# Patient Record
Sex: MALE | Race: WHITE | NOT HISPANIC OR LATINO | Employment: OTHER | URBAN - NONMETROPOLITAN AREA
[De-identification: names, ages, dates, MRNs, and addresses within clinical notes are randomized per-mention and may not be internally consistent; named-entity substitution may affect disease eponyms.]

---

## 2021-01-18 ENCOUNTER — HOSPITAL ENCOUNTER (INPATIENT)
Facility: HOSPITAL | Age: 66
LOS: 2 days | Discharge: HOME | End: 2021-01-20
Attending: EMERGENCY MEDICINE | Admitting: INTERNAL MEDICINE

## 2021-01-18 ENCOUNTER — APPOINTMENT (OUTPATIENT)
Dept: GENERAL RADIOLOGY | Facility: HOSPITAL | Age: 66
End: 2021-01-18

## 2021-01-18 DIAGNOSIS — I50.9 ACUTE CONGESTIVE HEART FAILURE, UNSPECIFIED HEART FAILURE TYPE (HCC): Primary | ICD-10-CM

## 2021-01-18 DIAGNOSIS — I48.91 NEW ONSET ATRIAL FIBRILLATION (HCC): ICD-10-CM

## 2021-01-18 DIAGNOSIS — R07.9 CHEST PAIN IN ADULT: ICD-10-CM

## 2021-01-18 DIAGNOSIS — I10 ESSENTIAL HYPERTENSION: ICD-10-CM

## 2021-01-18 PROBLEM — R73.9 HYPERGLYCEMIA: Status: ACTIVE | Noted: 2021-01-18

## 2021-01-18 PROBLEM — U07.1 COVID-19 VIRUS DETECTED: Status: ACTIVE | Noted: 2021-01-18

## 2021-01-18 PROBLEM — E66.9 OBESITY (BMI 30-39.9): Chronic | Status: ACTIVE | Noted: 2021-01-18

## 2021-01-18 PROBLEM — Z87.891 FORMER SMOKER: Chronic | Status: ACTIVE | Noted: 2021-01-18

## 2021-01-18 PROBLEM — I51.7 CARDIOMEGALY: Status: ACTIVE | Noted: 2021-01-18

## 2021-01-18 LAB
ALBUMIN SERPL-MCNC: 4.19 G/DL (ref 3.5–5.2)
ALBUMIN/GLOB SERPL: 1.6 G/DL
ALP SERPL-CCNC: 87 U/L (ref 39–117)
ALT SERPL W P-5'-P-CCNC: 24 U/L (ref 1–41)
ANION GAP SERPL CALCULATED.3IONS-SCNC: 11.3 MMOL/L (ref 5–15)
APTT PPP: 32.8 SECONDS (ref 25.6–35.3)
AST SERPL-CCNC: 20 U/L (ref 1–40)
B PERT DNA SPEC QL NAA+PROBE: NOT DETECTED
BASOPHILS # BLD AUTO: 0.04 10*3/MM3 (ref 0–0.2)
BASOPHILS NFR BLD AUTO: 0.4 % (ref 0–1.5)
BILIRUB SERPL-MCNC: 1.4 MG/DL (ref 0–1.2)
BUN SERPL-MCNC: 17 MG/DL (ref 8–23)
BUN/CREAT SERPL: 17.5 (ref 7–25)
C PNEUM DNA NPH QL NAA+NON-PROBE: NOT DETECTED
CALCIUM SPEC-SCNC: 9.4 MG/DL (ref 8.6–10.5)
CHLORIDE SERPL-SCNC: 102 MMOL/L (ref 98–107)
CO2 SERPL-SCNC: 25.7 MMOL/L (ref 22–29)
CREAT SERPL-MCNC: 0.97 MG/DL (ref 0.76–1.27)
CRP SERPL-MCNC: <0.3 MG/DL (ref 0–0.5)
D DIMER PPP FEU-MCNC: 0.37 MCGFEU/ML (ref 0–0.5)
DEPRECATED RDW RBC AUTO: 43 FL (ref 37–54)
EOSINOPHIL # BLD AUTO: 0.04 10*3/MM3 (ref 0–0.4)
EOSINOPHIL NFR BLD AUTO: 0.4 % (ref 0.3–6.2)
ERYTHROCYTE [DISTWIDTH] IN BLOOD BY AUTOMATED COUNT: 12.7 % (ref 12.3–15.4)
FERRITIN SERPL-MCNC: 277.6 NG/ML (ref 30–400)
FLUAV RNA RESP QL NAA+PROBE: NOT DETECTED
FLUAV SUBTYP SPEC NAA+PROBE: NOT DETECTED
FLUBV RNA ISLT QL NAA+PROBE: NOT DETECTED
FLUBV RNA RESP QL NAA+PROBE: NOT DETECTED
GFR SERPL CREATININE-BSD FRML MDRD: 78 ML/MIN/1.73
GLOBULIN UR ELPH-MCNC: 2.6 GM/DL
GLUCOSE SERPL-MCNC: 110 MG/DL (ref 65–99)
HADV DNA SPEC NAA+PROBE: NOT DETECTED
HBA1C MFR BLD: 6 % (ref 4.8–5.6)
HCOV 229E RNA SPEC QL NAA+PROBE: NOT DETECTED
HCOV HKU1 RNA SPEC QL NAA+PROBE: NOT DETECTED
HCOV NL63 RNA SPEC QL NAA+PROBE: NOT DETECTED
HCOV OC43 RNA SPEC QL NAA+PROBE: NOT DETECTED
HCT VFR BLD AUTO: 46.9 % (ref 37.5–51)
HGB BLD-MCNC: 15.4 G/DL (ref 13–17.7)
HMPV RNA NPH QL NAA+NON-PROBE: NOT DETECTED
HOLD SPECIMEN: NORMAL
HOLD SPECIMEN: NORMAL
HPIV1 RNA SPEC QL NAA+PROBE: NOT DETECTED
HPIV2 RNA SPEC QL NAA+PROBE: NOT DETECTED
HPIV3 RNA NPH QL NAA+PROBE: NOT DETECTED
HPIV4 P GENE NPH QL NAA+PROBE: NOT DETECTED
IMM GRANULOCYTES # BLD AUTO: 0.04 10*3/MM3 (ref 0–0.05)
IMM GRANULOCYTES NFR BLD AUTO: 0.4 % (ref 0–0.5)
L PNEUMO1 AG UR QL IA: NEGATIVE
LDH SERPL-CCNC: 251 U/L (ref 135–225)
LYMPHOCYTES # BLD AUTO: 2.53 10*3/MM3 (ref 0.7–3.1)
LYMPHOCYTES NFR BLD AUTO: 23.6 % (ref 19.6–45.3)
M PNEUMO IGG SER IA-ACNC: NOT DETECTED
MCH RBC QN AUTO: 30.7 PG (ref 26.6–33)
MCHC RBC AUTO-ENTMCNC: 32.8 G/DL (ref 31.5–35.7)
MCV RBC AUTO: 93.4 FL (ref 79–97)
MONOCYTES # BLD AUTO: 0.69 10*3/MM3 (ref 0.1–0.9)
MONOCYTES NFR BLD AUTO: 6.4 % (ref 5–12)
NEUTROPHILS NFR BLD AUTO: 68.8 % (ref 42.7–76)
NEUTROPHILS NFR BLD AUTO: 7.39 10*3/MM3 (ref 1.7–7)
NRBC BLD AUTO-RTO: 0 /100 WBC (ref 0–0.2)
NT-PROBNP SERPL-MCNC: 7152 PG/ML (ref 0–900)
PLATELET # BLD AUTO: 201 10*3/MM3 (ref 140–450)
PMV BLD AUTO: 9.8 FL (ref 6–12)
POTASSIUM SERPL-SCNC: 4.2 MMOL/L (ref 3.5–5.2)
PROT SERPL-MCNC: 6.8 G/DL (ref 6–8.5)
RBC # BLD AUTO: 5.02 10*6/MM3 (ref 4.14–5.8)
RHINOVIRUS RNA SPEC NAA+PROBE: NOT DETECTED
RSV RNA NPH QL NAA+NON-PROBE: NOT DETECTED
SARS-COV-2 RNA RESP QL NAA+PROBE: DETECTED
SODIUM SERPL-SCNC: 139 MMOL/L (ref 136–145)
TROPONIN T SERPL-MCNC: <0.01 NG/ML (ref 0–0.03)
TROPONIN T SERPL-MCNC: <0.01 NG/ML (ref 0–0.03)
WBC # BLD AUTO: 10.73 10*3/MM3 (ref 3.4–10.8)
WHOLE BLOOD HOLD SPECIMEN: NORMAL
WHOLE BLOOD HOLD SPECIMEN: NORMAL

## 2021-01-18 PROCEDURE — 71045 X-RAY EXAM CHEST 1 VIEW: CPT

## 2021-01-18 PROCEDURE — 93005 ELECTROCARDIOGRAM TRACING: CPT | Performed by: EMERGENCY MEDICINE

## 2021-01-18 PROCEDURE — 84484 ASSAY OF TROPONIN QUANT: CPT | Performed by: NURSE PRACTITIONER

## 2021-01-18 PROCEDURE — 87636 SARSCOV2 & INF A&B AMP PRB: CPT | Performed by: EMERGENCY MEDICINE

## 2021-01-18 PROCEDURE — 99223 1ST HOSP IP/OBS HIGH 75: CPT | Performed by: HOSPITALIST

## 2021-01-18 PROCEDURE — 87899 AGENT NOS ASSAY W/OPTIC: CPT | Performed by: PHYSICIAN ASSISTANT

## 2021-01-18 PROCEDURE — 25010000002 CEFTRIAXONE PER 250 MG: Performed by: NURSE PRACTITIONER

## 2021-01-18 PROCEDURE — 80053 COMPREHEN METABOLIC PANEL: CPT | Performed by: NURSE PRACTITIONER

## 2021-01-18 PROCEDURE — 84145 PROCALCITONIN (PCT): CPT | Performed by: PHYSICIAN ASSISTANT

## 2021-01-18 PROCEDURE — 0099U HC BIOFIRE FILMARRAY RESP PANEL 1: CPT | Performed by: PHYSICIAN ASSISTANT

## 2021-01-18 PROCEDURE — 85730 THROMBOPLASTIN TIME PARTIAL: CPT | Performed by: NURSE PRACTITIONER

## 2021-01-18 PROCEDURE — 71045 X-RAY EXAM CHEST 1 VIEW: CPT | Performed by: RADIOLOGY

## 2021-01-18 PROCEDURE — 82728 ASSAY OF FERRITIN: CPT | Performed by: PHYSICIAN ASSISTANT

## 2021-01-18 PROCEDURE — 93010 ELECTROCARDIOGRAM REPORT: CPT | Performed by: INTERNAL MEDICINE

## 2021-01-18 PROCEDURE — 83036 HEMOGLOBIN GLYCOSYLATED A1C: CPT | Performed by: HOSPITALIST

## 2021-01-18 PROCEDURE — 87040 BLOOD CULTURE FOR BACTERIA: CPT | Performed by: PHYSICIAN ASSISTANT

## 2021-01-18 PROCEDURE — 25010000002 ENOXAPARIN PER 10 MG: Performed by: HOSPITALIST

## 2021-01-18 PROCEDURE — 83615 LACTATE (LD) (LDH) ENZYME: CPT | Performed by: PHYSICIAN ASSISTANT

## 2021-01-18 PROCEDURE — 25010000002 FUROSEMIDE PER 20 MG: Performed by: PHYSICIAN ASSISTANT

## 2021-01-18 PROCEDURE — 86140 C-REACTIVE PROTEIN: CPT | Performed by: PHYSICIAN ASSISTANT

## 2021-01-18 PROCEDURE — 85379 FIBRIN DEGRADATION QUANT: CPT | Performed by: PHYSICIAN ASSISTANT

## 2021-01-18 PROCEDURE — 99284 EMERGENCY DEPT VISIT MOD MDM: CPT

## 2021-01-18 PROCEDURE — 83880 ASSAY OF NATRIURETIC PEPTIDE: CPT | Performed by: NURSE PRACTITIONER

## 2021-01-18 PROCEDURE — 36415 COLL VENOUS BLD VENIPUNCTURE: CPT

## 2021-01-18 PROCEDURE — 85025 COMPLETE CBC W/AUTO DIFF WBC: CPT | Performed by: NURSE PRACTITIONER

## 2021-01-18 RX ORDER — SODIUM CHLORIDE 0.9 % (FLUSH) 0.9 %
10 SYRINGE (ML) INJECTION AS NEEDED
Status: DISCONTINUED | OUTPATIENT
Start: 2021-01-18 | End: 2021-01-20 | Stop reason: HOSPADM

## 2021-01-18 RX ORDER — ONDANSETRON 2 MG/ML
4 INJECTION INTRAMUSCULAR; INTRAVENOUS EVERY 6 HOURS PRN
Status: DISCONTINUED | OUTPATIENT
Start: 2021-01-18 | End: 2021-01-20 | Stop reason: HOSPADM

## 2021-01-18 RX ORDER — NITROGLYCERIN 0.4 MG/1
0.4 TABLET SUBLINGUAL
Status: DISCONTINUED | OUTPATIENT
Start: 2021-01-18 | End: 2021-01-20 | Stop reason: HOSPADM

## 2021-01-18 RX ORDER — ACETAMINOPHEN 325 MG/1
650 TABLET ORAL EVERY 6 HOURS PRN
Status: DISCONTINUED | OUTPATIENT
Start: 2021-01-18 | End: 2021-01-20 | Stop reason: HOSPADM

## 2021-01-18 RX ORDER — FUROSEMIDE 10 MG/ML
40 INJECTION INTRAMUSCULAR; INTRAVENOUS ONCE
Status: COMPLETED | OUTPATIENT
Start: 2021-01-18 | End: 2021-01-18

## 2021-01-18 RX ORDER — GUAIFENESIN 600 MG/1
600 TABLET, EXTENDED RELEASE ORAL 2 TIMES DAILY PRN
COMMUNITY
End: 2021-03-01

## 2021-01-18 RX ORDER — SODIUM CHLORIDE 0.9 % (FLUSH) 0.9 %
10 SYRINGE (ML) INJECTION EVERY 12 HOURS SCHEDULED
Status: DISCONTINUED | OUTPATIENT
Start: 2021-01-18 | End: 2021-01-20 | Stop reason: HOSPADM

## 2021-01-18 RX ORDER — PANTOPRAZOLE SODIUM 40 MG/1
40 TABLET, DELAYED RELEASE ORAL
Status: DISCONTINUED | OUTPATIENT
Start: 2021-01-19 | End: 2021-01-20 | Stop reason: HOSPADM

## 2021-01-18 RX ORDER — ASPIRIN 81 MG/1
81 TABLET, CHEWABLE ORAL DAILY
Status: DISCONTINUED | OUTPATIENT
Start: 2021-01-19 | End: 2021-01-20 | Stop reason: HOSPADM

## 2021-01-18 RX ORDER — ASPIRIN 81 MG/1
324 TABLET, CHEWABLE ORAL ONCE
Status: COMPLETED | OUTPATIENT
Start: 2021-01-18 | End: 2021-01-18

## 2021-01-18 RX ORDER — DEXAMETHASONE 4 MG/1
6 TABLET ORAL DAILY
Status: DISCONTINUED | OUTPATIENT
Start: 2021-01-18 | End: 2021-01-19

## 2021-01-18 RX ADMIN — SODIUM CHLORIDE, PRESERVATIVE FREE 10 ML: 5 INJECTION INTRAVENOUS at 21:33

## 2021-01-18 RX ADMIN — CEFTRIAXONE 1 G: 1 INJECTION, POWDER, FOR SOLUTION INTRAMUSCULAR; INTRAVENOUS at 18:25

## 2021-01-18 RX ADMIN — METOPROLOL TARTRATE 25 MG: 25 TABLET, FILM COATED ORAL at 23:00

## 2021-01-18 RX ADMIN — ENOXAPARIN SODIUM 40 MG: 40 INJECTION SUBCUTANEOUS at 20:04

## 2021-01-18 RX ADMIN — FUROSEMIDE 40 MG: 10 INJECTION, SOLUTION INTRAMUSCULAR; INTRAVENOUS at 23:00

## 2021-01-18 RX ADMIN — ASPIRIN 324 MG: 81 TABLET, CHEWABLE ORAL at 14:55

## 2021-01-19 ENCOUNTER — APPOINTMENT (OUTPATIENT)
Dept: CARDIOLOGY | Facility: HOSPITAL | Age: 66
End: 2021-01-19

## 2021-01-19 ENCOUNTER — APPOINTMENT (OUTPATIENT)
Dept: CT IMAGING | Facility: HOSPITAL | Age: 66
End: 2021-01-19

## 2021-01-19 LAB
ALBUMIN SERPL-MCNC: 4.03 G/DL (ref 3.5–5.2)
ALBUMIN/GLOB SERPL: 1.5 G/DL
ALP SERPL-CCNC: 84 U/L (ref 39–117)
ALT SERPL W P-5'-P-CCNC: 21 U/L (ref 1–41)
ANION GAP SERPL CALCULATED.3IONS-SCNC: 11.7 MMOL/L (ref 5–15)
AST SERPL-CCNC: 17 U/L (ref 1–40)
BASOPHILS # BLD AUTO: 0.07 10*3/MM3 (ref 0–0.2)
BASOPHILS NFR BLD AUTO: 0.7 % (ref 0–1.5)
BH CV ECHO MEAS - ACS: 2.8 CM
BH CV ECHO MEAS - AO ROOT AREA (BSA CORRECTED): 1.9
BH CV ECHO MEAS - AO ROOT AREA: 17 CM^2
BH CV ECHO MEAS - AO ROOT DIAM: 4.7 CM
BH CV ECHO MEAS - BSA(HAYCOCK): 2.6 M^2
BH CV ECHO MEAS - BSA: 2.5 M^2
BH CV ECHO MEAS - BZI_BMI: 34.7 KILOGRAMS/M^2
BH CV ECHO MEAS - BZI_METRIC_HEIGHT: 188 CM
BH CV ECHO MEAS - BZI_METRIC_WEIGHT: 122.5 KG
BH CV ECHO MEAS - EDV(MOD-SP4): 124 ML
BH CV ECHO MEAS - EF(MOD-SP4): 53.6 %
BH CV ECHO MEAS - ESV(MOD-SP4): 57.5 ML
BH CV ECHO MEAS - LA DIMENSION: 3.9 CM
BH CV ECHO MEAS - LA/AO: 0.84
BH CV ECHO MEAS - LV DIASTOLIC VOL/BSA (35-75): 50.2 ML/M^2
BH CV ECHO MEAS - LV SYSTOLIC VOL/BSA (12-30): 23.3 ML/M^2
BH CV ECHO MEAS - LVLD AP4: 8.7 CM
BH CV ECHO MEAS - LVLS AP4: 7.8 CM
BH CV ECHO MEAS - RAP SYSTOLE: 10 MMHG
BH CV ECHO MEAS - RVSP: 41.8 MMHG
BH CV ECHO MEAS - SI(MOD-SP4): 26.9 ML/M^2
BH CV ECHO MEAS - SV(MOD-SP4): 66.5 ML
BH CV ECHO MEAS - TR MAX VEL: 282 CM/SEC
BILIRUB SERPL-MCNC: 1.1 MG/DL (ref 0–1.2)
BUN SERPL-MCNC: 18 MG/DL (ref 8–23)
BUN/CREAT SERPL: 18.4 (ref 7–25)
CALCIUM SPEC-SCNC: 9.4 MG/DL (ref 8.6–10.5)
CHLORIDE SERPL-SCNC: 102 MMOL/L (ref 98–107)
CHOLEST SERPL-MCNC: 181 MG/DL (ref 0–200)
CK SERPL-CCNC: 91 U/L (ref 20–200)
CO2 SERPL-SCNC: 26.3 MMOL/L (ref 22–29)
CREAT SERPL-MCNC: 0.98 MG/DL (ref 0.76–1.27)
CRP SERPL-MCNC: <0.3 MG/DL (ref 0–0.5)
D DIMER PPP FEU-MCNC: 0.52 MCGFEU/ML (ref 0–0.5)
DEPRECATED RDW RBC AUTO: 43.8 FL (ref 37–54)
EOSINOPHIL # BLD AUTO: 0.08 10*3/MM3 (ref 0–0.4)
EOSINOPHIL NFR BLD AUTO: 0.8 % (ref 0.3–6.2)
ERYTHROCYTE [DISTWIDTH] IN BLOOD BY AUTOMATED COUNT: 12.7 % (ref 12.3–15.4)
FERRITIN SERPL-MCNC: 284.9 NG/ML (ref 30–400)
FIBRINOGEN PPP-MCNC: 422 MG/DL (ref 173–524)
GFR SERPL CREATININE-BSD FRML MDRD: 77 ML/MIN/1.73
GLOBULIN UR ELPH-MCNC: 2.8 GM/DL
GLUCOSE SERPL-MCNC: 111 MG/DL (ref 65–99)
HCT VFR BLD AUTO: 48.9 % (ref 37.5–51)
HDLC SERPL-MCNC: 56 MG/DL (ref 40–60)
HGB BLD-MCNC: 15.8 G/DL (ref 13–17.7)
IMM GRANULOCYTES # BLD AUTO: 0.03 10*3/MM3 (ref 0–0.05)
IMM GRANULOCYTES NFR BLD AUTO: 0.3 % (ref 0–0.5)
LDH SERPL-CCNC: 234 U/L (ref 135–225)
LDLC SERPL CALC-MCNC: 103 MG/DL (ref 0–100)
LDLC/HDLC SERPL: 1.79 {RATIO}
LYMPHOCYTES # BLD AUTO: 3.08 10*3/MM3 (ref 0.7–3.1)
LYMPHOCYTES NFR BLD AUTO: 32.6 % (ref 19.6–45.3)
MAGNESIUM SERPL-MCNC: 1.9 MG/DL (ref 1.6–2.4)
MAXIMAL PREDICTED HEART RATE: 156 BPM
MCH RBC QN AUTO: 30.6 PG (ref 26.6–33)
MCHC RBC AUTO-ENTMCNC: 32.3 G/DL (ref 31.5–35.7)
MCV RBC AUTO: 94.6 FL (ref 79–97)
MONOCYTES # BLD AUTO: 0.67 10*3/MM3 (ref 0.1–0.9)
MONOCYTES NFR BLD AUTO: 7.1 % (ref 5–12)
NEUTROPHILS NFR BLD AUTO: 5.51 10*3/MM3 (ref 1.7–7)
NEUTROPHILS NFR BLD AUTO: 58.5 % (ref 42.7–76)
NRBC BLD AUTO-RTO: 0 /100 WBC (ref 0–0.2)
NT-PROBNP SERPL-MCNC: 6866 PG/ML (ref 0–900)
PHOSPHATE SERPL-MCNC: 2.6 MG/DL (ref 2.5–4.5)
PLATELET # BLD AUTO: 211 10*3/MM3 (ref 140–450)
PMV BLD AUTO: 10.3 FL (ref 6–12)
POTASSIUM SERPL-SCNC: 3.7 MMOL/L (ref 3.5–5.2)
PROCALCITONIN SERPL-MCNC: 0.05 NG/ML (ref 0–0.25)
PROT SERPL-MCNC: 6.8 G/DL (ref 6–8.5)
RBC # BLD AUTO: 5.17 10*6/MM3 (ref 4.14–5.8)
S PNEUM AG SPEC QL LA: NEGATIVE
SODIUM SERPL-SCNC: 140 MMOL/L (ref 136–145)
STRESS TARGET HR: 133 BPM
TRIGL SERPL-MCNC: 124 MG/DL (ref 0–150)
TROPONIN T SERPL-MCNC: <0.01 NG/ML (ref 0–0.03)
TSH SERPL DL<=0.05 MIU/L-ACNC: 3.73 UIU/ML (ref 0.27–4.2)
VLDLC SERPL-MCNC: 22 MG/DL (ref 5–40)
WBC # BLD AUTO: 9.44 10*3/MM3 (ref 3.4–10.8)

## 2021-01-19 PROCEDURE — 82728 ASSAY OF FERRITIN: CPT | Performed by: PHYSICIAN ASSISTANT

## 2021-01-19 PROCEDURE — 71250 CT THORAX DX C-: CPT

## 2021-01-19 PROCEDURE — 25010000002 MAGNESIUM SULFATE IN D5W 1G/100ML (PREMIX) 1-5 GM/100ML-% SOLUTION: Performed by: HOSPITALIST

## 2021-01-19 PROCEDURE — 84100 ASSAY OF PHOSPHORUS: CPT | Performed by: PHYSICIAN ASSISTANT

## 2021-01-19 PROCEDURE — 85379 FIBRIN DEGRADATION QUANT: CPT | Performed by: PHYSICIAN ASSISTANT

## 2021-01-19 PROCEDURE — 80053 COMPREHEN METABOLIC PANEL: CPT | Performed by: PHYSICIAN ASSISTANT

## 2021-01-19 PROCEDURE — 84484 ASSAY OF TROPONIN QUANT: CPT | Performed by: HOSPITALIST

## 2021-01-19 PROCEDURE — 94799 UNLISTED PULMONARY SVC/PX: CPT

## 2021-01-19 PROCEDURE — 86140 C-REACTIVE PROTEIN: CPT | Performed by: PHYSICIAN ASSISTANT

## 2021-01-19 PROCEDURE — 99222 1ST HOSP IP/OBS MODERATE 55: CPT | Performed by: SPECIALIST

## 2021-01-19 PROCEDURE — 25010000002 ENOXAPARIN PER 10 MG: Performed by: HOSPITALIST

## 2021-01-19 PROCEDURE — 83735 ASSAY OF MAGNESIUM: CPT | Performed by: PHYSICIAN ASSISTANT

## 2021-01-19 PROCEDURE — 93306 TTE W/DOPPLER COMPLETE: CPT

## 2021-01-19 PROCEDURE — 83880 ASSAY OF NATRIURETIC PEPTIDE: CPT | Performed by: PHYSICIAN ASSISTANT

## 2021-01-19 PROCEDURE — 85384 FIBRINOGEN ACTIVITY: CPT | Performed by: PHYSICIAN ASSISTANT

## 2021-01-19 PROCEDURE — 80061 LIPID PANEL: CPT | Performed by: HOSPITALIST

## 2021-01-19 PROCEDURE — 63710000001 DEXAMETHASONE PER 0.25 MG: Performed by: PHYSICIAN ASSISTANT

## 2021-01-19 PROCEDURE — 82550 ASSAY OF CK (CPK): CPT | Performed by: PHYSICIAN ASSISTANT

## 2021-01-19 PROCEDURE — 84443 ASSAY THYROID STIM HORMONE: CPT | Performed by: HOSPITALIST

## 2021-01-19 PROCEDURE — 99232 SBSQ HOSP IP/OBS MODERATE 35: CPT | Performed by: HOSPITALIST

## 2021-01-19 PROCEDURE — 25010000002 FUROSEMIDE PER 20 MG: Performed by: HOSPITALIST

## 2021-01-19 PROCEDURE — 83615 LACTATE (LD) (LDH) ENZYME: CPT | Performed by: PHYSICIAN ASSISTANT

## 2021-01-19 PROCEDURE — 93306 TTE W/DOPPLER COMPLETE: CPT | Performed by: SPECIALIST

## 2021-01-19 PROCEDURE — 85025 COMPLETE CBC W/AUTO DIFF WBC: CPT | Performed by: PHYSICIAN ASSISTANT

## 2021-01-19 RX ORDER — FUROSEMIDE 10 MG/ML
40 INJECTION INTRAMUSCULAR; INTRAVENOUS ONCE
Status: COMPLETED | OUTPATIENT
Start: 2021-01-19 | End: 2021-01-19

## 2021-01-19 RX ORDER — MAGNESIUM SULFATE 1 G/100ML
1 INJECTION INTRAVENOUS ONCE
Status: COMPLETED | OUTPATIENT
Start: 2021-01-19 | End: 2021-01-19

## 2021-01-19 RX ORDER — ATORVASTATIN CALCIUM 40 MG/1
40 TABLET, FILM COATED ORAL NIGHTLY
Status: DISCONTINUED | OUTPATIENT
Start: 2021-01-19 | End: 2021-01-20 | Stop reason: HOSPADM

## 2021-01-19 RX ORDER — METOPROLOL TARTRATE 100 MG/1
100 TABLET ORAL EVERY 12 HOURS SCHEDULED
Status: DISCONTINUED | OUTPATIENT
Start: 2021-01-19 | End: 2021-01-20 | Stop reason: HOSPADM

## 2021-01-19 RX ORDER — POTASSIUM CHLORIDE 20 MEQ/1
40 TABLET, EXTENDED RELEASE ORAL ONCE
Status: COMPLETED | OUTPATIENT
Start: 2021-01-19 | End: 2021-01-19

## 2021-01-19 RX ADMIN — ATORVASTATIN CALCIUM 40 MG: 40 TABLET, FILM COATED ORAL at 20:40

## 2021-01-19 RX ADMIN — METOPROLOL TARTRATE 25 MG: 25 TABLET, FILM COATED ORAL at 09:42

## 2021-01-19 RX ADMIN — ENOXAPARIN SODIUM 40 MG: 40 INJECTION SUBCUTANEOUS at 20:40

## 2021-01-19 RX ADMIN — FUROSEMIDE 40 MG: 10 INJECTION, SOLUTION INTRAMUSCULAR; INTRAVENOUS at 13:39

## 2021-01-19 RX ADMIN — PANTOPRAZOLE SODIUM 40 MG: 40 TABLET, DELAYED RELEASE ORAL at 05:56

## 2021-01-19 RX ADMIN — SODIUM CHLORIDE, PRESERVATIVE FREE 10 ML: 5 INJECTION INTRAVENOUS at 09:42

## 2021-01-19 RX ADMIN — METOPROLOL TARTRATE 100 MG: 100 TABLET, FILM COATED ORAL at 20:38

## 2021-01-19 RX ADMIN — POTASSIUM CHLORIDE 40 MEQ: 20 TABLET, EXTENDED RELEASE ORAL at 13:38

## 2021-01-19 RX ADMIN — ASPIRIN 81 MG: 81 TABLET, CHEWABLE ORAL at 09:42

## 2021-01-19 RX ADMIN — DEXAMETHASONE 6 MG: 4 TABLET ORAL at 00:13

## 2021-01-19 RX ADMIN — MAGNESIUM SULFATE IN DEXTROSE 1 G: 10 INJECTION, SOLUTION INTRAVENOUS at 13:39

## 2021-01-20 ENCOUNTER — READMISSION MANAGEMENT (OUTPATIENT)
Dept: CALL CENTER | Facility: HOSPITAL | Age: 66
End: 2021-01-20

## 2021-01-20 VITALS
WEIGHT: 270 LBS | HEART RATE: 76 BPM | BODY MASS INDEX: 34.65 KG/M2 | DIASTOLIC BLOOD PRESSURE: 84 MMHG | RESPIRATION RATE: 18 BRPM | HEIGHT: 74 IN | SYSTOLIC BLOOD PRESSURE: 123 MMHG | OXYGEN SATURATION: 97 % | TEMPERATURE: 98.8 F

## 2021-01-20 DIAGNOSIS — I48.91 NEW ONSET ATRIAL FIBRILLATION (HCC): ICD-10-CM

## 2021-01-20 DIAGNOSIS — I71.20 THORACIC AORTIC ANEURYSM WITHOUT RUPTURE (HCC): Primary | ICD-10-CM

## 2021-01-20 DIAGNOSIS — R07.9 CHEST PAIN IN ADULT: ICD-10-CM

## 2021-01-20 LAB
ALBUMIN SERPL-MCNC: 4.13 G/DL (ref 3.5–5.2)
ALBUMIN/GLOB SERPL: 1.4 G/DL
ALP SERPL-CCNC: 86 U/L (ref 39–117)
ALT SERPL W P-5'-P-CCNC: 20 U/L (ref 1–41)
ANION GAP SERPL CALCULATED.3IONS-SCNC: 12.2 MMOL/L (ref 5–15)
ANION GAP SERPL CALCULATED.3IONS-SCNC: 9.7 MMOL/L (ref 5–15)
AST SERPL-CCNC: 18 U/L (ref 1–40)
BASOPHILS # BLD AUTO: 0.03 10*3/MM3 (ref 0–0.2)
BASOPHILS NFR BLD AUTO: 0.2 % (ref 0–1.5)
BILIRUB SERPL-MCNC: 0.8 MG/DL (ref 0–1.2)
BUN SERPL-MCNC: 23 MG/DL (ref 8–23)
BUN SERPL-MCNC: 27 MG/DL (ref 8–23)
BUN/CREAT SERPL: 18.9 (ref 7–25)
BUN/CREAT SERPL: 19.7 (ref 7–25)
CALCIUM SPEC-SCNC: 9.4 MG/DL (ref 8.6–10.5)
CALCIUM SPEC-SCNC: 9.6 MG/DL (ref 8.6–10.5)
CHLORIDE SERPL-SCNC: 101 MMOL/L (ref 98–107)
CHLORIDE SERPL-SCNC: 103 MMOL/L (ref 98–107)
CO2 SERPL-SCNC: 24.8 MMOL/L (ref 22–29)
CO2 SERPL-SCNC: 28.3 MMOL/L (ref 22–29)
CREAT SERPL-MCNC: 1.22 MG/DL (ref 0.76–1.27)
CREAT SERPL-MCNC: 1.37 MG/DL (ref 0.76–1.27)
DEPRECATED RDW RBC AUTO: 42.5 FL (ref 37–54)
EOSINOPHIL # BLD AUTO: 0.01 10*3/MM3 (ref 0–0.4)
EOSINOPHIL NFR BLD AUTO: 0.1 % (ref 0.3–6.2)
ERYTHROCYTE [DISTWIDTH] IN BLOOD BY AUTOMATED COUNT: 12.7 % (ref 12.3–15.4)
GFR SERPL CREATININE-BSD FRML MDRD: 52 ML/MIN/1.73
GFR SERPL CREATININE-BSD FRML MDRD: 60 ML/MIN/1.73
GLOBULIN UR ELPH-MCNC: 2.9 GM/DL
GLUCOSE SERPL-MCNC: 125 MG/DL (ref 65–99)
GLUCOSE SERPL-MCNC: 128 MG/DL (ref 65–99)
HCT VFR BLD AUTO: 46.1 % (ref 37.5–51)
HGB BLD-MCNC: 15.3 G/DL (ref 13–17.7)
IMM GRANULOCYTES # BLD AUTO: 0.07 10*3/MM3 (ref 0–0.05)
IMM GRANULOCYTES NFR BLD AUTO: 0.6 % (ref 0–0.5)
LYMPHOCYTES # BLD AUTO: 2.29 10*3/MM3 (ref 0.7–3.1)
LYMPHOCYTES NFR BLD AUTO: 18.7 % (ref 19.6–45.3)
MAGNESIUM SERPL-MCNC: 2.1 MG/DL (ref 1.6–2.4)
MCH RBC QN AUTO: 30.8 PG (ref 26.6–33)
MCHC RBC AUTO-ENTMCNC: 33.2 G/DL (ref 31.5–35.7)
MCV RBC AUTO: 92.8 FL (ref 79–97)
MONOCYTES # BLD AUTO: 1 10*3/MM3 (ref 0.1–0.9)
MONOCYTES NFR BLD AUTO: 8.2 % (ref 5–12)
NEUTROPHILS NFR BLD AUTO: 72.2 % (ref 42.7–76)
NEUTROPHILS NFR BLD AUTO: 8.86 10*3/MM3 (ref 1.7–7)
NRBC BLD AUTO-RTO: 0 /100 WBC (ref 0–0.2)
NT-PROBNP SERPL-MCNC: 7202 PG/ML (ref 0–900)
PLATELET # BLD AUTO: 213 10*3/MM3 (ref 140–450)
PMV BLD AUTO: 10.5 FL (ref 6–12)
POTASSIUM SERPL-SCNC: 4.3 MMOL/L (ref 3.5–5.2)
POTASSIUM SERPL-SCNC: 4.5 MMOL/L (ref 3.5–5.2)
PROT SERPL-MCNC: 7 G/DL (ref 6–8.5)
QT INTERVAL: 334 MS
QTC INTERVAL: 464 MS
RBC # BLD AUTO: 4.97 10*6/MM3 (ref 4.14–5.8)
SODIUM SERPL-SCNC: 139 MMOL/L (ref 136–145)
SODIUM SERPL-SCNC: 140 MMOL/L (ref 136–145)
WBC # BLD AUTO: 12.26 10*3/MM3 (ref 3.4–10.8)

## 2021-01-20 PROCEDURE — 80053 COMPREHEN METABOLIC PANEL: CPT | Performed by: PHYSICIAN ASSISTANT

## 2021-01-20 PROCEDURE — 85025 COMPLETE CBC W/AUTO DIFF WBC: CPT | Performed by: PHYSICIAN ASSISTANT

## 2021-01-20 PROCEDURE — 99232 SBSQ HOSP IP/OBS MODERATE 35: CPT | Performed by: NURSE PRACTITIONER

## 2021-01-20 PROCEDURE — 80048 BASIC METABOLIC PNL TOTAL CA: CPT | Performed by: HOSPITALIST

## 2021-01-20 PROCEDURE — 99239 HOSP IP/OBS DSCHRG MGMT >30: CPT | Performed by: HOSPITALIST

## 2021-01-20 PROCEDURE — 83880 ASSAY OF NATRIURETIC PEPTIDE: CPT | Performed by: HOSPITALIST

## 2021-01-20 PROCEDURE — 83735 ASSAY OF MAGNESIUM: CPT | Performed by: HOSPITALIST

## 2021-01-20 RX ORDER — ASPIRIN 81 MG/1
81 TABLET, CHEWABLE ORAL DAILY
Qty: 30 TABLET | Refills: 0 | Status: ON HOLD | OUTPATIENT
Start: 2021-01-20 | End: 2021-01-25

## 2021-01-20 RX ORDER — CARVEDILOL 25 MG/1
25 TABLET ORAL 2 TIMES DAILY WITH MEALS
Qty: 60 TABLET | Refills: 0 | Status: SHIPPED | OUTPATIENT
Start: 2021-01-20 | End: 2021-02-17 | Stop reason: SDUPTHER

## 2021-01-20 RX ORDER — METOPROLOL TARTRATE 100 MG/1
100 TABLET ORAL EVERY 12 HOURS SCHEDULED
Qty: 60 TABLET | Refills: 0 | Status: SHIPPED | OUTPATIENT
Start: 2021-01-20 | End: 2021-01-20 | Stop reason: HOSPADM

## 2021-01-20 RX ORDER — ATORVASTATIN CALCIUM 40 MG/1
40 TABLET, FILM COATED ORAL NIGHTLY
Qty: 30 TABLET | Refills: 0 | Status: SHIPPED | OUTPATIENT
Start: 2021-01-20 | End: 2021-02-17 | Stop reason: SDUPTHER

## 2021-01-20 RX ADMIN — ASPIRIN 81 MG: 81 TABLET, CHEWABLE ORAL at 12:24

## 2021-01-20 RX ADMIN — METOPROLOL TARTRATE 100 MG: 100 TABLET, FILM COATED ORAL at 12:24

## 2021-01-20 RX ADMIN — SODIUM CHLORIDE, PRESERVATIVE FREE 10 ML: 5 INJECTION INTRAVENOUS at 08:46

## 2021-01-20 NOTE — PROGRESS NOTES
MRJ spoke with Mabel Flores today regarding this pt. Pt would like [facility] to do cath and aortogram for TAA. Dr. Harjinder Hollis is also aware of pt. Per J set up for cath on Monday.     Pt tested positive for COVID 12/27/20.

## 2021-01-21 ENCOUNTER — READMISSION MANAGEMENT (OUTPATIENT)
Dept: CALL CENTER | Facility: HOSPITAL | Age: 66
End: 2021-01-21

## 2021-01-21 PROBLEM — I71.20 THORACIC AORTIC ANEURYSM WITHOUT RUPTURE: Status: ACTIVE | Noted: 2021-01-21

## 2021-01-21 NOTE — OUTREACH NOTE
COVID-19 Week 1 Survey      Responses   [facility] patient discharged from?  [facility]   Does the patient have one of the following disease processes/diagnoses(primary or secondary)?  COVID-19   COVID-19 underlying condition?  CHF   Call Number  Call 1   Week 1 Call successful?  Yes   Call start time  0916   Call end time  0921   Discharge diagnosis  CHF (congestive heart failure),   Covid 19   Meds reviewed with patient/caregiver?  Yes   Is the patient having any side effects they believe may be caused by any medication additions or changes?  No   Does the patient have all medications ordered at discharge?  Yes   Is the patient taking all medications as directed (includes completed medication regime)?  Yes   Comments regarding appointments  Pt. states he has a f/u appt. with PCP at [facility] in a couple of weeks.   Does the patient have a primary care provider?   Yes   Does the patient have an appointment with their PCP or specialist within 7 days of discharge?  No   What is preventing the patient from scheduling follow up appointments within 7 days of discharge?  Earlier appointment not available [Pt. states he was given appt. by PCP office.]   Nursing Interventions  -- [Told pt. per D/C instructions f/u within 1 week recommended.]   Has the patient kept scheduled appointments due by today?  N/A   Has home health visited the patient within 72 hours of discharge?  N/A   Did the patient receive a copy of their discharge instructions?  Yes   Did the patient receive a copy of COVID-19 specific instructions?  Yes   Nursing interventions  Reviewed instructions with patient   What is the patient's perception of their health status since discharge?  Improving   Does the patient have any of the following symptoms?  None   Nursing Interventions  Nurse provided patient education   Pulse Ox monitoring  None   Is the patient/caregiver able to teach back steps to recovery at home?  Rest and rebuild strength,  gradually increase activity, Eat a well-balance diet, Practice good oral hygiene   If the patient is a current smoker, are they able to teach back resources for cessation?  Not a smoker   Is the patient/caregiver able to teach back the hierarchy of who to call/visit for symptoms/problems? PCP, Specialist, Home health nurse, Urgent Care, ED, 911  Yes   Does the patient have scales?  Yes   Is the patient weighing daily?  Yes   Daily weight interventions  Education provided on importance of daily weight   Is the patient able to teach back Heart Failure diet management?  Yes   Is the patient able to teach back Heart Failure Zones?  Yes   Is the patient able to teach back signs and symptoms of worsening condition? (i.e. weight gain, shortness of air, etc.)  Yes   COVID-19 call completed?  Yes          Mitali Morales RN

## 2021-01-21 NOTE — OUTREACH NOTE
Prep Survey      Responses   [facility] discharged from?  [facility]   Is LACE score < 7 ?  No   Emergency Room discharge w/ pulse ox?  No   Eligibility  Readm Mgmt   Discharge diagnosis  CHF (congestive heart failure),   Covid 19   Does the patient have one of the following disease processes/diagnoses(primary or secondary)?  COVID-19   Does the patient have Home health ordered?  No   Is there a DME ordered?  No   Prep survey completed?  Yes          Altagracia Nova RN

## 2021-01-22 ENCOUNTER — READMISSION MANAGEMENT (OUTPATIENT)
Dept: CALL CENTER | Facility: HOSPITAL | Age: 66
End: 2021-01-22

## 2021-01-22 NOTE — OUTREACH NOTE
COVID-19 Week 1 Survey      Responses   [facility] patient discharged from?  [facility]   Does the patient have one of the following disease processes/diagnoses(primary or secondary)?  COVID-19   COVID-19 underlying condition?  CHF   Call Number  Call 2   Week 1 Call successful?  Yes   Call start time  0854   Call end time  0857   Discharge diagnosis  CHF (congestive heart failure),   Covid 19   Meds reviewed with patient/caregiver?  Yes   Is the patient having any side effects they believe may be caused by any medication additions or changes?  No   Does the patient have all medications ordered at discharge?  Yes   Is the patient taking all medications as directed (includes completed medication regime)?  Yes   Does the patient have a primary care provider?   Yes   Comments regarding PCP  2/2/21 PCP FU   Does the patient have an appointment with their PCP or specialist within 7 days of discharge?  No   What is preventing the patient from scheduling follow up appointments within 7 days of discharge?  Earlier appointment not available   Nursing Interventions  Verified appointment date/time/provider   Psychosocial issues?  No   What is the patient's perception of their health status since discharge?  Improving   Does the patient have any of the following symptoms?  None   Pulse Ox monitoring  None   Is the patient/caregiver able to teach back steps to recovery at home?  Set small, achievable goals for return to baseline health, Rest and rebuild strength, gradually increase activity, Eat a well-balance diet   If the patient is a current smoker, are they able to teach back resources for cessation?  Not a smoker   Is the patient/caregiver able to teach back the hierarchy of who to call/visit for symptoms/problems? PCP, Specialist, Home health nurse, Urgent Care, ED, 911  Yes   Does the patient have scales?  Yes   Is the patient weighing daily?  Yes   Daily weight interventions  Education provided on importance of daily  weight   Is the patient able to teach back Heart Failure diet management?  Yes   Is the patient able to teach back Heart Failure Zones?  Yes   Is the patient able to teach back signs and symptoms of worsening condition? (i.e. weight gain, shortness of air, etc.)  Yes   COVID-19 call completed?  Yes          Vibha Matias RN

## 2021-01-23 ENCOUNTER — READMISSION MANAGEMENT (OUTPATIENT)
Dept: CALL CENTER | Facility: HOSPITAL | Age: 66
End: 2021-01-23

## 2021-01-23 LAB
BACTERIA SPEC AEROBE CULT: NORMAL
BACTERIA SPEC AEROBE CULT: NORMAL

## 2021-01-23 NOTE — OUTREACH NOTE
COVID-19 Week 1 Survey      Responses   [facility] [place] patient discharged from?  [facility]   Does the patient have one of the following disease processes/diagnoses(primary or secondary)?  COVID-19   COVID-19 underlying condition?  CHF   Call Number  Call 3   Week 1 Call successful?  Yes   Call start time  1138   Call end time  1143   Discharge diagnosis  CHF (congestive heart failure),   Covid 19   Meds reviewed with patient/caregiver?  Yes   Is the patient having any side effects they believe may be caused by any medication additions or changes?  No   Does the patient have all medications ordered at discharge?  Yes   Is the patient taking all medications as directed (includes completed medication regime)?  Yes   Does the patient have a primary care provider?   Yes   Comments regarding PCP  2/2/21 PCP FU   Does the patient have an appointment with their PCP or specialist within 7 days of discharge?  Greater than 7 days   What is preventing the patient from scheduling follow up appointments within 7 days of discharge?  Earlier appointment not available   Has the patient kept scheduled appointments due by today?  N/A   Comments  States has an appt on Monday with Dr. Waldron and Tues with Dr. Hollis   Has home health visited the patient within 72 hours of discharge?  N/A   Psychosocial issues?  No   Did the patient receive a copy of their discharge instructions?  Yes   Did the patient receive a copy of COVID-19 specific instructions?  Yes   Nursing interventions  Reviewed instructions with patient   What is the patient's perception of their health status since discharge?  Improving   Does the patient have any of the following symptoms?  Cough [States has a cough but he has seasonal allegeries so this is not uncommon]   Pulse Ox monitoring  None   Is the patient/caregiver able to teach back steps to recovery at home?  Set small, achievable goals for return to baseline health, Rest and rebuild strength, gradually increase  "activity, Eat a well-balance diet, Make a list of questions for provider's appointment   If the patient is a current smoker, are they able to teach back resources for cessation?  Not a smoker   Is the patient/caregiver able to teach back the hierarchy of who to call/visit for symptoms/problems? PCP, Specialist, Home health nurse, Urgent Care, ED, 911  Yes   Does the patient have scales?  Yes   Is the patient weighing daily?  Yes   Daily weight interventions  Education provided on importance of daily weight   Is the patient able to teach back Heart Failure diet management?  Yes   Is the patient able to teach back Heart Failure Zones?  Yes   Is the patient able to teach back signs and symptoms of worsening condition? (i.e. weight gain, shortness of air, etc.)  Yes   COVID-19 call completed?  Yes   Wrap up additional comments  States he is breathing is \"odd is the best way I can describe it\".  States will be sob when walking somewhere but on the way back will be fine.  Denies quesitons or needs at this time          Sussy Goff, BALBIR  "

## 2021-01-25 ENCOUNTER — APPOINTMENT (OUTPATIENT)
Dept: GENERAL RADIOLOGY | Facility: HOSPITAL | Age: 66
End: 2021-01-25

## 2021-01-25 ENCOUNTER — READMISSION MANAGEMENT (OUTPATIENT)
Dept: CALL CENTER | Facility: HOSPITAL | Age: 66
End: 2021-01-25

## 2021-01-25 ENCOUNTER — HOSPITAL ENCOUNTER (OUTPATIENT)
Facility: HOSPITAL | Age: 66
Discharge: HOME | End: 2021-01-28
Attending: INTERNAL MEDICINE | Admitting: INTERNAL MEDICINE

## 2021-01-25 ENCOUNTER — PREP FOR SURGERY (OUTPATIENT)
Dept: OTHER | Facility: HOSPITAL | Age: 66
End: 2021-01-25

## 2021-01-25 DIAGNOSIS — I71.20 THORACIC AORTIC ANEURYSM WITHOUT RUPTURE (HCC): ICD-10-CM

## 2021-01-25 DIAGNOSIS — I71.21 ASCENDING AORTIC ANEURYSM (HCC): Primary | ICD-10-CM

## 2021-01-25 DIAGNOSIS — I48.91 NEW ONSET ATRIAL FIBRILLATION (HCC): ICD-10-CM

## 2021-01-25 DIAGNOSIS — R07.9 CHEST PAIN IN ADULT: ICD-10-CM

## 2021-01-25 DIAGNOSIS — I71.21 ASCENDING AORTIC ANEURYSM (HCC): ICD-10-CM

## 2021-01-25 LAB
ANION GAP SERPL CALCULATED.3IONS-SCNC: 8 MMOL/L (ref 5–15)
BUN SERPL-MCNC: 18 MG/DL (ref 8–23)
BUN/CREAT SERPL: 21.2 (ref 7–25)
CALCIUM SPEC-SCNC: 9 MG/DL (ref 8.6–10.5)
CHLORIDE SERPL-SCNC: 105 MMOL/L (ref 98–107)
CO2 SERPL-SCNC: 26 MMOL/L (ref 22–29)
CREAT SERPL-MCNC: 0.85 MG/DL (ref 0.76–1.27)
GFR SERPL CREATININE-BSD FRML MDRD: 91 ML/MIN/1.73
GLUCOSE SERPL-MCNC: 111 MG/DL (ref 65–99)
POTASSIUM SERPL-SCNC: 4.9 MMOL/L (ref 3.5–5.2)
SODIUM SERPL-SCNC: 139 MMOL/L (ref 136–145)

## 2021-01-25 PROCEDURE — C1769 GUIDE WIRE: HCPCS | Performed by: INTERNAL MEDICINE

## 2021-01-25 PROCEDURE — 25010000002 FENTANYL CITRATE (PF) 100 MCG/2ML SOLUTION: Performed by: INTERNAL MEDICINE

## 2021-01-25 PROCEDURE — G0378 HOSPITAL OBSERVATION PER HR: HCPCS

## 2021-01-25 PROCEDURE — C1894 INTRO/SHEATH, NON-LASER: HCPCS | Performed by: INTERNAL MEDICINE

## 2021-01-25 PROCEDURE — 93460 R&L HRT ART/VENTRICLE ANGIO: CPT | Performed by: INTERNAL MEDICINE

## 2021-01-25 PROCEDURE — 0 IOPAMIDOL PER 1 ML: Performed by: INTERNAL MEDICINE

## 2021-01-25 PROCEDURE — 25010000002 MIDAZOLAM PER 1 MG: Performed by: INTERNAL MEDICINE

## 2021-01-25 PROCEDURE — C1760 CLOSURE DEV, VASC: HCPCS | Performed by: INTERNAL MEDICINE

## 2021-01-25 PROCEDURE — C1751 CATH, INF, PER/CENT/MIDLINE: HCPCS | Performed by: INTERNAL MEDICINE

## 2021-01-25 PROCEDURE — 93005 ELECTROCARDIOGRAM TRACING: CPT | Performed by: PHYSICIAN ASSISTANT

## 2021-01-25 PROCEDURE — 71046 X-RAY EXAM CHEST 2 VIEWS: CPT

## 2021-01-25 PROCEDURE — 80048 BASIC METABOLIC PNL TOTAL CA: CPT | Performed by: PHYSICIAN ASSISTANT

## 2021-01-25 PROCEDURE — 93010 ELECTROCARDIOGRAM REPORT: CPT | Performed by: INTERNAL MEDICINE

## 2021-01-25 RX ORDER — ASPIRIN 81 MG/1
81 TABLET ORAL DAILY
Status: DISCONTINUED | OUTPATIENT
Start: 2021-01-25 | End: 2021-01-28 | Stop reason: HOSPADM

## 2021-01-25 RX ORDER — ACETAMINOPHEN 325 MG/1
650 TABLET ORAL EVERY 4 HOURS PRN
Status: DISCONTINUED | OUTPATIENT
Start: 2021-01-25 | End: 2021-01-25 | Stop reason: HOSPADM

## 2021-01-25 RX ORDER — SODIUM CHLORIDE 0.9 % (FLUSH) 0.9 %
10 SYRINGE (ML) INJECTION AS NEEDED
Status: DISCONTINUED | OUTPATIENT
Start: 2021-01-25 | End: 2021-01-25 | Stop reason: HOSPADM

## 2021-01-25 RX ORDER — NITROGLYCERIN 0.4 MG/1
0.4 TABLET SUBLINGUAL
Status: CANCELLED | OUTPATIENT
Start: 2021-01-25

## 2021-01-25 RX ORDER — ACETAMINOPHEN 325 MG/1
650 TABLET ORAL EVERY 4 HOURS PRN
Status: DISCONTINUED | OUTPATIENT
Start: 2021-01-25 | End: 2021-01-28 | Stop reason: HOSPADM

## 2021-01-25 RX ORDER — MULTIPLE VITAMINS W/ MINERALS TAB 9MG-400MCG
1 TAB ORAL DAILY
COMMUNITY

## 2021-01-25 RX ORDER — SODIUM CHLORIDE 9 MG/ML
1-3 INJECTION, SOLUTION INTRAVENOUS CONTINUOUS
Status: DISCONTINUED | OUTPATIENT
Start: 2021-01-25 | End: 2021-01-28 | Stop reason: HOSPADM

## 2021-01-25 RX ORDER — CARVEDILOL 12.5 MG/1
25 TABLET ORAL 2 TIMES DAILY WITH MEALS
Status: DISCONTINUED | OUTPATIENT
Start: 2021-01-25 | End: 2021-01-28 | Stop reason: HOSPADM

## 2021-01-25 RX ORDER — SODIUM CHLORIDE 0.9 % (FLUSH) 0.9 %
3 SYRINGE (ML) INJECTION EVERY 12 HOURS SCHEDULED
Status: CANCELLED | OUTPATIENT
Start: 2021-01-25

## 2021-01-25 RX ORDER — SODIUM CHLORIDE 0.9 % (FLUSH) 0.9 %
3 SYRINGE (ML) INJECTION EVERY 12 HOURS SCHEDULED
Status: DISCONTINUED | OUTPATIENT
Start: 2021-01-25 | End: 2021-01-25 | Stop reason: HOSPADM

## 2021-01-25 RX ORDER — ATORVASTATIN CALCIUM 40 MG/1
40 TABLET, FILM COATED ORAL NIGHTLY
Status: DISCONTINUED | OUTPATIENT
Start: 2021-01-25 | End: 2021-01-28 | Stop reason: HOSPADM

## 2021-01-25 RX ORDER — FENTANYL CITRATE 50 UG/ML
INJECTION, SOLUTION INTRAMUSCULAR; INTRAVENOUS AS NEEDED
Status: DISCONTINUED | OUTPATIENT
Start: 2021-01-25 | End: 2021-01-25 | Stop reason: HOSPADM

## 2021-01-25 RX ORDER — ACETAMINOPHEN 325 MG/1
650 TABLET ORAL EVERY 4 HOURS PRN
Status: CANCELLED | OUTPATIENT
Start: 2021-01-25

## 2021-01-25 RX ORDER — SODIUM CHLORIDE 0.9 % (FLUSH) 0.9 %
10 SYRINGE (ML) INJECTION AS NEEDED
Status: CANCELLED | OUTPATIENT
Start: 2021-01-25

## 2021-01-25 RX ORDER — ASPIRIN 81 MG/1
81 TABLET ORAL EVERY MORNING
COMMUNITY
End: 2021-01-28 | Stop reason: HOSPADM

## 2021-01-25 RX ORDER — NITROGLYCERIN 0.4 MG/1
0.4 TABLET SUBLINGUAL
Status: DISCONTINUED | OUTPATIENT
Start: 2021-01-25 | End: 2021-01-25 | Stop reason: HOSPADM

## 2021-01-25 RX ORDER — MIDAZOLAM HYDROCHLORIDE 1 MG/ML
INJECTION INTRAMUSCULAR; INTRAVENOUS AS NEEDED
Status: DISCONTINUED | OUTPATIENT
Start: 2021-01-25 | End: 2021-01-25 | Stop reason: HOSPADM

## 2021-01-25 RX ORDER — LIDOCAINE HYDROCHLORIDE 10 MG/ML
INJECTION, SOLUTION EPIDURAL; INFILTRATION; INTRACAUDAL; PERINEURAL AS NEEDED
Status: DISCONTINUED | OUTPATIENT
Start: 2021-01-25 | End: 2021-01-25 | Stop reason: HOSPADM

## 2021-01-25 RX ADMIN — ATORVASTATIN CALCIUM 40 MG: 40 TABLET, FILM COATED ORAL at 20:58

## 2021-01-25 RX ADMIN — SODIUM CHLORIDE 2.68 ML/KG/HR: 9 INJECTION, SOLUTION INTRAVENOUS at 10:35

## 2021-01-25 RX ADMIN — CARVEDILOL 25 MG: 12.5 TABLET, FILM COATED ORAL at 20:58

## 2021-01-25 RX ADMIN — ASPIRIN 81 MG: 81 TABLET, FILM COATED ORAL at 12:29

## 2021-01-25 RX ADMIN — ATORVASTATIN CALCIUM 40 MG: 40 TABLET, FILM COATED ORAL at 21:04

## 2021-01-25 RX ADMIN — CARVEDILOL 25 MG: 12.5 TABLET, FILM COATED ORAL at 21:04

## 2021-01-25 NOTE — OUTREACH NOTE
COVID-19 Week 4 Survey      Responses   [facility] patient discharged from?  [facility]   Does the patient have one of the following disease processes/diagnoses(primary or secondary)?  COVID-19   COVID-19 underlying condition?  CHF   Revoke  Readmitted          Lakia Griffiths RN

## 2021-01-26 ENCOUNTER — APPOINTMENT (OUTPATIENT)
Dept: CARDIOLOGY | Facility: HOSPITAL | Age: 66
End: 2021-01-26

## 2021-01-26 LAB
ASCENDING AORTA: 5.7 CM
BH CV ECHO MEAS - AI DEC SLOPE: 288 CM/SEC^2
BH CV ECHO MEAS - AI MAX PG: 111.8 MMHG
BH CV ECHO MEAS - AI MAX VEL: 528.7 CM/SEC
BH CV ECHO MEAS - AI P1/2T: 537.7 MSEC
BH CV ECHO MEAS - AO MAX PG (FULL): 13.5 MMHG
BH CV ECHO MEAS - AO MAX PG: 15 MMHG
BH CV ECHO MEAS - AO MEAN PG (FULL): 7.7 MMHG
BH CV ECHO MEAS - AO MEAN PG: 8.6 MMHG
BH CV ECHO MEAS - AO ROOT AREA (BSA CORRECTED): 2
BH CV ECHO MEAS - AO ROOT AREA: 18.7 CM^2
BH CV ECHO MEAS - AO ROOT DIAM: 4.9 CM
BH CV ECHO MEAS - AO V2 MAX: 191.8 CM/SEC
BH CV ECHO MEAS - AO V2 MEAN: 137.6 CM/SEC
BH CV ECHO MEAS - AO V2 VTI: 39.1 CM
BH CV ECHO MEAS - ASC AORTA: 5.7 CM
BH CV ECHO MEAS - AVA(I,A): 1.7 CM^2
BH CV ECHO MEAS - AVA(I,D): 1.7 CM^2
BH CV ECHO MEAS - AVA(V,A): 1.6 CM^2
BH CV ECHO MEAS - AVA(V,D): 1.6 CM^2
BH CV ECHO MEAS - BSA(HAYCOCK): 2.6 M^2
BH CV ECHO MEAS - BSA: 2.5 M^2
BH CV ECHO MEAS - BZI_BMI: 34.7 KILOGRAMS/M^2
BH CV ECHO MEAS - BZI_METRIC_HEIGHT: 188 CM
BH CV ECHO MEAS - BZI_METRIC_WEIGHT: 122.5 KG
BH CV ECHO MEAS - EDV(CUBED): 156.4 ML
BH CV ECHO MEAS - EDV(MOD-SP2): 92.5 ML
BH CV ECHO MEAS - EDV(MOD-SP4): 118 ML
BH CV ECHO MEAS - EDV(TEICH): 140.6 ML
BH CV ECHO MEAS - EF(CUBED): 47.2 %
BH CV ECHO MEAS - EF(MOD-BP): 24 %
BH CV ECHO MEAS - EF(MOD-SP2): 30.3 %
BH CV ECHO MEAS - EF(MOD-SP4): 23.5 %
BH CV ECHO MEAS - EF(TEICH): 39.1 %
BH CV ECHO MEAS - EF_3D-VOL: 15 %
BH CV ECHO MEAS - ESV(CUBED): 82.6 ML
BH CV ECHO MEAS - ESV(MOD-SP2): 64.5 ML
BH CV ECHO MEAS - ESV(MOD-SP4): 90.3 ML
BH CV ECHO MEAS - ESV(TEICH): 85.6 ML
BH CV ECHO MEAS - FS: 19.2 %
BH CV ECHO MEAS - IVS/LVPW: 1
BH CV ECHO MEAS - IVSD: 1.3 CM
BH CV ECHO MEAS - LA DIMENSION: 3.7 CM
BH CV ECHO MEAS - LA/AO: 0.76
BH CV ECHO MEAS - LV DIASTOLIC VOL/BSA (35-75): 47.8 ML/M^2
BH CV ECHO MEAS - LV MASS(C)D: 297.5 GRAMS
BH CV ECHO MEAS - LV MASS(C)DI: 120.5 GRAMS/M^2
BH CV ECHO MEAS - LV MAX PG: 1.5 MMHG
BH CV ECHO MEAS - LV MEAN PG: 0.89 MMHG
BH CV ECHO MEAS - LV SYSTOLIC VOL/BSA (12-30): 36.6 ML/M^2
BH CV ECHO MEAS - LV V1 MAX: 61.8 CM/SEC
BH CV ECHO MEAS - LV V1 MEAN: 44.2 CM/SEC
BH CV ECHO MEAS - LV V1 VTI: 12.9 CM
BH CV ECHO MEAS - LVIDD: 5.4 CM
BH CV ECHO MEAS - LVIDS: 4.4 CM
BH CV ECHO MEAS - LVLD AP2: 8 CM
BH CV ECHO MEAS - LVLD AP4: 8.1 CM
BH CV ECHO MEAS - LVLS AP2: 7.9 CM
BH CV ECHO MEAS - LVLS AP4: 8.9 CM
BH CV ECHO MEAS - LVOT AREA (M): 4.9 CM^2
BH CV ECHO MEAS - LVOT AREA: 5 CM^2
BH CV ECHO MEAS - LVOT DIAM: 2.5 CM
BH CV ECHO MEAS - LVPWD: 1.3 CM
BH CV ECHO MEAS - RAP SYSTOLE: 8 MMHG
BH CV ECHO MEAS - RVSP: 39 MMHG
BH CV ECHO MEAS - SI(AO): 296.3 ML/M^2
BH CV ECHO MEAS - SI(CUBED): 29.9 ML/M^2
BH CV ECHO MEAS - SI(LVOT): 26.3 ML/M^2
BH CV ECHO MEAS - SI(MOD-SP2): 11.3 ML/M^2
BH CV ECHO MEAS - SI(MOD-SP4): 11.2 ML/M^2
BH CV ECHO MEAS - SI(TEICH): 22.3 ML/M^2
BH CV ECHO MEAS - SV(AO): 731.4 ML
BH CV ECHO MEAS - SV(CUBED): 73.8 ML
BH CV ECHO MEAS - SV(LVOT): 65 ML
BH CV ECHO MEAS - SV(MOD-SP2): 28 ML
BH CV ECHO MEAS - SV(MOD-SP4): 27.7 ML
BH CV ECHO MEAS - SV(TEICH): 55 ML
BH CV ECHO MEAS - TR MAX PG: 31 MMHG
BH CV ECHO MEAS - TR MAX VEL: 278 CM/SEC
BH CV VAS BP LEFT ARM: NORMAL MMHG

## 2021-01-26 PROCEDURE — 25010000002 MIDAZOLAM PER 1 MG: Performed by: INTERNAL MEDICINE

## 2021-01-26 PROCEDURE — 93312 ECHO TRANSESOPHAGEAL: CPT | Performed by: INTERNAL MEDICINE

## 2021-01-26 PROCEDURE — 99225 PR SBSQ OBSERVATION CARE/DAY 25 MINUTES: CPT | Performed by: INTERNAL MEDICINE

## 2021-01-26 PROCEDURE — 25010000002 FENTANYL CITRATE (PF) 100 MCG/2ML SOLUTION: Performed by: INTERNAL MEDICINE

## 2021-01-26 PROCEDURE — 93321 DOPPLER ECHO F-UP/LMTD STD: CPT

## 2021-01-26 PROCEDURE — 93325 DOPPLER ECHO COLOR FLOW MAPG: CPT

## 2021-01-26 PROCEDURE — G0378 HOSPITAL OBSERVATION PER HR: HCPCS

## 2021-01-26 PROCEDURE — 99152 MOD SED SAME PHYS/QHP 5/>YRS: CPT

## 2021-01-26 PROCEDURE — 93325 DOPPLER ECHO COLOR FLOW MAPG: CPT | Performed by: INTERNAL MEDICINE

## 2021-01-26 PROCEDURE — 93308 TTE F-UP OR LMTD: CPT

## 2021-01-26 PROCEDURE — 99153 MOD SED SAME PHYS/QHP EA: CPT

## 2021-01-26 PROCEDURE — 93312 ECHO TRANSESOPHAGEAL: CPT

## 2021-01-26 PROCEDURE — 93320 DOPPLER ECHO COMPLETE: CPT | Performed by: INTERNAL MEDICINE

## 2021-01-26 RX ORDER — FENTANYL CITRATE 50 UG/ML
INJECTION, SOLUTION INTRAMUSCULAR; INTRAVENOUS
Status: COMPLETED | OUTPATIENT
Start: 2021-01-26 | End: 2021-01-26

## 2021-01-26 RX ORDER — MIDAZOLAM HYDROCHLORIDE 1 MG/ML
INJECTION INTRAMUSCULAR; INTRAVENOUS
Status: COMPLETED | OUTPATIENT
Start: 2021-01-26 | End: 2021-01-26

## 2021-01-26 RX ADMIN — CARVEDILOL 25 MG: 12.5 TABLET, FILM COATED ORAL at 08:45

## 2021-01-26 RX ADMIN — ASPIRIN 81 MG: 81 TABLET, FILM COATED ORAL at 08:45

## 2021-01-26 RX ADMIN — FENTANYL CITRATE 50 MCG: 50 INJECTION, SOLUTION INTRAMUSCULAR; INTRAVENOUS at 13:40

## 2021-01-26 RX ADMIN — APIXABAN 5 MG: 5 TABLET, FILM COATED ORAL at 08:45

## 2021-01-26 RX ADMIN — METHOHEXITAL SODIUM 20 MG: 500 INJECTION, POWDER, LYOPHILIZED, FOR SOLUTION INTRAMUSCULAR; INTRAVENOUS; RECTAL at 13:40

## 2021-01-26 RX ADMIN — FENTANYL CITRATE 50 MCG: 50 INJECTION, SOLUTION INTRAMUSCULAR; INTRAVENOUS at 13:32

## 2021-01-26 RX ADMIN — APIXABAN 5 MG: 5 TABLET, FILM COATED ORAL at 20:12

## 2021-01-26 RX ADMIN — CARVEDILOL 25 MG: 12.5 TABLET, FILM COATED ORAL at 17:31

## 2021-01-26 RX ADMIN — ATORVASTATIN CALCIUM 40 MG: 40 TABLET, FILM COATED ORAL at 20:12

## 2021-01-26 RX ADMIN — METHOHEXITAL SODIUM 20 MG: 500 INJECTION, POWDER, LYOPHILIZED, FOR SOLUTION INTRAMUSCULAR; INTRAVENOUS; RECTAL at 13:34

## 2021-01-26 RX ADMIN — MIDAZOLAM HYDROCHLORIDE 2 MG: 1 INJECTION, SOLUTION INTRAMUSCULAR; INTRAVENOUS at 13:31

## 2021-01-27 ENCOUNTER — APPOINTMENT (OUTPATIENT)
Dept: CARDIOLOGY | Facility: HOSPITAL | Age: 66
End: 2021-01-27

## 2021-01-27 LAB
ASCENDING AORTA: 5.8 CM
BH CV ECHO MEAS - AO MAX PG: 26 MMHG
BH CV ECHO MEAS - AO MEAN PG: 12.8 MMHG
BH CV ECHO MEAS - AO V2 MAX: 257.8 CM/SEC
BH CV ECHO MEAS - AO V2 MEAN: 164.3 CM/SEC
BH CV ECHO MEAS - AO V2 VTI: 46.8 CM
BH CV ECHO MEAS - MR ALIAS VEL: 30.8 CM/SEC
BH CV ECHO MEAS - MR ERO: 0.15 CM^2
BH CV ECHO MEAS - MR FLOW RATE: 69.3 CM^3/SEC
BH CV ECHO MEAS - MR MAX PG: 89 MMHG
BH CV ECHO MEAS - MR MAX VEL: 469.9 CM/SEC
BH CV ECHO MEAS - MR MEAN PG: 56.9 MMHG
BH CV ECHO MEAS - MR MEAN VEL: 354.3 CM/SEC
BH CV ECHO MEAS - MR PISA RADIUS: 0.6 CM
BH CV ECHO MEAS - MR PISA: 2.2 CM^2
BH CV ECHO MEAS - MR VOLUME: 20.8 ML
BH CV ECHO MEAS - MR VTI: 141.3 CM
BH CV ECHO MEAS - MV AREA (1 DIAM): 13.7 CM^2
BH CV ECHO MEAS - MV DIAM: 4.2 CM
BH CV ECHO MEAS - MV FLOW AREA(1DIAM): 13.7 CM^2
BH CV ECHO MEAS - MV MAX PG: 4.8 MMHG
BH CV ECHO MEAS - MV MEAN PG: 2.6 MMHG
BH CV ECHO MEAS - MV V2 MAX: 109.6 CM/SEC
BH CV ECHO MEAS - MV V2 MEAN: 74.1 CM/SEC
BH CV ECHO MEAS - MV V2 VTI: 15.2 CM
BH CV ECHO MEAS - SV(MV 1 DIAM): 208.1 ML
MR PISA EROA: 0.15 CM2
PISA RADIUS: 0.6 CM

## 2021-01-27 PROCEDURE — 93350 STRESS TTE ONLY: CPT

## 2021-01-27 PROCEDURE — 93325 DOPPLER ECHO COLOR FLOW MAPG: CPT | Performed by: INTERNAL MEDICINE

## 2021-01-27 PROCEDURE — 99225 PR SBSQ OBSERVATION CARE/DAY 25 MINUTES: CPT | Performed by: INTERNAL MEDICINE

## 2021-01-27 PROCEDURE — 93321 DOPPLER ECHO F-UP/LMTD STD: CPT | Performed by: INTERNAL MEDICINE

## 2021-01-27 PROCEDURE — 93321 DOPPLER ECHO F-UP/LMTD STD: CPT

## 2021-01-27 PROCEDURE — 93018 CV STRESS TEST I&R ONLY: CPT | Performed by: INTERNAL MEDICINE

## 2021-01-27 PROCEDURE — 93017 CV STRESS TEST TRACING ONLY: CPT

## 2021-01-27 PROCEDURE — 25010000002 DOBUTAMINE 250 MG/20ML SOLUTION 20 ML VIAL: Performed by: PHYSICIAN ASSISTANT

## 2021-01-27 PROCEDURE — G0378 HOSPITAL OBSERVATION PER HR: HCPCS

## 2021-01-27 PROCEDURE — 93350 STRESS TTE ONLY: CPT | Performed by: INTERNAL MEDICINE

## 2021-01-27 RX ADMIN — DOBUTAMINE 5 MCG/KG/MIN: 12.5 INJECTION, SOLUTION, CONCENTRATE INTRAVENOUS at 14:37

## 2021-01-27 RX ADMIN — APIXABAN 5 MG: 5 TABLET, FILM COATED ORAL at 20:03

## 2021-01-27 RX ADMIN — ASPIRIN 81 MG: 81 TABLET, FILM COATED ORAL at 08:04

## 2021-01-27 RX ADMIN — CARVEDILOL 25 MG: 12.5 TABLET, FILM COATED ORAL at 17:20

## 2021-01-27 RX ADMIN — ATORVASTATIN CALCIUM 40 MG: 40 TABLET, FILM COATED ORAL at 20:02

## 2021-01-27 RX ADMIN — APIXABAN 5 MG: 5 TABLET, FILM COATED ORAL at 08:04

## 2021-01-27 RX ADMIN — CARVEDILOL 25 MG: 12.5 TABLET, FILM COATED ORAL at 08:04

## 2021-01-28 ENCOUNTER — READMISSION MANAGEMENT (OUTPATIENT)
Dept: CALL CENTER | Facility: HOSPITAL | Age: 66
End: 2021-01-28

## 2021-01-28 VITALS
HEIGHT: 74 IN | DIASTOLIC BLOOD PRESSURE: 113 MMHG | TEMPERATURE: 97.9 F | SYSTOLIC BLOOD PRESSURE: 142 MMHG | HEART RATE: 89 BPM | OXYGEN SATURATION: 95 % | WEIGHT: 270 LBS | RESPIRATION RATE: 20 BRPM | BODY MASS INDEX: 34.65 KG/M2

## 2021-01-28 DIAGNOSIS — I71.20 THORACIC AORTIC ANEURYSM WITHOUT RUPTURE (HCC): Primary | ICD-10-CM

## 2021-01-28 LAB
BH CV AS SEV - DOB STAGE 1 AO AREA: 2.02 CM2
BH CV AS SEV - DOB STAGE 1 AO MEAN GRADIENT: 6 MMHG
BH CV AS SEV - DOB STAGE 1 AO PEAK GRADIENT: 13 MMHG
BH CV AS SEV - DOB STAGE 1 AO VELOCITY: 178 CM/S
BH CV AS SEV - DOB STAGE 1 AO VTI: 30.1 CM
BH CV AS SEV - DOB STAGE 1 DI: 0.3
BH CV AS SEV - DOB STAGE 1 HEART RATE: 76 BPM
BH CV AS SEV - DOB STAGE 1 LVOT DIAMETER: 2.5 CM
BH CV AS SEV - DOB STAGE 1 LVOT VELOCITY: 63.2 CM/S
BH CV AS SEV - DOB STAGE 1 LVOT VTI: 12.4 CM
BH CV AS SEV - DOB STAGE 1 STAGE: 5 MICS
BH CV AS SEV - DOB STAGE 1 STROKE VOLUME: 61 ML
BH CV AS SEV - DOB STAGE 2 AO AREA: 1.74 CM2
BH CV AS SEV - DOB STAGE 2 AO MEAN GRADIENT: 8 MMHG
BH CV AS SEV - DOB STAGE 2 AO PEAK GRADIENT: 15 MMHG
BH CV AS SEV - DOB STAGE 2 AO VELOCITY: 191 CM/S
BH CV AS SEV - DOB STAGE 2 AO VTI: 36.3 CM
BH CV AS SEV - DOB STAGE 2 DI: 0.4
BH CV AS SEV - DOB STAGE 2 HEART RATE: 84 BPM
BH CV AS SEV - DOB STAGE 2 LVOT DIAMETER: 2.5 CM
BH CV AS SEV - DOB STAGE 2 LVOT VELOCITY: 63.9 CM/S
BH CV AS SEV - DOB STAGE 2 LVOT VTI: 12.9 CM
BH CV AS SEV - DOB STAGE 2 STAGE: 10 MICS
BH CV AS SEV - DOB STAGE 2 STROKE VOLUME: 63 ML
BH CV AS SEV - PEAK AO AREA: 1.63 CM2
BH CV AS SEV - PEAK AO MEAN GRADIENT: 18 MMHG
BH CV AS SEV - PEAK AO PEAK GRADIENT: 33 MMHG
BH CV AS SEV - PEAK AO VELOCITY: 287 CM/S
BH CV AS SEV - PEAK AO VTI: 48.5 CM
BH CV AS SEV - PEAK DI: 0.3
BH CV AS SEV - PEAK HEART RATE: 92 BPM
BH CV AS SEV - PEAK LVOT DIAMETER: 2.5 CM
BH CV AS SEV - PEAK LVOT VELOCITY: 85.7 CM/S
BH CV AS SEV - PEAK LVOT VTI: 16.1 CM
BH CV AS SEV - PEAK STAGE: 30 MICS
BH CV AS SEV - RESTING AO AREA: 2.25 CM2
BH CV AS SEV - RESTING AO MEAN GRADIENT: 6 MMHG
BH CV AS SEV - RESTING AO PEAK GRADIENT: 12 MMHG
BH CV AS SEV - RESTING AO VELOCITY: 170 CM/S
BH CV AS SEV - RESTING AO VTI: 30.3 CM
BH CV AS SEV - RESTING DI: 0
BH CV AS SEV - RESTING HEART RATE: 71 BPM
BH CV AS SEV - RESTING LVOT DIAMETER: 2.5 CM
BH CV AS SEV - RESTING LVOT VELOCITY: 69.9 CM/S
BH CV AS SEV - RESTING LVOT VTI: 13.9 CM
BH CV AS SEV - RESTING STAGE: 0
BH CV AS SEV - RESTING STROKE VOLUME: 68 ML
BH CV ECHO MEAS - AO MAX PG (FULL): 30.7 MMHG
BH CV ECHO MEAS - AO MAX PG: 32.7 MMHG
BH CV ECHO MEAS - AO MEAN PG (FULL): 17.1 MMHG
BH CV ECHO MEAS - AO MEAN PG: 18.2 MMHG
BH CV ECHO MEAS - AO V2 MAX: 286 CM/SEC
BH CV ECHO MEAS - AO V2 MEAN: 199.4 CM/SEC
BH CV ECHO MEAS - AO V2 VTI: 48.4 CM
BH CV ECHO MEAS - AVA(I,A): 1.5 CM^2
BH CV ECHO MEAS - AVA(I,D): 1.5 CM^2
BH CV ECHO MEAS - AVA(V,A): 1.3 CM^2
BH CV ECHO MEAS - AVA(V,D): 1.3 CM^2
BH CV ECHO MEAS - BSA(HAYCOCK): 2.6 M^2
BH CV ECHO MEAS - BSA: 2.5 M^2
BH CV ECHO MEAS - BZI_BMI: 34.7 KILOGRAMS/M^2
BH CV ECHO MEAS - BZI_METRIC_HEIGHT: 188 CM
BH CV ECHO MEAS - BZI_METRIC_WEIGHT: 122.5 KG
BH CV ECHO MEAS - LV MAX PG: 2 MMHG
BH CV ECHO MEAS - LV MEAN PG: 1.2 MMHG
BH CV ECHO MEAS - LV V1 MAX: 70.8 CM/SEC
BH CV ECHO MEAS - LV V1 MEAN: 49.5 CM/SEC
BH CV ECHO MEAS - LV V1 VTI: 13.9 CM
BH CV ECHO MEAS - LVOT AREA (M): 5.3 CM^2
BH CV ECHO MEAS - LVOT AREA: 5.3 CM^2
BH CV ECHO MEAS - LVOT DIAM: 2.6 CM
BH CV ECHO MEAS - SI(LVOT): 29.6 ML/M^2
BH CV ECHO MEAS - SV(LVOT): 73.1 ML
BH CV STRESS BP STAGE 1: NORMAL
BH CV STRESS BP STAGE 2: NORMAL
BH CV STRESS BP STAGE 3: NORMAL
BH CV STRESS BP STAGE 4: NORMAL
BH CV STRESS DOSE DOBUTAMINE STAGE 1: 5
BH CV STRESS DOSE DOBUTAMINE STAGE 2: 10
BH CV STRESS DOSE DOBUTAMINE STAGE 3: 20
BH CV STRESS DOSE DOBUTAMINE STAGE 4: 30
BH CV STRESS DURATION MIN STAGE 1: 2
BH CV STRESS DURATION MIN STAGE 2: 2
BH CV STRESS DURATION MIN STAGE 3: 3
BH CV STRESS DURATION MIN STAGE 4: 4
BH CV STRESS DURATION SEC STAGE 1: 25
BH CV STRESS DURATION SEC STAGE 2: 40
BH CV STRESS DURATION SEC STAGE 3: 10
BH CV STRESS DURATION SEC STAGE 4: 15
BH CV STRESS HR STAGE 1: 81
BH CV STRESS HR STAGE 2: 85
BH CV STRESS HR STAGE 3: 96
BH CV STRESS HR STAGE 4: 91
BH CV STRESS O2 STAGE 1: 96
BH CV STRESS O2 STAGE 2: 98
BH CV STRESS O2 STAGE 3: 97
BH CV STRESS O2 STAGE 4: 98
BH CV STRESS PROTOCOL 1: NORMAL
BH CV STRESS RATE STAGE 1: 37
BH CV STRESS RATE STAGE 2: 73
BH CV STRESS RATE STAGE 3: 146
BH CV STRESS RATE STAGE 4: 220
BH CV STRESS RECOVERY BP: NORMAL MMHG
BH CV STRESS RECOVERY HR: 79 BPM
BH CV STRESS RECOVERY O2: 98 %
BH CV STRESS STAGE 1: 1
BH CV STRESS STAGE 2: 2
BH CV STRESS STAGE 3: 3
BH CV STRESS STAGE 4: 4
BH CV VAS BP LEFT ARM: NORMAL MMHG
Lab: 0 MINS
Lab: 79 ML
MAXIMAL PREDICTED HEART RATE: 156 BPM
PERCENT MAX PREDICTED HR: 64.1 %
STRESS BASELINE BP: NORMAL MMHG
STRESS BASELINE HR: 79 BPM
STRESS O2 SAT REST: 98 %
STRESS PERCENT HR: 75 %
STRESS POST ESTIMATED WORKLOAD: 1 METS
STRESS POST EXERCISE DUR MIN: 13 MIN
STRESS POST EXERCISE DUR SEC: 25 SEC
STRESS POST O2 SAT PEAK: 98 %
STRESS POST PEAK BP: NORMAL MMHG
STRESS POST PEAK HR: 100 BPM
STRESS TARGET HR: 133 BPM

## 2021-01-28 PROCEDURE — 99217 PR OBSERVATION CARE DISCHARGE MANAGEMENT: CPT | Performed by: INTERNAL MEDICINE

## 2021-01-28 PROCEDURE — G0378 HOSPITAL OBSERVATION PER HR: HCPCS

## 2021-01-28 RX ADMIN — CARVEDILOL 25 MG: 12.5 TABLET, FILM COATED ORAL at 09:26

## 2021-01-28 RX ADMIN — APIXABAN 5 MG: 5 TABLET, FILM COATED ORAL at 09:26

## 2021-01-28 RX ADMIN — ASPIRIN 81 MG: 81 TABLET, FILM COATED ORAL at 09:26

## 2021-01-29 ENCOUNTER — READMISSION MANAGEMENT (OUTPATIENT)
Dept: CALL CENTER | Facility: HOSPITAL | Age: 66
End: 2021-01-29

## 2021-01-29 ENCOUNTER — DOCUMENTATION (OUTPATIENT)
Dept: CARDIAC REHAB | Facility: HOSPITAL | Age: 66
End: 2021-01-29

## 2021-01-29 NOTE — OUTREACH NOTE
Prep Survey      Responses   [facility]?  [facility]   Is LACE score < 7 ?  No   Emergency Room discharge w/ pulse ox?  No   Eligibility  Readm Mgmt   Discharge diagnosis  Thoracic aortic aneurysm without rupture New onset atrial fibrillation covid   Does the patient have one of the following disease processes/diagnoses(primary or secondary)?  COVID-19   Does the patient have Home health ordered?  No   Is there a DME ordered?  No   Prep survey completed?  Yes          Lyla Cohn RN

## 2021-01-29 NOTE — OUTREACH NOTE
COVID-19 Week 1 Survey      Responses   [facility] patient discharged from?  [place]   Does the patient have one of the following disease processes/diagnoses(primary or secondary)?  COVID-19   COVID-19 underlying condition?  CHF   Call Number  Call 1   Week 1 Call successful?  No   Discharge diagnosis  Thoracic aortic aneurysm without rupture New onset atrial fibrillation gaby Rivera RN

## 2021-01-29 NOTE — PROGRESS NOTES
"Referral received for Phase II Cardiac Rehab.  Staff has reviewed chart and patient does not have a qualifying diagnosis for Phase II Cardiac Rehab at this time. Patient must have a documented diagnosis of \"Chronic Systolic Heart Failure\" with an ejection fraction < 35%. Staff will follow up with Patient after surgery.   "

## 2021-01-30 ENCOUNTER — READMISSION MANAGEMENT (OUTPATIENT)
Dept: CALL CENTER | Facility: HOSPITAL | Age: 66
End: 2021-01-30

## 2021-01-30 NOTE — OUTREACH NOTE
COVID-19 Week 1 Survey      Responses   [facility] patient discharged from?  [place]   Does the patient have one of the following disease processes/diagnoses(primary or secondary)?  COVID-19   COVID-19 underlying condition?  CHF   Call Number  Call 2   Call end time  1604   Discharge diagnosis  Thoracic aortic aneurysm without rupture New onset atrial fibrillation covid   Meds reviewed with patient/caregiver?  Yes   Is the patient having any side effects they believe may be caused by any medication additions or changes?  No   Does the patient have all medications ordered at discharge?  Yes   Is the patient taking all medications as directed (includes completed medication regime)?  Yes   Comments regarding appointments  Pt. states he has a f/u appt. with PCP at [facility] in a couple of weeks.   Does the patient have a primary care provider?   Yes   Comments regarding PCP  2/2/21 PCP FU   Has the patient kept scheduled appointments due by today?  N/A   Has home health visited the patient within 72 hours of discharge?  N/A   Psychosocial issues?  No   Did the patient receive a copy of their discharge instructions?  Yes   Did the patient receive a copy of COVID-19 specific instructions?  Yes   Nursing interventions  Reviewed instructions with patient   What is the patient's perception of their health status since discharge?  Improving   Does the patient have any of the following symptoms?  None   Nursing Interventions  Nurse provided patient education   Pulse Ox monitoring  None   Is the patient/caregiver able to teach back steps to recovery at home?  Set small, achievable goals for return to baseline health, Rest and rebuild strength, gradually increase activity, Eat a well-balance diet, Make a list of questions for provider's appointment   If the patient is a current smoker, are they able to teach back resources for cessation?  Not a smoker   Is the patient/caregiver able to teach back the hierarchy  of who to call/visit for symptoms/problems? PCP, Specialist, Home health nurse, Urgent Care, ED, 911  Yes   Does the patient have scales?  Yes   Is the patient weighing daily?  Yes   Daily weight interventions  Education provided on importance of daily weight   Is the patient able to teach back Heart Failure diet management?  Yes   Is the patient able to teach back Heart Failure Zones?  Yes   Is the patient able to teach back signs and symptoms of worsening condition? (i.e. weight gain, shortness of air, etc.)  Yes   COVID-19 call completed?  Yes   Wrap up additional comments  Dr. Guerrier will be doing his surgery in the next 2 months. Breathing has improved.          Renate Mckeon RN

## 2021-02-03 ENCOUNTER — APPOINTMENT (OUTPATIENT)
Dept: CT IMAGING | Facility: HOSPITAL | Age: 66
End: 2021-02-03

## 2021-02-04 ENCOUNTER — OFFICE VISIT (OUTPATIENT)
Dept: CARDIOLOGY | Facility: HOSPITAL | Age: 66
End: 2021-02-04

## 2021-02-04 ENCOUNTER — HOSPITAL ENCOUNTER (OUTPATIENT)
Dept: CT IMAGING | Facility: HOSPITAL | Age: 66
Discharge: HOME | End: 2021-02-04

## 2021-02-04 VITALS
WEIGHT: 273.13 LBS | HEART RATE: 84 BPM | OXYGEN SATURATION: 97 % | BODY MASS INDEX: 35.05 KG/M2 | DIASTOLIC BLOOD PRESSURE: 85 MMHG | HEIGHT: 74 IN | TEMPERATURE: 96 F | RESPIRATION RATE: 21 BRPM | SYSTOLIC BLOOD PRESSURE: 133 MMHG

## 2021-02-04 DIAGNOSIS — I71.20 THORACIC AORTIC ANEURYSM WITHOUT RUPTURE (HCC): ICD-10-CM

## 2021-02-04 DIAGNOSIS — I71.20 THORACIC AORTIC ANEURYSM WITHOUT RUPTURE (HCC): Primary | ICD-10-CM

## 2021-02-04 DIAGNOSIS — I48.19 ATRIAL FIBRILLATION, PERSISTENT (HCC): ICD-10-CM

## 2021-02-04 DIAGNOSIS — I50.22 CHRONIC SYSTOLIC HEART FAILURE (HCC): ICD-10-CM

## 2021-02-04 PROCEDURE — 71275 CT ANGIOGRAPHY CHEST: CPT

## 2021-02-04 PROCEDURE — 25010000002 IOPAMIDOL 61 % SOLUTION: Performed by: PHYSICIAN ASSISTANT

## 2021-02-04 PROCEDURE — 99214 OFFICE O/P EST MOD 30 MIN: CPT | Performed by: NURSE PRACTITIONER

## 2021-02-04 PROCEDURE — 75635 CT ANGIO ABDOMINAL ARTERIES: CPT

## 2021-02-04 RX ADMIN — IOPAMIDOL 50 ML: 612 INJECTION, SOLUTION INTRAVENOUS at 17:25

## 2021-02-04 RX ADMIN — IOPAMIDOL 100 ML: 612 INJECTION, SOLUTION INTRAVENOUS at 17:25

## 2021-02-04 NOTE — PROGRESS NOTES
"Chief Complaint  Atrial Fibrillation and Establish Care    Subjective    History of Present Illness {  Problem List  Visit  Diagnosis   Encounters  Notes  Medications  Labs  Result Review Imaging  Media :23}       History of Present Illness   65-year-old male presents the office today for ongoing evaluation of his systolic heart failure, PAF and thoracic aortic aneurysm.  Patient recently underwent Left heart cath per Dr. Waldron on 1/25/2021 which showed severe global hypokinesis and EF of 20% moderate to severe severe pulmonary hypertension, normal coronary arteries.DIONISIO 1/27/2021 showed an EF of 21 to 25% bicuspid aortic valve, with severe dilation of the ascending aorta measuring 5.7 cm.  Moderate functional mitral valve regurgitation.  Dobutamine stress echo 1/28/2021 show baseline EF 25%.  Dobutamine LVEF improved to 35 to 40%, mean gradient 15 to 18 mmHg and ABIGAIL 1.4 cm².  Stroke-volume 79 mL.  He presents today with his home heart rate, blood pressure and weight log.  Weight loss of 2.5 lbs since discharge from the hospital.  Blood pressures been 120s to 130s systolic with heart rates 60s.  Patient notes overall he is feeling well.  Denies chest pain, dyspnea, palpitations, presyncope, syncope, orthopnea, PND or pedal edema.  Objective     Vital Signs:   Vitals:    02/04/21 1438 02/04/21 1441 02/04/21 1442   BP: 117/90 123/87 133/85   BP Location: Right arm Left arm Left arm   Patient Position: Sitting Sitting Standing   Cuff Size: Adult Adult Adult   Pulse: 82 75 84   Resp:   21   Temp:   96 °F (35.6 °C)   TempSrc:   Temporal   SpO2: 98% 97% 97%   Weight:   124 kg (273 lb 2 oz)   Height:   188 cm (74\")     Body mass index is 35.07 kg/m².  Physical Exam  Vitals signs and nursing note reviewed.   Constitutional:       Appearance: Normal appearance.   HENT:      Head: Normocephalic.   Eyes:      Pupils: Pupils are equal, round, and reactive to light.   Neck:      Musculoskeletal: Normal range of " motion.   Cardiovascular:      Rate and Rhythm: Normal rate and regular rhythm.      Pulses: Normal pulses.      Heart sounds: Normal heart sounds. No murmur.   Pulmonary:      Effort: Pulmonary effort is normal.      Breath sounds: Normal breath sounds.   Abdominal:      General: Bowel sounds are normal.      Palpations: Abdomen is soft.   Musculoskeletal: Normal range of motion.      Right lower leg: No edema.      Left lower leg: No edema.   Skin:     General: Skin is warm and dry.      Capillary Refill: Capillary refill takes less than 2 seconds.   Neurological:      Mental Status: He is alert and oriented to person, place, and time.   Psychiatric:         Mood and Affect: Mood normal.         Thought Content: Thought content normal.              Result Review  Data Reviewed:{ Labs  Result Review  Imaging  Med Tab  Media :23}   Basic Metabolic Panel (01/25/2021 10:35)  Basic Metabolic Panel (01/20/2021 08:39)  Magnesium (01/20/2021 00:12)  BNP (01/20/2021 00:12)  Comprehensive Metabolic Panel (01/20/2021 00:12)  CBC & Differential (01/20/2021 00:12)  Echocardiogram Stress Test For Aortic Valve Gradients (01/27/2021 13:47)  Adult Transthoracic Echo Limited W/ Cont if Necessary Per Protocol (01/26/2021 14:40)  Adult Transesophageal Echo (DIONISIO) W/ Cont if Necessary Per Protocol (01/26/2021 13:09)             Assessment and Plan {CC Problem List  Visit Diagnosis  ROS  Review (Popup)  Health Maintenance  Quality  BestPractice  Medications  SmartSets  SnapShot Encounters  Media :23}   1. Thoracic aortic aneurysm without rupture (CMS/HCC)  Upcoming appointment with Dr. Guerrier to discuss plan of care    2. Atrial fibrillation, persistent (CMS/HCC)  Rate controlled  Continue carvedilol  CHADS-VASc Risk Assessment            2       Total Score        1 CHF    1 Age 65-74        Criteria that do not apply:    Hypertension    Age >/= 75    DM    PRIOR STROKE/TIA/THROMBO    Vascular Disease    Sex: Female         Anticoagulated with eliquis and denies any s/s of bleeding   3. Chronic systolic heart failure (CMS/HCC)  Euvolemic  Continue carvedilol  Heart failure education today including signs and symptoms, the role of the heart failure center, daily weights, low sodium diet (less than 1500 mg per day), and daily physical activity. Reviewed HF Zones with patient and family.  Patient to continue current medications as previously ordered.         Follow Up {Instructions Charge Capture  Follow-up Communications :23}   Return if symptoms worsen or fail to improve.    Patient was given instructions and counseling regarding his condition or for health maintenance advice. Please see specific information pulled into the AVS if appropriate.  Patient was instructed to call the [facility] with any questions, concerns, or worsening symptoms.    *Please note that portions of this note were completed with a voice recognition program. Efforts were made to edit the dictations, but occasionally words are mistranscribed.

## 2021-02-08 ENCOUNTER — READMISSION MANAGEMENT (OUTPATIENT)
Dept: CALL CENTER | Facility: HOSPITAL | Age: 66
End: 2021-02-08

## 2021-02-08 ENCOUNTER — OFFICE VISIT (OUTPATIENT)
Dept: CARDIAC SURGERY | Facility: CLINIC | Age: 66
End: 2021-02-08

## 2021-02-08 VITALS
HEIGHT: 74 IN | BODY MASS INDEX: 35.04 KG/M2 | TEMPERATURE: 97.1 F | DIASTOLIC BLOOD PRESSURE: 99 MMHG | WEIGHT: 273 LBS | OXYGEN SATURATION: 97 % | SYSTOLIC BLOOD PRESSURE: 145 MMHG | HEART RATE: 87 BPM

## 2021-02-08 DIAGNOSIS — I71.20 THORACIC AORTIC ANEURYSM WITHOUT RUPTURE (HCC): Primary | ICD-10-CM

## 2021-02-08 DIAGNOSIS — Q23.1 BICUSPID AORTIC VALVE: ICD-10-CM

## 2021-02-08 PROBLEM — Q23.81 BICUSPID AORTIC VALVE: Status: ACTIVE | Noted: 2021-02-08

## 2021-02-08 PROCEDURE — 99204 OFFICE O/P NEW MOD 45 MIN: CPT | Performed by: THORACIC SURGERY (CARDIOTHORACIC VASCULAR SURGERY)

## 2021-02-08 NOTE — PROGRESS NOTES
02/08/2021  Patient Information  Yeison Bryant                                                                                          [address] [facility] [facility] [address]   1955  'PCP/Referring Physician'  Kreis, Samuel Duane, MD  [phone]  No ref. provider found    Chief Complaint   Patient presents with   • Consult     Np referred for an ascending aortic aneurysm found during a work up for shortness of breath.   • Thoracic Aneurysm       History of Present Illness:  The patient is a 65-year-old male who I saw in the hospital at the request of Dr. Mahendra Waldron to evaluate an ascending aortic aneurysm and a bicuspid aortic valve. His coronary arteries are normal.  This patient did have Covid in mid December, 2020.  At this time, he has some shortness of breath and mild congestive failure symptoms with exertion. He is here to further discuss potential surgery.  He has a number of questions regarding the procedure.  Some of the information was already obtained during the patient's hospitalization and from his chart from other physicians.  Also, I discussed this case with Dr. Mahendra Waldron.    Patient Active Problem List   Diagnosis   • CHF (congestive heart failure) (CMS/HCC)   • New onset atrial fibrillation (CMS/HCC)   • Former smoker   • Hyperglycemia   • Cardiomegaly   • COVID-19 virus detected   • Obesity (BMI 30-39.9)   • Chest pain in adult   • Thoracic aortic aneurysm without rupture (CMS/HCC)   • Bicuspid aortic valve     Past Medical History:   Diagnosis Date   • Arrhythmia    • Arthritis    • Atrial fibrillation (CMS/HCC)    • Cataract    • CHF (congestive heart failure) (CMS/HCC) 01/18/2021   • COVID-19 12/2020   • Dyslipidemia    • Former smoker    • Hypertension    • Obesity (BMI 30-39.9)      Past Surgical History:   Procedure Laterality Date   • CARDIAC CATHETERIZATION Left 1/25/2021    Procedure: Left Heart Cath - COVID+ 12/27;  Surgeon: Mahendra Waldron MD;  Location: [facility] CATH INVASIVE  LOCATION;  Service: Cardiology;  Laterality: Left;   • CARDIAC CATHETERIZATION N/A 2021    Procedure: Aortic root aortogram;  Surgeon: Mahendra Waldron MD;  Location: [facility] INVASIVE LOCATION;  Service: Cardiology;  Laterality: N/A;   • CATARACT EXTRACTION     • COLONOSCOPY     • EYE SURGERY      lasic and cataract   • KNEE SURGERY Bilateral    • TONSILLECTOMY         Current Outpatient Medications:   •  apixaban (ELIQUIS) 5 MG tablet tablet, Take 1 tablet by mouth Every 12 (Twelve) Hours. Indications: Atrial Fibrillation, Disp: 60 tablet, Rfl: 11  •  atorvastatin (LIPITOR) 40 MG tablet, Take 1 tablet by mouth Every Night for 30 days., Disp: 30 tablet, Rfl: 0  •  carvedilol (Coreg) 25 MG tablet, Take 1 tablet by mouth 2 (Two) Times a Day With Meals for 30 days., Disp: 60 tablet, Rfl: 0  •  guaiFENesin (MUCINEX) 600 MG 12 hr tablet, Take 600 mg by mouth 2 (Two) Times a Day As Needed for Cough., Disp: , Rfl:   •  multivitamin with minerals (MULTIVITAMIN ADULTS PO), Take 1 tablet by mouth Daily., Disp: , Rfl:   No Known Allergies  Social History     Socioeconomic History   • Marital status:      Spouse name: Not on file   • Number of children: 2   • Years of education: Not on file   • Highest education level: Not on file   Occupational History   • Occupation: small business owner   Tobacco Use   • Smoking status: Former Smoker     Packs/day: 2.00     Years: 36.00     Pack years: 72.00     Types: Cigarettes     Quit date: 2008     Years since quittin.0   • Smokeless tobacco: Never Used   Substance and Sexual Activity   • Alcohol use: Yes     Comment: OCCASIONAL ALCOHOL    • Drug use: Never   • Sexual activity: Defer   Social History Narrative    Caffeine 1 serving of coffee in morning. Lives in [facility]     Family History   Adopted: Yes   Family history unknown: Yes     Review of Systems   Constitution: Positive for malaise/fatigue. Negative for chills, fever, night sweats and weight loss.  "  HENT: Negative for hearing loss, odynophagia and sore throat.    Cardiovascular: Positive for dyspnea on exertion, leg swelling and palpitations. Negative for chest pain and orthopnea.   Respiratory: Positive for cough. Negative for hemoptysis.    Endocrine: Negative for cold intolerance, heat intolerance, polydipsia, polyphagia and polyuria.   Hematologic/Lymphatic: Does not bruise/bleed easily.   Skin: Negative for itching and rash.   Musculoskeletal: Positive for joint pain. Negative for joint swelling and myalgias.   Gastrointestinal: Negative.  Negative for abdominal pain, constipation, diarrhea, hematemesis, hematochezia, melena, nausea and vomiting.   Genitourinary: Negative.  Negative for dysuria, frequency and hematuria.   Neurological: Negative for focal weakness, headaches, numbness and seizures.   Psychiatric/Behavioral: Negative for suicidal ideas.   All other systems reviewed and are negative.    Vitals:    02/08/21 0713   BP: 145/99   BP Location: Right arm   Patient Position: Sitting   Pulse: 87   Temp: 97.1 °F (36.2 °C)   SpO2: 97%   Weight: 124 kg (273 lb)   Height: 188 cm (74\")      Physical Exam    CONSTITUTIONAL: Alert and conversant, Well dressed, Well nourished, No acute distress  EYES: Sclera clean, Anicteric, Pupils equal  ENT: No nasal deviation, Trachea midline  NECK: No neck masses, Supple  LUNGS: No wheezing, Cough, non-congested  HEART: No rubs, soft murmur to the right of the sternal border  GASTROINTESTINAL: Soft, non-distended, No masses, Non tender  to palpation, normal bowel sounds  NEURO: No motor deficits, No sensory deficits, Cranial Nerves 2 through 12 grossly intact  PSYCHIATRIC: Oriented to person, place and time, No memory deficits, Mood appropriate  VASCULAR: No carotid bruits, Femoral pulses palpable and symmetric  MUSKULOSKELETAL: No extremity trauma or extremity asymmetry    The ROS, past medical history, surgical history, family history, social history and vitals were " reviewed by myself and corrected as needed.      Labs/Imaging:   The patient has a 5.6 to 5.7 cm ascending aorta and a bicuspid aortic valve.  No significant coronary disease on heart catheterization images.    Assessment/Plan:   The patient is a 65-year-old male who is being referred for an ascending thoracic aneurysm. I have discussed various options and choices with this patient.  He is very much agreeable to a bioprosthetic valve.  I do not believe his aorta is truly aneurysmal in terms of its connective tissue make-up.  Rather, this is probably, some post stenotic dilatation associated with his bicuspid valve.  We plan to replace his ascending aorta with a dacron graft and replace his aortic valve with a bioprosthetic tissue valve.  He is aware that this valve potentially could degenerate in his lifetime. Then we could proceed with a transcatheter valve.  He is also aware that surgery has overall risks of strokes bleedings infection and death and no guarantees have been made as to the  outcome.  We will try to schedule this in the last week in February or first week in March.    Patient Active Problem List   Diagnosis   • CHF (congestive heart failure) (CMS/HCC)   • New onset atrial fibrillation (CMS/HCC)   • Former smoker   • Hyperglycemia   • Cardiomegaly   • COVID-19 virus detected   • Obesity (BMI 30-39.9)   • Chest pain in adult   • Thoracic aortic aneurysm without rupture (CMS/HCC)   • Bicuspid aortic valve     CC:  MD Cecy Burnett editing for Evgeny Guerrier MD    I, Evgeny Guerrier MD, have read and agree with the editing done by Cecy Pang, .

## 2021-02-08 NOTE — OUTREACH NOTE
COVID-19 Week 2 Survey      Responses   [facility] patient discharged from?  [place]   Does the patient have one of the following disease processes/diagnoses(primary or secondary)?  COVID-19   COVID-19 underlying condition?  CHF   Call Number  Call 1   COVID-19 Week 2: Call 1 attempt successful?  Yes   Call start time  1231   Call end time  1234   Discharge diagnosis  Thoracic aortic aneurysm without rupture New onset atrial fibrillation covid   Is the patient having any side effects they believe may be caused by any medication additions or changes?  No   Does the patient have all medications ordered at discharge?  Yes   Is the patient taking all medications as directed (includes completed medication regime)?  Yes   Comments regarding appointments  pt. had f/u with PCP   Does the patient have a primary care provider?   Yes   Has the patient kept scheduled appointments due by today?  N/A   Has home health visited the patient within 72 hours of discharge?  N/A   Psychosocial issues?  No   Did the patient receive a copy of their discharge instructions?  Yes   Did the patient receive a copy of COVID-19 specific instructions?  Yes   Nursing interventions  Reviewed instructions with patient   What is the patient's perception of their health status since discharge?  Improving   Does the patient have any of the following symptoms?  None   Nursing Interventions  Nurse provided patient education   Pulse Ox monitoring  None   Is the patient/caregiver able to teach back steps to recovery at home?  Eat a well-balance diet, Rest and rebuild strength, gradually increase activity   Is the patient/caregiver able to teach back the hierarchy of who to call/visit for symptoms/problems? PCP, Specialist, Home health nurse, Urgent Care, ED, 911  Yes   Does the patient have scales?  Yes   Is the patient weighing daily?  Yes   COVID-19 call completed?  Yes   Wrap up additional comments  Pt. states he is doing well and has f/u appts.  with PCP and Specialists. No questions or concerns at this time.          Mitali Morales, RN

## 2021-02-08 NOTE — DISCHARGE SUMMARY
[facility]  DISCHARGE SUMMARY       Date of Admission: 1/25/2021  Date of Discharge:  1/28/2021  Primary Care Physician: Kreis, Samuel Duane, MD  Attending Physician: Mahendra Waldron MD  Consulting Physician: Dr. Evgeny Guerrier     Discharge Diagnoses:  1. Ascending aortic aneurysm  a. 5.6cm on CT chest   2. Low flow- low gradient Aortic stenosis  a. Right and LHC 1/25/21: severe global hypokinesia and EF 20%, mod-severe PAH with elevated LVEDP; cardiac output low at 4.9L/min, normal coronaries, dilated aortic root   b. DIONISIO 1/26/21: EF 21-25%, bicuspid aortic valve with mild AS, moderate functional MR, severe dilation of ascending aorta measuring 5.7cm, RV is mildly dilated   c. Dobutamine stress echo 1/27/21: Best diagnosis is low flow, low gradient aortic stenosis.  3. Atrial fibrillation   a. Diagnosed 1/18/21 at time of hospitalization in [place]  b. CHADsVASC=2  c. Echo 1/19/21: EF 56-60%, LA is mildly increased, RVSP 42mmHg, moderate dilation of the aortic root   4. Covid 19 infection 12/27/20  5. History of tobacco abuse, inactive 12 years ago   6. Probable ALESSANDRO   7. Obesity    Hospital Course:   Patient is a 65 y.o. male with history of remote tobacco abuse, obesity and probable ALESSANDRO who was recently admitted in [facility] for acute DHF and newly diagnosed atrial fibrillation. During that admission he was found to have a 5.6 cm ascending aneurysm and he was referred for cardiac cath prior to vascular surgery. Right and LHC on day of admission showed severe global hypokinesia with EF 20% and normal coronaries. He had mod-severe PAH with elevated LVEDP and he was admitted for diuresis and further evaluation. After DIONISIO and Dobutamine stress echo reviewed with Dr. Hudson, best diagnosis is low flow, low gradient severe AS with ascending aortic aneurysm measuring 5.7cm. Case was discussed with Dr. Guerrier who agrees with diagnosis and is scheduling surgery in  "upcoming weeks. Patient was ambulatory, stable and felt ready for discharge home on 1/28/21.     Procedures Performed  Procedure(s):  Right and Left Heart Cath - COVID+ 12/27  Aortic root aortogram:  Final Impression:  #1: Abnormal left ventriculogram with severe global hypokinesia of the left ventricle and an estimated ejection fraction of 20%.                               #2:  There is moderate to severe pulmonary hypertension that is associated with an elevated left ventricular end-diastolic pressure.                               #3:  The forward cardiac output is abnormal (low).                              #4:  Because the patient's rhythm is atrial fibrillation (I did not record simultaneous LV and Ao pressures) I cannot be certain as to whether or not the patient has a systolic gradient across the aortic valve (although if present, it will be low).                              #5:  The coronary arteries are essentially normal.                               #6:   The aortic root is dilated.     THE most plausible \"unifying diagnosis\" at this time is aortic valve disease with severe low output, low gradient valvular aortic stenosis.  Alternatively, the patient could have a nonischemic cardiomyopathy with incidental dilatation of the ascending aorta.  The patient will undergo a transesophageal echo on the afternoon of January 26.  I have discussed this in detail with the patient and his son.       Pertinent Test Results:   DIONISIO 1/26/21:  Interpretation Summary       · Left ventricular ejection fraction appears to be 21 - 25%. Left ventricular systolic function is severely decreased.  · The left ventricular cavity is mildly dilated.  · Mild aortic valve stenosis is present.  · A bicuspid aortic valve is present.  · Severe dilation of the ascending aorta is present. Measuring 5.7 cm.  · Moderate functional mitral valve regurgitation is present with a centrally-directed jet noted.  · The right ventricular cavity " "is mildly dilated.  · Mildly reduced right ventricular systolic function noted.        Dobutamine stress echo 1/27/21:  Interpretation Summary    · Patient denied any chest discomfort/pain during doubutamine infusion  · Normal blood pressure response to dobutamine. The patient's heart rate response appeared blunted.  · No ST segment shift from baseline was seen during the dobutamine infusion. The patient was in atrial fibrillation throughout the infusion.  · Baseline: LVEF estimated 25%. Mean gradient 6 mmHg with a calculated aortic valve area of 2.4 cm². Stroke-volume 61 mL.  · Dobutamine 30 mcg: LVEF estimated 35 to 40%. Mean gradient 15-18 mmHg and calculated aortic valve area of 1.4 cm². Stroke-volume 79 mL.  · Aortic valve replacement may benefit this patient based on his response to dobutamine with an increase in stroke volume of 32%, tripling of his aortic valve gradient (from 6 mmHg to nearly 18 mmHg) and a fall in aortic valve area with dobutamine.     Lab Results   Component Value Date    WBC 12.26 (H) 01/20/2021    HGB 15.3 01/20/2021    HCT 46.1 01/20/2021    MCV 92.8 01/20/2021     01/20/2021     Lab Results   Component Value Date    GLUCOSE 111 (H) 01/25/2021    BUN 18 01/25/2021    CREATININE 0.85 01/25/2021    EGFRIFNONA 91 01/25/2021    BCR 21.2 01/25/2021    K 4.9 01/25/2021    CO2 26.0 01/25/2021    CALCIUM 9.0 01/25/2021    ALBUMIN 4.13 01/20/2021    AST 18 01/20/2021    ALT 20 01/20/2021     Lab Results   Component Value Date    HGBA1C 6.00 (H) 01/18/2021     Lab Results   Component Value Date    CHOL 181 01/19/2021    TRIG 124 01/19/2021    HDL 56 01/19/2021     (H) 01/19/2021     Lab Results   Component Value Date    TSH 3.730 01/19/2021     Physical Exam on Discharge:BP (!) 142/113 (BP Location: Left arm, Patient Position: Lying)   Pulse 89   Temp 97.9 °F (36.6 °C) (Oral)   Resp 20   Ht 188 cm (74\")   Wt 122 kg (270 lb)   SpO2 95%   BMI 34.67 kg/m²     General Appearance " No acute distress   Neck No adenopathy, supple, trachea midline,no JVD   Lungs Clear to auscultation,respirations regular, even and unlabored   Heart Regular rhythm and normal rate no murmur, no gallop, no rub, no click   Chest wall No abnormalities observed   Abdomen Normal bowel sounds, no masses, no hepatomegaly, soft   Extremities Moves all extremities well, no edema, no cyanosis, no redness   Neurological Alert and oriented x 3     Discharge Medications     Discharge Medications      New Medications      Instructions Start Date   Eliquis 5 MG tablet tablet  Generic drug: apixaban   5 mg, Oral, Every 12 Hours Scheduled         Continue These Medications      Instructions Start Date   atorvastatin 40 MG tablet  Commonly known as: LIPITOR   40 mg, Oral, Nightly      carvedilol 25 MG tablet  Commonly known as: Coreg   25 mg, Oral, 2 Times Daily With Meals      guaiFENesin 600 MG 12 hr tablet  Commonly known as: MUCINEX   600 mg, Oral, 2 Times Daily PRN      multivitamin with minerals tablet tablet   1 tablet, Oral, Daily         Stop These Medications    aspirin 81 MG EC tablet            Discharge Diet: cardiac     Activity at Discharge: as tolerated, no strenuous activity     Condition on Discharge: stable    Follow-up Appointments  · Dr. Guerrier will call with appointment       Time: Discharge > 30 min     Scribed for Mahendra Waldron MD by Eleanor Novak PA-C. 2/8/2021  13:42 EST

## 2021-02-17 ENCOUNTER — TELEPHONE (OUTPATIENT)
Dept: CARDIOLOGY | Facility: HOSPITAL | Age: 66
End: 2021-02-17

## 2021-02-17 RX ORDER — CARVEDILOL 25 MG/1
25 TABLET ORAL 2 TIMES DAILY WITH MEALS
Qty: 60 TABLET | Refills: 5 | Status: SHIPPED | OUTPATIENT
Start: 2021-02-17 | End: 2021-02-19 | Stop reason: SDUPTHER

## 2021-02-17 RX ORDER — ATORVASTATIN CALCIUM 40 MG/1
40 TABLET, FILM COATED ORAL NIGHTLY
Qty: 30 TABLET | Refills: 5 | Status: SHIPPED | OUTPATIENT
Start: 2021-02-17 | End: 2021-02-19 | Stop reason: SDUPTHER

## 2021-02-17 NOTE — TELEPHONE ENCOUNTER
[facility] [place] Telephone Note for:    Yeison Thompsondwell, 1955  Home Phone [phone]   Mobile [phone]       Reason for Call:  Med refill    Symptoms:      Onset::      Anything Tried?:      Other Pertinent Information (Weight, Vitals, etc.):  Patient requesting a refill of atorvastatin 40 mg nightly and carvedilol 25 mg bid.  [facility] in [place], [place]

## 2021-02-19 ENCOUNTER — PREP FOR SURGERY (OUTPATIENT)
Dept: OTHER | Facility: HOSPITAL | Age: 66
End: 2021-02-19

## 2021-02-19 ENCOUNTER — TELEPHONE (OUTPATIENT)
Dept: CARDIOLOGY | Facility: HOSPITAL | Age: 66
End: 2021-02-19

## 2021-02-19 DIAGNOSIS — I71.20 THORACIC AORTIC ANEURYSM WITHOUT RUPTURE (HCC): Primary | ICD-10-CM

## 2021-02-19 DIAGNOSIS — Q23.1 BICUSPID AORTIC VALVE: ICD-10-CM

## 2021-02-19 RX ORDER — ATORVASTATIN CALCIUM 40 MG/1
40 TABLET, FILM COATED ORAL NIGHTLY
Qty: 30 TABLET | Refills: 5 | Status: SHIPPED | OUTPATIENT
Start: 2021-02-19 | End: 2021-03-09 | Stop reason: HOSPADM

## 2021-02-19 RX ORDER — CARVEDILOL 25 MG/1
25 TABLET ORAL 2 TIMES DAILY WITH MEALS
Qty: 60 TABLET | Refills: 5 | Status: ON HOLD | OUTPATIENT
Start: 2021-02-19 | End: 2021-03-09 | Stop reason: SDUPTHER

## 2021-02-22 ENCOUNTER — PREP FOR SURGERY (OUTPATIENT)
Dept: OTHER | Facility: HOSPITAL | Age: 66
End: 2021-02-22

## 2021-02-22 DIAGNOSIS — I71.20 THORACIC AORTIC ANEURYSM WITHOUT RUPTURE (HCC): Primary | ICD-10-CM

## 2021-02-22 DIAGNOSIS — Z23 IMMUNIZATION DUE: ICD-10-CM

## 2021-02-22 RX ORDER — NITROGLYCERIN 0.4 MG/1
0.4 TABLET SUBLINGUAL
Status: CANCELLED | OUTPATIENT
Start: 2021-03-03

## 2021-02-22 RX ORDER — CHLORHEXIDINE GLUCONATE 0.12 MG/ML
15 RINSE ORAL ONCE
Status: CANCELLED | OUTPATIENT
Start: 2021-03-03 | End: 2021-03-03

## 2021-02-22 RX ORDER — CHLORHEXIDINE GLUCONATE 500 MG/1
1 CLOTH TOPICAL EVERY 12 HOURS PRN
Status: CANCELLED | OUTPATIENT
Start: 2021-03-03

## 2021-02-22 RX ORDER — ACETAMINOPHEN 325 MG/1
650 TABLET ORAL EVERY 4 HOURS PRN
Status: CANCELLED | OUTPATIENT
Start: 2021-03-03

## 2021-02-22 RX ORDER — CHLORHEXIDINE GLUCONATE 500 MG/1
1 CLOTH TOPICAL EVERY 12 HOURS PRN
Status: CANCELLED | OUTPATIENT
Start: 2021-03-01

## 2021-02-22 RX ORDER — ASPIRIN 325 MG
325 TABLET ORAL NIGHTLY
Status: CANCELLED | OUTPATIENT
Start: 2021-03-01 | End: 2021-03-02

## 2021-03-01 ENCOUNTER — HOSPITAL ENCOUNTER (OUTPATIENT)
Dept: GENERAL RADIOLOGY | Facility: HOSPITAL | Age: 66
Discharge: HOME | End: 2021-03-01

## 2021-03-01 ENCOUNTER — APPOINTMENT (OUTPATIENT)
Dept: PREADMISSION TESTING | Facility: HOSPITAL | Age: 66
End: 2021-03-01

## 2021-03-01 ENCOUNTER — HOSPITAL ENCOUNTER (OUTPATIENT)
Dept: PULMONOLOGY | Facility: HOSPITAL | Age: 66
Discharge: HOME | End: 2021-03-01

## 2021-03-01 VITALS — BODY MASS INDEX: 35.65 KG/M2 | OXYGEN SATURATION: 96 % | HEIGHT: 74 IN | WEIGHT: 277.8 LBS

## 2021-03-01 DIAGNOSIS — I71.20 THORACIC AORTIC ANEURYSM WITHOUT RUPTURE (HCC): ICD-10-CM

## 2021-03-01 LAB
ABO GROUP BLD: NORMAL
ALBUMIN SERPL-MCNC: 4.5 G/DL (ref 3.5–5.2)
ALBUMIN/GLOB SERPL: 2.1 G/DL
ALP SERPL-CCNC: 96 U/L (ref 39–117)
ALT SERPL W P-5'-P-CCNC: 24 U/L (ref 1–41)
AMPHET+METHAMPHET UR QL: NEGATIVE
AMPHETAMINES UR QL: NEGATIVE
ANION GAP SERPL CALCULATED.3IONS-SCNC: 10 MMOL/L (ref 5–15)
APTT PPP: 32.5 SECONDS (ref 24–37)
AST SERPL-CCNC: 23 U/L (ref 1–40)
BARBITURATES UR QL SCN: NEGATIVE
BASOPHILS # BLD AUTO: 0.03 10*3/MM3 (ref 0–0.2)
BASOPHILS NFR BLD AUTO: 0.3 % (ref 0–1.5)
BENZODIAZ UR QL SCN: NEGATIVE
BILIRUB SERPL-MCNC: 1.2 MG/DL (ref 0–1.2)
BLD GP AB SCN SERPL QL: NEGATIVE
BUN SERPL-MCNC: 14 MG/DL (ref 8–23)
BUN/CREAT SERPL: 13.2 (ref 7–25)
BUPRENORPHINE SERPL-MCNC: NEGATIVE NG/ML
CALCIUM SPEC-SCNC: 9.5 MG/DL (ref 8.6–10.5)
CANNABINOIDS SERPL QL: NEGATIVE
CHLORIDE SERPL-SCNC: 105 MMOL/L (ref 98–107)
CO2 SERPL-SCNC: 29 MMOL/L (ref 22–29)
COCAINE UR QL: NEGATIVE
CREAT SERPL-MCNC: 1.06 MG/DL (ref 0.76–1.27)
DEPRECATED RDW RBC AUTO: 45 FL (ref 37–54)
EOSINOPHIL # BLD AUTO: 0.09 10*3/MM3 (ref 0–0.4)
EOSINOPHIL NFR BLD AUTO: 1 % (ref 0.3–6.2)
ERYTHROCYTE [DISTWIDTH] IN BLOOD BY AUTOMATED COUNT: 13.2 % (ref 12.3–15.4)
GFR SERPL CREATININE-BSD FRML MDRD: 70 ML/MIN/1.73
GLOBULIN UR ELPH-MCNC: 2.1 GM/DL
GLUCOSE SERPL-MCNC: 130 MG/DL (ref 65–99)
HBA1C MFR BLD: 5.9 % (ref 4.8–5.6)
HCT VFR BLD AUTO: 47.4 % (ref 37.5–51)
HGB BLD-MCNC: 15.6 G/DL (ref 13–17.7)
IMM GRANULOCYTES # BLD AUTO: 0.02 10*3/MM3 (ref 0–0.05)
IMM GRANULOCYTES NFR BLD AUTO: 0.2 % (ref 0–0.5)
INR PPP: 0.98 (ref 0.85–1.16)
LYMPHOCYTES # BLD AUTO: 2.63 10*3/MM3 (ref 0.7–3.1)
LYMPHOCYTES NFR BLD AUTO: 29.1 % (ref 19.6–45.3)
MAGNESIUM SERPL-MCNC: 1.8 MG/DL (ref 1.6–2.4)
MCH RBC QN AUTO: 30.7 PG (ref 26.6–33)
MCHC RBC AUTO-ENTMCNC: 32.9 G/DL (ref 31.5–35.7)
MCV RBC AUTO: 93.3 FL (ref 79–97)
METHADONE UR QL SCN: NEGATIVE
MONOCYTES # BLD AUTO: 0.45 10*3/MM3 (ref 0.1–0.9)
MONOCYTES NFR BLD AUTO: 5 % (ref 5–12)
NEUTROPHILS NFR BLD AUTO: 5.83 10*3/MM3 (ref 1.7–7)
NEUTROPHILS NFR BLD AUTO: 64.4 % (ref 42.7–76)
NRBC BLD AUTO-RTO: 0 /100 WBC (ref 0–0.2)
OPIATES UR QL: NEGATIVE
OXYCODONE UR QL SCN: NEGATIVE
PA ADP PRP-ACNC: 196 PRU
PCP UR QL SCN: NEGATIVE
PLATELET # BLD AUTO: 179 10*3/MM3 (ref 140–450)
PMV BLD AUTO: 9.2 FL (ref 6–12)
POTASSIUM SERPL-SCNC: 4.6 MMOL/L (ref 3.5–5.2)
PROPOXYPH UR QL: NEGATIVE
PROT SERPL-MCNC: 6.6 G/DL (ref 6–8.5)
PROTHROMBIN TIME: 12.7 SECONDS (ref 11.5–14)
RBC # BLD AUTO: 5.08 10*6/MM3 (ref 4.14–5.8)
RH BLD: POSITIVE
SODIUM SERPL-SCNC: 144 MMOL/L (ref 136–145)
T&S EXPIRATION DATE: NORMAL
TRICYCLICS UR QL SCN: NEGATIVE
WBC # BLD AUTO: 9.05 10*3/MM3 (ref 3.4–10.8)

## 2021-03-01 PROCEDURE — 83036 HEMOGLOBIN GLYCOSYLATED A1C: CPT

## 2021-03-01 PROCEDURE — 85025 COMPLETE CBC W/AUTO DIFF WBC: CPT

## 2021-03-01 PROCEDURE — 71046 X-RAY EXAM CHEST 2 VIEWS: CPT

## 2021-03-01 PROCEDURE — 83735 ASSAY OF MAGNESIUM: CPT

## 2021-03-01 PROCEDURE — 85730 THROMBOPLASTIN TIME PARTIAL: CPT

## 2021-03-01 PROCEDURE — 85610 PROTHROMBIN TIME: CPT

## 2021-03-01 PROCEDURE — 85576 BLOOD PLATELET AGGREGATION: CPT

## 2021-03-01 PROCEDURE — 94010 BREATHING CAPACITY TEST: CPT | Performed by: INTERNAL MEDICINE

## 2021-03-01 PROCEDURE — 80053 COMPREHEN METABOLIC PANEL: CPT

## 2021-03-01 PROCEDURE — 86900 BLOOD TYPING SEROLOGIC ABO: CPT

## 2021-03-01 PROCEDURE — 94010 BREATHING CAPACITY TEST: CPT

## 2021-03-01 PROCEDURE — 36415 COLL VENOUS BLD VENIPUNCTURE: CPT

## 2021-03-01 PROCEDURE — 86923 COMPATIBILITY TEST ELECTRIC: CPT

## 2021-03-01 PROCEDURE — 86850 RBC ANTIBODY SCREEN: CPT

## 2021-03-01 PROCEDURE — 86901 BLOOD TYPING SEROLOGIC RH(D): CPT

## 2021-03-01 PROCEDURE — 80306 DRUG TEST PRSMV INSTRMNT: CPT

## 2021-03-01 RX ORDER — ASPIRIN 325 MG
325 TABLET ORAL NIGHTLY
Status: SHIPPED | OUTPATIENT
Start: 2021-03-01 | End: 2021-03-02

## 2021-03-01 RX ORDER — CHLORHEXIDINE GLUCONATE 500 MG/1
1 CLOTH TOPICAL EVERY 12 HOURS PRN
Status: DISCONTINUED | OUTPATIENT
Start: 2021-03-01 | End: 2021-03-08

## 2021-03-01 NOTE — PAT
Patient to apply Chlorhexadine wipes  to surgical area (as instructed) the night before procedure and the AM of procedure. Wipes provided.    Blood bank bracelet applied to patient during Pre Admission Testing visit.  Patient instructed not to remove from arm until after procedure and they are discharged from the hospital.  Explained to patient that they may shower and get the bracelet wet, but not to immerse under water for longer periods (bathing, swimming, hand dishwashing, etc).  Patient verbalized understanding.    Instructed patient to take 325 mg the night before heart surgery as per CT surgeon's order.  Patient and/or family verbalized understanding.    BACTROBAN APPLIED TO EACH NOSTRIL DURING PAT VISIT

## 2021-03-02 ENCOUNTER — ANESTHESIA EVENT (OUTPATIENT)
Dept: PERIOP | Facility: HOSPITAL | Age: 66
End: 2021-03-02

## 2021-03-02 RX ORDER — FAMOTIDINE 10 MG/ML
20 INJECTION, SOLUTION INTRAVENOUS ONCE
Status: CANCELLED | OUTPATIENT
Start: 2021-03-02 | End: 2021-03-02

## 2021-03-02 NOTE — ANESTHESIA PREPROCEDURE EVALUATION
Anesthesia Evaluation     Patient summary reviewed and Nursing notes reviewed   NPO Solid Status: > 8 hours  NPO Liquid Status: > 8 hours           Airway   Mallampati: I  TM distance: >3 FB  Neck ROM: full  No difficulty expected  Dental      Pulmonary     breath sounds clear to auscultation  Cardiovascular     ECG reviewed  Rhythm: regular  Rate: normal    (+) hypertension, valvular problems/murmurs AI, AS and MR, dysrhythmias Atrial Fib, CHF ,       Neuro/Psych  GI/Hepatic/Renal/Endo    (+) obesity,       Musculoskeletal     Abdominal    Substance History      OB/GYN          Other   arthritis,                    Anesthesia Plan    ASA 4     general   (EVA Velasco , DIONISIO)  intravenous induction     Anesthetic plan, all risks, benefits, and alternatives have been provided, discussed and informed consent has been obtained with: patient.

## 2021-03-03 ENCOUNTER — APPOINTMENT (OUTPATIENT)
Dept: GENERAL RADIOLOGY | Facility: HOSPITAL | Age: 66
End: 2021-03-03

## 2021-03-03 ENCOUNTER — ANESTHESIA (OUTPATIENT)
Dept: PERIOP | Facility: HOSPITAL | Age: 66
End: 2021-03-03

## 2021-03-03 ENCOUNTER — HOSPITAL ENCOUNTER (INPATIENT)
Facility: HOSPITAL | Age: 66
LOS: 6 days | Discharge: HOME | End: 2021-03-09
Attending: THORACIC SURGERY (CARDIOTHORACIC VASCULAR SURGERY) | Admitting: THORACIC SURGERY (CARDIOTHORACIC VASCULAR SURGERY)

## 2021-03-03 ENCOUNTER — ANCILLARY PROCEDURE (OUTPATIENT)
Dept: PERIOP | Facility: HOSPITAL | Age: 66
End: 2021-03-03

## 2021-03-03 DIAGNOSIS — I71.20 THORACIC AORTIC ANEURYSM WITHOUT RUPTURE (HCC): ICD-10-CM

## 2021-03-03 DIAGNOSIS — J95.811 POSTPROCEDURAL PNEUMOTHORAX: Primary | ICD-10-CM

## 2021-03-03 DIAGNOSIS — I42.8 NICM (NONISCHEMIC CARDIOMYOPATHY) (HCC): ICD-10-CM

## 2021-03-03 DIAGNOSIS — Q23.1 BICUSPID AORTIC VALVE: ICD-10-CM

## 2021-03-03 LAB
ABO GROUP BLD: NORMAL
ALBUMIN SERPL-MCNC: 4.1 G/DL (ref 3.5–5.2)
ALBUMIN SERPL-MCNC: 4.5 G/DL (ref 3.5–5.2)
ALBUMIN SERPL-MCNC: 4.8 G/DL (ref 3.5–5.2)
ANION GAP SERPL CALCULATED.3IONS-SCNC: 10 MMOL/L (ref 5–15)
APTT PPP: 36.1 SECONDS (ref 24–37)
ARTERIAL PATENCY WRIST A: ABNORMAL
ARTERIAL PATENCY WRIST A: ABNORMAL
ATMOSPHERIC PRESS: ABNORMAL MM[HG]
ATMOSPHERIC PRESS: ABNORMAL MM[HG]
BASE EXCESS BLDA CALC-SCNC: -1.2 MMOL/L (ref 0–2)
BASE EXCESS BLDA CALC-SCNC: 0.5 MMOL/L (ref 0–2)
BDY SITE: ABNORMAL
BDY SITE: ABNORMAL
BODY TEMPERATURE: 37 C
BODY TEMPERATURE: 37 C
BUN SERPL-MCNC: 21 MG/DL (ref 8–23)
BUN SERPL-MCNC: 22 MG/DL (ref 8–23)
BUN SERPL-MCNC: 24 MG/DL (ref 8–23)
BUN/CREAT SERPL: 13.3 (ref 7–25)
BUN/CREAT SERPL: 13.7 (ref 7–25)
BUN/CREAT SERPL: 17.5 (ref 7–25)
CA-I SERPL ISE-MCNC: 1.31 MMOL/L (ref 1.12–1.32)
CALCIUM SPEC-SCNC: 9.2 MG/DL (ref 8.6–10.5)
CALCIUM SPEC-SCNC: 9.4 MG/DL (ref 8.6–10.5)
CALCIUM SPEC-SCNC: 9.5 MG/DL (ref 8.6–10.5)
CHLORIDE SERPL-SCNC: 102 MMOL/L (ref 98–107)
CHLORIDE SERPL-SCNC: 104 MMOL/L (ref 98–107)
CHLORIDE SERPL-SCNC: 107 MMOL/L (ref 98–107)
CO2 BLDA-SCNC: 26.1 MMOL/L (ref 22–33)
CO2 BLDA-SCNC: 27.5 MMOL/L (ref 22–33)
CO2 SERPL-SCNC: 24 MMOL/L (ref 22–29)
CO2 SERPL-SCNC: 24 MMOL/L (ref 22–29)
CO2 SERPL-SCNC: 26 MMOL/L (ref 22–29)
COHGB MFR BLD: 0.9 % (ref 0–2)
COHGB MFR BLD: 1.3 % (ref 0–2)
CREAT SERPL-MCNC: 1.2 MG/DL (ref 0.76–1.27)
CREAT SERPL-MCNC: 1.61 MG/DL (ref 0.76–1.27)
CREAT SERPL-MCNC: 1.81 MG/DL (ref 0.76–1.27)
DEPRECATED RDW RBC AUTO: 47.2 FL (ref 37–54)
DEPRECATED RDW RBC AUTO: 47.5 FL (ref 37–54)
DEPRECATED RDW RBC AUTO: 47.5 FL (ref 37–54)
EPAP: 0
EPAP: 0
ERYTHROCYTE [DISTWIDTH] IN BLOOD BY AUTOMATED COUNT: 13.4 % (ref 12.3–15.4)
ERYTHROCYTE [DISTWIDTH] IN BLOOD BY AUTOMATED COUNT: 13.5 % (ref 12.3–15.4)
ERYTHROCYTE [DISTWIDTH] IN BLOOD BY AUTOMATED COUNT: 13.6 % (ref 12.3–15.4)
GFR SERPL CREATININE-BSD FRML MDRD: 38 ML/MIN/1.73
GFR SERPL CREATININE-BSD FRML MDRD: 43 ML/MIN/1.73
GFR SERPL CREATININE-BSD FRML MDRD: 61 ML/MIN/1.73
GLUCOSE BLDC GLUCOMTR-MCNC: 127 MG/DL (ref 70–130)
GLUCOSE BLDC GLUCOMTR-MCNC: 130 MG/DL (ref 70–130)
GLUCOSE BLDC GLUCOMTR-MCNC: 131 MG/DL (ref 70–130)
GLUCOSE BLDC GLUCOMTR-MCNC: 141 MG/DL (ref 70–130)
GLUCOSE BLDC GLUCOMTR-MCNC: 143 MG/DL (ref 70–130)
GLUCOSE BLDC GLUCOMTR-MCNC: 144 MG/DL (ref 70–130)
GLUCOSE BLDC GLUCOMTR-MCNC: 148 MG/DL (ref 70–130)
GLUCOSE BLDC GLUCOMTR-MCNC: 153 MG/DL (ref 70–130)
GLUCOSE BLDC GLUCOMTR-MCNC: 155 MG/DL (ref 70–130)
GLUCOSE SERPL-MCNC: 131 MG/DL (ref 65–99)
GLUCOSE SERPL-MCNC: 177 MG/DL (ref 65–99)
GLUCOSE SERPL-MCNC: 189 MG/DL (ref 65–99)
HCO3 BLDA-SCNC: 24.7 MMOL/L (ref 20–26)
HCO3 BLDA-SCNC: 26.1 MMOL/L (ref 20–26)
HCT VFR BLD AUTO: 32 % (ref 37.5–51)
HCT VFR BLD AUTO: 33.3 % (ref 37.5–51)
HCT VFR BLD AUTO: 35.3 % (ref 37.5–51)
HCT VFR BLD CALC: 32.6 %
HCT VFR BLD CALC: 35.3 %
HGB BLD-MCNC: 10.2 G/DL (ref 13–17.7)
HGB BLD-MCNC: 10.6 G/DL (ref 13–17.7)
HGB BLD-MCNC: 11.5 G/DL (ref 13–17.7)
HGB BLDA-MCNC: 10.6 G/DL (ref 13.5–17.5)
HGB BLDA-MCNC: 11.5 G/DL (ref 13.5–17.5)
INHALED O2 CONCENTRATION: 100 %
INHALED O2 CONCENTRATION: 35 %
INR PPP: 1.01 (ref 0.85–1.16)
IPAP: 0
IPAP: 0
MAGNESIUM SERPL-MCNC: 1.7 MG/DL (ref 1.6–2.4)
MAGNESIUM SERPL-MCNC: 1.8 MG/DL (ref 1.6–2.4)
MAGNESIUM SERPL-MCNC: 2.2 MG/DL (ref 1.6–2.4)
MCH RBC QN AUTO: 30.4 PG (ref 26.6–33)
MCH RBC QN AUTO: 30.6 PG (ref 26.6–33)
MCH RBC QN AUTO: 31.3 PG (ref 26.6–33)
MCHC RBC AUTO-ENTMCNC: 31.8 G/DL (ref 31.5–35.7)
MCHC RBC AUTO-ENTMCNC: 31.9 G/DL (ref 31.5–35.7)
MCHC RBC AUTO-ENTMCNC: 32.6 G/DL (ref 31.5–35.7)
MCV RBC AUTO: 95.2 FL (ref 79–97)
MCV RBC AUTO: 95.9 FL (ref 79–97)
MCV RBC AUTO: 96.2 FL (ref 79–97)
METHGB BLD QL: 0.5 % (ref 0–1.5)
METHGB BLD QL: 0.7 % (ref 0–1.5)
MODALITY: ABNORMAL
MODALITY: ABNORMAL
NOTE: ABNORMAL
NOTE: ABNORMAL
OXYHGB MFR BLDV: 95.2 % (ref 94–99)
OXYHGB MFR BLDV: 98.7 % (ref 94–99)
PAW @ PEAK INSP FLOW SETTING VENT: 0 CMH2O
PAW @ PEAK INSP FLOW SETTING VENT: 0 CMH2O
PCO2 BLDA: 45.5 MM HG (ref 35–45)
PCO2 BLDA: 45.6 MM HG (ref 35–45)
PCO2 TEMP ADJ BLD: 45.5 MM HG (ref 35–48)
PCO2 TEMP ADJ BLD: 45.6 MM HG (ref 35–48)
PH BLDA: 7.34 PH UNITS (ref 7.35–7.45)
PH BLDA: 7.37 PH UNITS (ref 7.35–7.45)
PH, TEMP CORRECTED: 7.34 PH UNITS
PH, TEMP CORRECTED: 7.37 PH UNITS
PHOSPHATE SERPL-MCNC: 2.9 MG/DL (ref 2.5–4.5)
PHOSPHATE SERPL-MCNC: 3.6 MG/DL (ref 2.5–4.5)
PHOSPHATE SERPL-MCNC: 4.2 MG/DL (ref 2.5–4.5)
PLATELET # BLD AUTO: 174 10*3/MM3 (ref 140–450)
PLATELET # BLD AUTO: 195 10*3/MM3 (ref 140–450)
PLATELET # BLD AUTO: 220 10*3/MM3 (ref 140–450)
PMV BLD AUTO: 10.2 FL (ref 6–12)
PMV BLD AUTO: 10.2 FL (ref 6–12)
PMV BLD AUTO: 9.8 FL (ref 6–12)
PO2 BLDA: 451 MM HG (ref 83–108)
PO2 BLDA: 82.5 MM HG (ref 83–108)
PO2 TEMP ADJ BLD: 451 MM HG (ref 83–108)
PO2 TEMP ADJ BLD: 82.5 MM HG (ref 83–108)
POTASSIUM SERPL-SCNC: 4.2 MMOL/L (ref 3.5–5.2)
POTASSIUM SERPL-SCNC: 4.3 MMOL/L (ref 3.5–5.2)
POTASSIUM SERPL-SCNC: 5.5 MMOL/L (ref 3.5–5.2)
PROTHROMBIN TIME: 13 SECONDS (ref 11.5–14)
QT INTERVAL: 482 MS
QTC INTERVAL: 555 MS
RBC # BLD AUTO: 3.36 10*6/MM3 (ref 4.14–5.8)
RBC # BLD AUTO: 3.46 10*6/MM3 (ref 4.14–5.8)
RBC # BLD AUTO: 3.68 10*6/MM3 (ref 4.14–5.8)
RH BLD: POSITIVE
SODIUM SERPL-SCNC: 138 MMOL/L (ref 136–145)
SODIUM SERPL-SCNC: 138 MMOL/L (ref 136–145)
SODIUM SERPL-SCNC: 141 MMOL/L (ref 136–145)
TOTAL RATE: 0 BREATHS/MINUTE
TOTAL RATE: 0 BREATHS/MINUTE
WBC # BLD AUTO: 10.94 10*3/MM3 (ref 3.4–10.8)
WBC # BLD AUTO: 13.91 10*3/MM3 (ref 3.4–10.8)
WBC # BLD AUTO: 14.47 10*3/MM3 (ref 3.4–10.8)

## 2021-03-03 PROCEDURE — 33405 REPLACEMENT AORTIC VALVE OPN: CPT | Performed by: THORACIC SURGERY (CARDIOTHORACIC VASCULAR SURGERY)

## 2021-03-03 PROCEDURE — 25010000002 PROTAMINE SULFATE PER 10 MG: Performed by: PHYSICIAN ASSISTANT

## 2021-03-03 PROCEDURE — 88311 DECALCIFY TISSUE: CPT | Performed by: THORACIC SURGERY (CARDIOTHORACIC VASCULAR SURGERY)

## 2021-03-03 PROCEDURE — 99233 SBSQ HOSP IP/OBS HIGH 50: CPT | Performed by: INTERNAL MEDICINE

## 2021-03-03 PROCEDURE — 25010000002 EPINEPHRINE 1 MG/ML SOLUTION 30 ML VIAL: Performed by: ANESTHESIOLOGY

## 2021-03-03 PROCEDURE — S0260 H&P FOR SURGERY: HCPCS | Performed by: THORACIC SURGERY (CARDIOTHORACIC VASCULAR SURGERY)

## 2021-03-03 PROCEDURE — 02VX0DZ RESTRICTION OF THORACIC AORTA, ASCENDING/ARCH WITH INTRALUMINAL DEVICE, OPEN APPROACH: ICD-10-PCS | Performed by: THORACIC SURGERY (CARDIOTHORACIC VASCULAR SURGERY)

## 2021-03-03 PROCEDURE — C1729 CATH, DRAINAGE: HCPCS | Performed by: THORACIC SURGERY (CARDIOTHORACIC VASCULAR SURGERY)

## 2021-03-03 PROCEDURE — 86900 BLOOD TYPING SEROLOGIC ABO: CPT

## 2021-03-03 PROCEDURE — 82805 BLOOD GASES W/O2 SATURATION: CPT

## 2021-03-03 PROCEDURE — 25010000003 CEFUROXIME SODIUM 1.5 G RECONSTITUTED SOLUTION: Performed by: PHYSICIAN ASSISTANT

## 2021-03-03 PROCEDURE — 85347 COAGULATION TIME ACTIVATED: CPT

## 2021-03-03 PROCEDURE — 94799 UNLISTED PULMONARY SVC/PX: CPT

## 2021-03-03 PROCEDURE — 85014 HEMATOCRIT: CPT

## 2021-03-03 PROCEDURE — C1760 CLOSURE DEV, VASC: HCPCS | Performed by: THORACIC SURGERY (CARDIOTHORACIC VASCULAR SURGERY)

## 2021-03-03 PROCEDURE — 36430 TRANSFUSION BLD/BLD COMPNT: CPT

## 2021-03-03 PROCEDURE — 99253 IP/OBS CNSLTJ NEW/EST LOW 45: CPT | Performed by: PHYSICIAN ASSISTANT

## 2021-03-03 PROCEDURE — C1894 INTRO/SHEATH, NON-LASER: HCPCS | Performed by: THORACIC SURGERY (CARDIOTHORACIC VASCULAR SURGERY)

## 2021-03-03 PROCEDURE — 5A1221Z PERFORMANCE OF CARDIAC OUTPUT, CONTINUOUS: ICD-10-PCS | Performed by: THORACIC SURGERY (CARDIOTHORACIC VASCULAR SURGERY)

## 2021-03-03 PROCEDURE — P9041 ALBUMIN (HUMAN),5%, 50ML: HCPCS | Performed by: THORACIC SURGERY (CARDIOTHORACIC VASCULAR SURGERY)

## 2021-03-03 PROCEDURE — 82330 ASSAY OF CALCIUM: CPT | Performed by: PHYSICIAN ASSISTANT

## 2021-03-03 PROCEDURE — 80069 RENAL FUNCTION PANEL: CPT | Performed by: PHYSICIAN ASSISTANT

## 2021-03-03 PROCEDURE — 33257 ABLATE ATRIA LMTD ADD-ON: CPT | Performed by: THORACIC SURGERY (CARDIOTHORACIC VASCULAR SURGERY)

## 2021-03-03 PROCEDURE — C1713 ANCHOR/SCREW BN/BN,TIS/BN: HCPCS | Performed by: THORACIC SURGERY (CARDIOTHORACIC VASCULAR SURGERY)

## 2021-03-03 PROCEDURE — 82947 ASSAY GLUCOSE BLOOD QUANT: CPT

## 2021-03-03 PROCEDURE — 82375 ASSAY CARBOXYHB QUANT: CPT

## 2021-03-03 PROCEDURE — 25010000002 MIDAZOLAM PER 1 MG: Performed by: ANESTHESIOLOGY

## 2021-03-03 PROCEDURE — P9041 ALBUMIN (HUMAN),5%, 50ML: HCPCS | Performed by: PHYSICIAN ASSISTANT

## 2021-03-03 PROCEDURE — 25010000002 ANTI-INHIBITOR COAGULANT COMPLEX: Performed by: THORACIC SURGERY (CARDIOTHORACIC VASCULAR SURGERY)

## 2021-03-03 PROCEDURE — 71045 X-RAY EXAM CHEST 1 VIEW: CPT

## 2021-03-03 PROCEDURE — 25010000002 ALBUMIN HUMAN 5% PER 50 ML: Performed by: PHYSICIAN ASSISTANT

## 2021-03-03 PROCEDURE — 25010000002 HEPARIN (PORCINE) PER 1000 UNITS: Performed by: THORACIC SURGERY (CARDIOTHORACIC VASCULAR SURGERY)

## 2021-03-03 PROCEDURE — 25010000002 FUROSEMIDE PER 20 MG: Performed by: THORACIC SURGERY (CARDIOTHORACIC VASCULAR SURGERY)

## 2021-03-03 PROCEDURE — 25810000003 DEXTROSE 5 % WITH KCL 20 MEQ 20-5 MEQ/L-% SOLUTION: Performed by: PHYSICIAN ASSISTANT

## 2021-03-03 PROCEDURE — 25010000002 MORPHINE PER 10 MG: Performed by: THORACIC SURGERY (CARDIOTHORACIC VASCULAR SURGERY)

## 2021-03-03 PROCEDURE — 33859 AS-AORT GRF F/DS OTH/THN DSJ: CPT | Performed by: THORACIC SURGERY (CARDIOTHORACIC VASCULAR SURGERY)

## 2021-03-03 PROCEDURE — 84295 ASSAY OF SERUM SODIUM: CPT

## 2021-03-03 PROCEDURE — 25010000002 PROPOFOL 10 MG/ML EMULSION: Performed by: THORACIC SURGERY (CARDIOTHORACIC VASCULAR SURGERY)

## 2021-03-03 PROCEDURE — 93318 ECHO TRANSESOPHAGEAL INTRAOP: CPT | Performed by: ANESTHESIOLOGY

## 2021-03-03 PROCEDURE — 93318 ECHO TRANSESOPHAGEAL INTRAOP: CPT

## 2021-03-03 PROCEDURE — 86901 BLOOD TYPING SEROLOGIC RH(D): CPT

## 2021-03-03 PROCEDURE — 02RF08Z REPLACEMENT OF AORTIC VALVE WITH ZOOPLASTIC TISSUE, OPEN APPROACH: ICD-10-PCS | Performed by: THORACIC SURGERY (CARDIOTHORACIC VASCULAR SURGERY)

## 2021-03-03 PROCEDURE — 85027 COMPLETE CBC AUTOMATED: CPT | Performed by: THORACIC SURGERY (CARDIOTHORACIC VASCULAR SURGERY)

## 2021-03-03 PROCEDURE — C1768 GRAFT, VASCULAR: HCPCS | Performed by: THORACIC SURGERY (CARDIOTHORACIC VASCULAR SURGERY)

## 2021-03-03 PROCEDURE — 85610 PROTHROMBIN TIME: CPT | Performed by: PHYSICIAN ASSISTANT

## 2021-03-03 PROCEDURE — 25010000002 HEPARIN (PORCINE) PER 1000 UNITS: Performed by: ANESTHESIOLOGY

## 2021-03-03 PROCEDURE — 84132 ASSAY OF SERUM POTASSIUM: CPT

## 2021-03-03 PROCEDURE — 85027 COMPLETE CBC AUTOMATED: CPT | Performed by: PHYSICIAN ASSISTANT

## 2021-03-03 PROCEDURE — 83050 HGB METHEMOGLOBIN QUAN: CPT

## 2021-03-03 PROCEDURE — 85730 THROMBOPLASTIN TIME PARTIAL: CPT | Performed by: PHYSICIAN ASSISTANT

## 2021-03-03 PROCEDURE — 25010000002 PROPOFOL 10 MG/ML EMULSION: Performed by: ANESTHESIOLOGY

## 2021-03-03 PROCEDURE — 82962 GLUCOSE BLOOD TEST: CPT

## 2021-03-03 PROCEDURE — P9037 PLATE PHERES LEUKOREDU IRRAD: HCPCS

## 2021-03-03 PROCEDURE — 88305 TISSUE EXAM BY PATHOLOGIST: CPT | Performed by: THORACIC SURGERY (CARDIOTHORACIC VASCULAR SURGERY)

## 2021-03-03 PROCEDURE — 94002 VENT MGMT INPAT INIT DAY: CPT

## 2021-03-03 PROCEDURE — 25010000002 ALBUMIN HUMAN 5% PER 50 ML: Performed by: THORACIC SURGERY (CARDIOTHORACIC VASCULAR SURGERY)

## 2021-03-03 PROCEDURE — 82330 ASSAY OF CALCIUM: CPT

## 2021-03-03 PROCEDURE — 83735 ASSAY OF MAGNESIUM: CPT | Performed by: THORACIC SURGERY (CARDIOTHORACIC VASCULAR SURGERY)

## 2021-03-03 PROCEDURE — 25010000002 CEFUROXIME: Performed by: PHYSICIAN ASSISTANT

## 2021-03-03 PROCEDURE — 93005 ELECTROCARDIOGRAM TRACING: CPT | Performed by: PHYSICIAN ASSISTANT

## 2021-03-03 PROCEDURE — P9035 PLATELET PHERES LEUKOREDUCED: HCPCS

## 2021-03-03 PROCEDURE — 82803 BLOOD GASES ANY COMBINATION: CPT

## 2021-03-03 PROCEDURE — C1751 CATH, INF, PER/CENT/MIDLINE: HCPCS | Performed by: ANESTHESIOLOGY

## 2021-03-03 PROCEDURE — 80069 RENAL FUNCTION PANEL: CPT | Performed by: THORACIC SURGERY (CARDIOTHORACIC VASCULAR SURGERY)

## 2021-03-03 PROCEDURE — 25010000002 EPINEPHRINE 30 MG/30ML SOLUTION 30 ML VIAL: Performed by: PHYSICIAN ASSISTANT

## 2021-03-03 PROCEDURE — B24BZZ4 ULTRASONOGRAPHY OF HEART WITH AORTA, TRANSESOPHAGEAL: ICD-10-PCS | Performed by: ANESTHESIOLOGY

## 2021-03-03 PROCEDURE — 83735 ASSAY OF MAGNESIUM: CPT | Performed by: PHYSICIAN ASSISTANT

## 2021-03-03 PROCEDURE — 02580ZZ DESTRUCTION OF CONDUCTION MECHANISM, OPEN APPROACH: ICD-10-PCS | Performed by: THORACIC SURGERY (CARDIOTHORACIC VASCULAR SURGERY)

## 2021-03-03 PROCEDURE — 25010000002 VANCOMYCIN 1 G RECONSTITUTED SOLUTION 1 EACH VIAL: Performed by: THORACIC SURGERY (CARDIOTHORACIC VASCULAR SURGERY)

## 2021-03-03 DEVICE — IMPLANTABLE DEVICE: Type: IMPLANTABLE DEVICE | Site: HEART | Status: FUNCTIONAL

## 2021-03-03 DEVICE — WR SUT NONABS MF SS V40 1/2CIR TC 6/0 18IN M649G: Type: IMPLANTABLE DEVICE | Site: HEART | Status: FUNCTIONAL

## 2021-03-03 DEVICE — ADHS SURG BIOGLUE PREF STD/TP 10ML: Type: IMPLANTABLE DEVICE | Site: HEART | Status: FUNCTIONAL

## 2021-03-03 RX ORDER — ROCURONIUM BROMIDE 10 MG/ML
INJECTION, SOLUTION INTRAVENOUS AS NEEDED
Status: DISCONTINUED | OUTPATIENT
Start: 2021-03-03 | End: 2021-03-03 | Stop reason: SURG

## 2021-03-03 RX ORDER — MORPHINE SULFATE 2 MG/ML
2 INJECTION, SOLUTION INTRAMUSCULAR; INTRAVENOUS
Status: DISCONTINUED | OUTPATIENT
Start: 2021-03-03 | End: 2021-03-08

## 2021-03-03 RX ORDER — NITROGLYCERIN 0.4 MG/1
0.4 TABLET SUBLINGUAL
Status: DISCONTINUED | OUTPATIENT
Start: 2021-03-03 | End: 2021-03-03 | Stop reason: HOSPADM

## 2021-03-03 RX ORDER — ALBUTEROL SULFATE 2.5 MG/3ML
2.5 SOLUTION RESPIRATORY (INHALATION) EVERY 4 HOURS PRN
Status: DISCONTINUED | OUTPATIENT
Start: 2021-03-03 | End: 2021-03-03 | Stop reason: SDUPTHER

## 2021-03-03 RX ORDER — PROPOFOL 10 MG/ML
VIAL (ML) INTRAVENOUS CONTINUOUS PRN
Status: DISCONTINUED | OUTPATIENT
Start: 2021-03-03 | End: 2021-03-03 | Stop reason: SURG

## 2021-03-03 RX ORDER — ASPIRIN 81 MG/1
324 TABLET, CHEWABLE ORAL ONCE
COMMUNITY
End: 2021-03-09 | Stop reason: HOSPADM

## 2021-03-03 RX ORDER — ASPIRIN 325 MG
325 TABLET, DELAYED RELEASE (ENTERIC COATED) ORAL DAILY
Status: DISCONTINUED | OUTPATIENT
Start: 2021-03-04 | End: 2021-03-08

## 2021-03-03 RX ORDER — CHLORHEXIDINE GLUCONATE 0.12 MG/ML
15 RINSE ORAL EVERY 12 HOURS SCHEDULED
Status: DISCONTINUED | OUTPATIENT
Start: 2021-03-03 | End: 2021-03-03 | Stop reason: SDUPTHER

## 2021-03-03 RX ORDER — DEXMEDETOMIDINE HYDROCHLORIDE 4 UG/ML
.2-1.5 INJECTION, SOLUTION INTRAVENOUS CONTINUOUS PRN
Status: DISCONTINUED | OUTPATIENT
Start: 2021-03-03 | End: 2021-03-04

## 2021-03-03 RX ORDER — ETOMIDATE 2 MG/ML
INJECTION INTRAVENOUS AS NEEDED
Status: DISCONTINUED | OUTPATIENT
Start: 2021-03-03 | End: 2021-03-03 | Stop reason: SURG

## 2021-03-03 RX ORDER — FENTANYL CITRATE 50 UG/ML
25 INJECTION, SOLUTION INTRAMUSCULAR; INTRAVENOUS
Status: DISCONTINUED | OUTPATIENT
Start: 2021-03-03 | End: 2021-03-04

## 2021-03-03 RX ORDER — DOBUTAMINE HYDROCHLORIDE 100 MG/100ML
2-20 INJECTION INTRAVENOUS CONTINUOUS PRN
Status: DISCONTINUED | OUTPATIENT
Start: 2021-03-03 | End: 2021-03-03

## 2021-03-03 RX ORDER — ALBUTEROL SULFATE 2.5 MG/3ML
2.5 SOLUTION RESPIRATORY (INHALATION) EVERY 4 HOURS PRN
Status: ACTIVE | OUTPATIENT
Start: 2021-03-03 | End: 2021-03-04

## 2021-03-03 RX ORDER — OXYCODONE HYDROCHLORIDE AND ACETAMINOPHEN 5; 325 MG/1; MG/1
2 TABLET ORAL EVERY 4 HOURS PRN
Status: DISCONTINUED | OUTPATIENT
Start: 2021-03-03 | End: 2021-03-08

## 2021-03-03 RX ORDER — PROTAMINE SULFATE 10 MG/ML
50 INJECTION, SOLUTION INTRAVENOUS ONCE
Status: COMPLETED | OUTPATIENT
Start: 2021-03-03 | End: 2021-03-03

## 2021-03-03 RX ORDER — SODIUM CHLORIDE 9 MG/ML
INJECTION, SOLUTION INTRAVENOUS AS NEEDED
Status: DISCONTINUED | OUTPATIENT
Start: 2021-03-03 | End: 2021-03-03 | Stop reason: HOSPADM

## 2021-03-03 RX ORDER — POTASSIUM CHLORIDE 750 MG/1
40 CAPSULE, EXTENDED RELEASE ORAL AS NEEDED
Status: DISCONTINUED | OUTPATIENT
Start: 2021-03-03 | End: 2021-03-09 | Stop reason: HOSPADM

## 2021-03-03 RX ORDER — ACETAMINOPHEN 325 MG/1
650 TABLET ORAL EVERY 4 HOURS PRN
Status: DISCONTINUED | OUTPATIENT
Start: 2021-03-03 | End: 2021-03-03 | Stop reason: HOSPADM

## 2021-03-03 RX ORDER — DOPAMINE HYDROCHLORIDE 160 MG/100ML
2-20 INJECTION, SOLUTION INTRAVENOUS CONTINUOUS PRN
Status: DISCONTINUED | OUTPATIENT
Start: 2021-03-03 | End: 2021-03-04

## 2021-03-03 RX ORDER — POTASSIUM CHLORIDE 1.5 G/1.77G
40 POWDER, FOR SOLUTION ORAL AS NEEDED
Status: DISCONTINUED | OUTPATIENT
Start: 2021-03-03 | End: 2021-03-09 | Stop reason: HOSPADM

## 2021-03-03 RX ORDER — SUFENTANIL CITRATE 50 UG/ML
INJECTION EPIDURAL; INTRAVENOUS AS NEEDED
Status: DISCONTINUED | OUTPATIENT
Start: 2021-03-03 | End: 2021-03-03 | Stop reason: SURG

## 2021-03-03 RX ORDER — HYDROCODONE BITARTRATE AND ACETAMINOPHEN 7.5; 325 MG/1; MG/1
1 TABLET ORAL EVERY 4 HOURS PRN
Status: DISCONTINUED | OUTPATIENT
Start: 2021-03-03 | End: 2021-03-08

## 2021-03-03 RX ORDER — POTASSIUM CHLORIDE 29.8 MG/ML
20 INJECTION INTRAVENOUS
Status: DISCONTINUED | OUTPATIENT
Start: 2021-03-03 | End: 2021-03-08

## 2021-03-03 RX ORDER — CHLORHEXIDINE GLUCONATE 500 MG/1
1 CLOTH TOPICAL EVERY 12 HOURS PRN
Status: DISCONTINUED | OUTPATIENT
Start: 2021-03-03 | End: 2021-03-03 | Stop reason: HOSPADM

## 2021-03-03 RX ORDER — VECURONIUM BROMIDE 1 MG/ML
INJECTION, POWDER, LYOPHILIZED, FOR SOLUTION INTRAVENOUS AS NEEDED
Status: DISCONTINUED | OUTPATIENT
Start: 2021-03-03 | End: 2021-03-03 | Stop reason: SURG

## 2021-03-03 RX ORDER — POTASSIUM CHLORIDE, DEXTROSE MONOHYDRATE 150; 5 MG/100ML; G/100ML
30 INJECTION, SOLUTION INTRAVENOUS CONTINUOUS
Status: DISCONTINUED | OUTPATIENT
Start: 2021-03-03 | End: 2021-03-04

## 2021-03-03 RX ORDER — HEPARIN SODIUM 1000 [USP'U]/ML
INJECTION, SOLUTION INTRAVENOUS; SUBCUTANEOUS AS NEEDED
Status: DISCONTINUED | OUTPATIENT
Start: 2021-03-03 | End: 2021-03-03 | Stop reason: SURG

## 2021-03-03 RX ORDER — NOREPINEPHRINE BIT/0.9 % NACL 8 MG/250ML
.02-.3 INFUSION BOTTLE (ML) INTRAVENOUS CONTINUOUS PRN
Status: DISCONTINUED | OUTPATIENT
Start: 2021-03-03 | End: 2021-03-08

## 2021-03-03 RX ORDER — PHENYLEPHRINE HCL IN 0.9% NACL 0.5 MG/5ML
.5-3 SYRINGE (ML) INTRAVENOUS CONTINUOUS PRN
Status: DISCONTINUED | OUTPATIENT
Start: 2021-03-03 | End: 2021-03-04

## 2021-03-03 RX ORDER — ALBUMIN, HUMAN INJ 5% 5 %
500 SOLUTION INTRAVENOUS ONCE
Status: COMPLETED | OUTPATIENT
Start: 2021-03-03 | End: 2021-03-03

## 2021-03-03 RX ORDER — SODIUM CHLORIDE 0.9 % (FLUSH) 0.9 %
10 SYRINGE (ML) INJECTION AS NEEDED
Status: DISCONTINUED | OUTPATIENT
Start: 2021-03-03 | End: 2021-03-03 | Stop reason: HOSPADM

## 2021-03-03 RX ORDER — FAMOTIDINE 20 MG/1
20 TABLET, FILM COATED ORAL ONCE
Status: COMPLETED | OUTPATIENT
Start: 2021-03-03 | End: 2021-03-03

## 2021-03-03 RX ORDER — MIDAZOLAM HYDROCHLORIDE 1 MG/ML
1 INJECTION INTRAMUSCULAR; INTRAVENOUS
Status: COMPLETED | OUTPATIENT
Start: 2021-03-03 | End: 2021-03-03

## 2021-03-03 RX ORDER — ONDANSETRON 2 MG/ML
4 INJECTION INTRAMUSCULAR; INTRAVENOUS EVERY 6 HOURS PRN
Status: DISCONTINUED | OUTPATIENT
Start: 2021-03-03 | End: 2021-03-09 | Stop reason: HOSPADM

## 2021-03-03 RX ORDER — GLYCOPYRROLATE 0.2 MG/ML
INJECTION INTRAMUSCULAR; INTRAVENOUS AS NEEDED
Status: DISCONTINUED | OUTPATIENT
Start: 2021-03-03 | End: 2021-03-03 | Stop reason: SURG

## 2021-03-03 RX ORDER — NITROGLYCERIN 20 MG/100ML
5-200 INJECTION INTRAVENOUS CONTINUOUS PRN
Status: DISCONTINUED | OUTPATIENT
Start: 2021-03-03 | End: 2021-03-08

## 2021-03-03 RX ORDER — CHLORHEXIDINE GLUCONATE 0.12 MG/ML
15 RINSE ORAL ONCE
Status: COMPLETED | OUTPATIENT
Start: 2021-03-03 | End: 2021-03-03

## 2021-03-03 RX ORDER — ATORVASTATIN CALCIUM 40 MG/1
40 TABLET, FILM COATED ORAL NIGHTLY
Status: DISCONTINUED | OUTPATIENT
Start: 2021-03-03 | End: 2021-03-03

## 2021-03-03 RX ORDER — ASPIRIN 325 MG
325 TABLET ORAL ONCE
Status: COMPLETED | OUTPATIENT
Start: 2021-03-03 | End: 2021-03-03

## 2021-03-03 RX ORDER — ALBUMIN, HUMAN INJ 5% 5 %
500 SOLUTION INTRAVENOUS AS NEEDED
Status: COMPLETED | OUTPATIENT
Start: 2021-03-03 | End: 2021-03-03

## 2021-03-03 RX ORDER — SODIUM CHLORIDE 0.9 % (FLUSH) 0.9 %
10 SYRINGE (ML) INJECTION EVERY 12 HOURS SCHEDULED
Status: DISCONTINUED | OUTPATIENT
Start: 2021-03-03 | End: 2021-03-03 | Stop reason: HOSPADM

## 2021-03-03 RX ORDER — SODIUM CHLORIDE, SODIUM LACTATE, POTASSIUM CHLORIDE, CALCIUM CHLORIDE 600; 310; 30; 20 MG/100ML; MG/100ML; MG/100ML; MG/100ML
9 INJECTION, SOLUTION INTRAVENOUS CONTINUOUS
Status: DISCONTINUED | OUTPATIENT
Start: 2021-03-03 | End: 2021-03-03

## 2021-03-03 RX ORDER — MIDAZOLAM HYDROCHLORIDE 1 MG/ML
2 INJECTION INTRAMUSCULAR; INTRAVENOUS
Status: COMPLETED | OUTPATIENT
Start: 2021-03-03 | End: 2021-03-03

## 2021-03-03 RX ORDER — METOPROLOL TARTRATE 5 MG/5ML
2.5 INJECTION INTRAVENOUS EVERY 6 HOURS SCHEDULED
Status: DISCONTINUED | OUTPATIENT
Start: 2021-03-03 | End: 2021-03-04

## 2021-03-03 RX ORDER — ATORVASTATIN CALCIUM 40 MG/1
80 TABLET, FILM COATED ORAL NIGHTLY
Status: DISCONTINUED | OUTPATIENT
Start: 2021-03-03 | End: 2021-03-09 | Stop reason: HOSPADM

## 2021-03-03 RX ORDER — NALOXONE HCL 0.4 MG/ML
0.4 VIAL (ML) INJECTION
Status: DISCONTINUED | OUTPATIENT
Start: 2021-03-03 | End: 2021-03-04

## 2021-03-03 RX ORDER — LIDOCAINE HYDROCHLORIDE 10 MG/ML
0.5 INJECTION, SOLUTION EPIDURAL; INFILTRATION; INTRACAUDAL; PERINEURAL ONCE AS NEEDED
Status: COMPLETED | OUTPATIENT
Start: 2021-03-03 | End: 2021-03-03

## 2021-03-03 RX ORDER — MEPERIDINE HYDROCHLORIDE 25 MG/ML
25 INJECTION INTRAMUSCULAR; INTRAVENOUS; SUBCUTANEOUS EVERY 4 HOURS PRN
Status: DISCONTINUED | OUTPATIENT
Start: 2021-03-03 | End: 2021-03-04

## 2021-03-03 RX ORDER — FUROSEMIDE 10 MG/ML
40 INJECTION INTRAMUSCULAR; INTRAVENOUS ONCE
Status: COMPLETED | OUTPATIENT
Start: 2021-03-03 | End: 2021-03-03

## 2021-03-03 RX ORDER — SODIUM CHLORIDE 0.9 % (FLUSH) 0.9 %
30 SYRINGE (ML) INJECTION ONCE AS NEEDED
Status: DISCONTINUED | OUTPATIENT
Start: 2021-03-03 | End: 2021-03-03

## 2021-03-03 RX ORDER — SODIUM CHLORIDE 9 MG/ML
30 INJECTION, SOLUTION INTRAVENOUS CONTINUOUS PRN
Status: DISCONTINUED | OUTPATIENT
Start: 2021-03-03 | End: 2021-03-03

## 2021-03-03 RX ORDER — CHLORHEXIDINE GLUCONATE 0.12 MG/ML
15 RINSE ORAL EVERY 12 HOURS SCHEDULED
Status: DISCONTINUED | OUTPATIENT
Start: 2021-03-03 | End: 2021-03-04

## 2021-03-03 RX ADMIN — PROPOFOL 50 MCG/KG/MIN: 10 INJECTION, EMULSION INTRAVENOUS at 13:55

## 2021-03-03 RX ADMIN — CHLORHEXIDINE GLUCONATE 15 ML: 1.2 SOLUTION ORAL at 20:26

## 2021-03-03 RX ADMIN — MUPIROCIN 1 APPLICATION: 20 OINTMENT TOPICAL at 06:18

## 2021-03-03 RX ADMIN — ALBUMIN HUMAN 500 ML: 0.05 INJECTION, SOLUTION INTRAVENOUS at 14:46

## 2021-03-03 RX ADMIN — DEXMEDETOMIDINE HYDROCHLORIDE 0.2 MCG/KG/HR: 400 INJECTION, SOLUTION INTRAVENOUS at 14:46

## 2021-03-03 RX ADMIN — SUFENTANIL CITRATE 50 MCG: 50 INJECTION, SOLUTION EPIDURAL; INTRAVENOUS at 10:26

## 2021-03-03 RX ADMIN — LIDOCAINE HYDROCHLORIDE 0.5 ML: 10 INJECTION, SOLUTION EPIDURAL; INFILTRATION; INTRACAUDAL; PERINEURAL at 06:18

## 2021-03-03 RX ADMIN — MIDAZOLAM 3 MG: 1 INJECTION INTRAMUSCULAR; INTRAVENOUS at 07:06

## 2021-03-03 RX ADMIN — CEFUROXIME SODIUM 1.5 G: 1.5 INJECTION, POWDER, FOR SOLUTION INTRAVENOUS at 23:52

## 2021-03-03 RX ADMIN — ALBUMIN HUMAN 500 ML: 0.05 INJECTION, SOLUTION INTRAVENOUS at 12:36

## 2021-03-03 RX ADMIN — INSULIN HUMAN 2 UNITS/HR: 1 INJECTION, SOLUTION INTRAVENOUS at 14:20

## 2021-03-03 RX ADMIN — SODIUM CHLORIDE, POTASSIUM CHLORIDE, SODIUM LACTATE AND CALCIUM CHLORIDE: 600; 310; 30; 20 INJECTION, SOLUTION INTRAVENOUS at 07:06

## 2021-03-03 RX ADMIN — CEFUROXIME 1.5 G: 1.5 INJECTION, POWDER, FOR SOLUTION INTRAVENOUS at 07:32

## 2021-03-03 RX ADMIN — ROCURONIUM BROMIDE 100 MG: 10 INJECTION INTRAVENOUS at 07:06

## 2021-03-03 RX ADMIN — CHLORHEXIDINE GLUCONATE 0.12% ORAL RINSE 15 ML: 1.2 LIQUID ORAL at 06:17

## 2021-03-03 RX ADMIN — ASPIRIN 325 MG ORAL TABLET 325 MG: 325 PILL ORAL at 12:54

## 2021-03-03 RX ADMIN — EPINEPHRINE 0.05 MCG/KG/MIN: 1 INJECTION INTRAMUSCULAR; INTRAVENOUS; SUBCUTANEOUS at 12:03

## 2021-03-03 RX ADMIN — PROPOFOL 25 MCG/KG/MIN: 10 INJECTION, EMULSION INTRAVENOUS at 11:15

## 2021-03-03 RX ADMIN — SUFENTANIL CITRATE 100 MCG: 50 INJECTION, SOLUTION EPIDURAL; INTRAVENOUS at 07:06

## 2021-03-03 RX ADMIN — SUFENTANIL CITRATE 100 MCG: 50 INJECTION, SOLUTION EPIDURAL; INTRAVENOUS at 08:05

## 2021-03-03 RX ADMIN — PROPOFOL 40 MCG/KG/MIN: 10 INJECTION, EMULSION INTRAVENOUS at 15:00

## 2021-03-03 RX ADMIN — HYDROCODONE BITARTRATE AND ACETAMINOPHEN 1 TABLET: 7.5; 325 TABLET ORAL at 20:26

## 2021-03-03 RX ADMIN — GLYCOPYRROLATE 0.4 MG: 0.4 INJECTION INTRAMUSCULAR; INTRAVENOUS at 10:30

## 2021-03-03 RX ADMIN — POTASSIUM CHLORIDE AND DEXTROSE MONOHYDRATE 30 ML/HR: 150; 5 INJECTION, SOLUTION INTRAVENOUS at 12:55

## 2021-03-03 RX ADMIN — MORPHINE SULFATE 2 MG: 2 INJECTION, SOLUTION INTRAMUSCULAR; INTRAVENOUS at 23:52

## 2021-03-03 RX ADMIN — NOREPINEPHRINE BITARTRATE 0.1 MCG/KG/MIN: 1 INJECTION, SOLUTION, CONCENTRATE INTRAVENOUS at 10:23

## 2021-03-03 RX ADMIN — NITROGLYCERIN 5 MCG/MIN: 20 INJECTION INTRAVENOUS at 12:30

## 2021-03-03 RX ADMIN — ALBUMIN HUMAN 500 ML: 0.05 INJECTION, SOLUTION INTRAVENOUS at 13:52

## 2021-03-03 RX ADMIN — SODIUM CHLORIDE, POTASSIUM CHLORIDE, SODIUM LACTATE AND CALCIUM CHLORIDE 9 ML/HR: 600; 310; 30; 20 INJECTION, SOLUTION INTRAVENOUS at 06:18

## 2021-03-03 RX ADMIN — CEFUROXIME SODIUM 1.5 G: 1.5 INJECTION, POWDER, FOR SOLUTION INTRAVENOUS at 15:35

## 2021-03-03 RX ADMIN — EPINEPHRINE 0.05 MCG/KG/MIN: 1 INJECTION PARENTERAL at 10:23

## 2021-03-03 RX ADMIN — HYDROCODONE BITARTRATE AND ACETAMINOPHEN 1 TABLET: 7.5; 325 TABLET ORAL at 15:39

## 2021-03-03 RX ADMIN — FUROSEMIDE 40 MG: 10 INJECTION INTRAMUSCULAR; INTRAVENOUS at 13:56

## 2021-03-03 RX ADMIN — ETOMIDATE 22 MG: 2 INJECTION, SOLUTION INTRAVENOUS at 07:06

## 2021-03-03 RX ADMIN — MIDAZOLAM 2 MG: 1 INJECTION INTRAMUSCULAR; INTRAVENOUS at 10:26

## 2021-03-03 RX ADMIN — PROPOFOL 50 MCG/KG/MIN: 10 INJECTION, EMULSION INTRAVENOUS at 12:13

## 2021-03-03 RX ADMIN — FAMOTIDINE 20 MG: 20 TABLET ORAL at 06:18

## 2021-03-03 RX ADMIN — GLYCOPYRROLATE 0.4 MG: 0.4 INJECTION INTRAMUSCULAR; INTRAVENOUS at 11:18

## 2021-03-03 RX ADMIN — PROTAMINE SULFATE 50 MG: 10 INJECTION, SOLUTION INTRAVENOUS at 12:54

## 2021-03-03 RX ADMIN — HEPARIN SODIUM 42000 UNITS: 1000 INJECTION, SOLUTION INTRAVENOUS; SUBCUTANEOUS at 08:10

## 2021-03-03 RX ADMIN — ATORVASTATIN CALCIUM 80 MG: 40 TABLET, FILM COATED ORAL at 20:26

## 2021-03-03 RX ADMIN — VECURONIUM BROMIDE 10 MG: 1 INJECTION, POWDER, LYOPHILIZED, FOR SOLUTION INTRAVENOUS at 10:26

## 2021-03-03 NOTE — ANESTHESIA POSTPROCEDURE EVALUATION
Patient: Yeison Bryant    Procedure Summary     Date: 03/03/21 Room / Location:  [facility] [address] [place] [address] [facility]    Anesthesia Start: 0657 Anesthesia Stop: 1215    Procedures:       MEDIAN STERNOTOMY, MAZE, AORTIC VALVE REPLACEMENT (27mm) WITH LEFT FEMORAL ARTERIAL CANNULATION (N/A Chest)      ASCENDING THORASIC AORTIC ANEURYSM REPAIR, DIONISIO PER ANESTHESIA (N/A Chest) Diagnosis:       Thoracic aortic aneurysm without rupture (CMS/HCC)      Bicuspid aortic valve      (Thoracic aortic aneurysm without rupture (CMS/HCC) [I71.2])      (Bicuspid aortic valve [Q23.1])    Surgeon: Evgeny Guerrier MD Provider: Rafa Neal MD    Anesthesia Type: general ASA Status: 4          Anesthesia Type: general    Vitals  Vitals Value Taken Time   BP     Temp     Pulse 69 03/03/21 1214   Resp     SpO2 100 % 03/03/21 1214   Vitals shown include unvalidated device data.        Post Anesthesia Care and Evaluation    Patient location during evaluation: ICU  Patient participation: complete - patient cannot participate  Level of consciousness: obtunded/minimal responses  Airway patency: patent  Anesthetic complications: No anesthetic complications    Cardiovascular status: acceptable  Respiratory status: intubated, ETT and ventilator  Hydration status: acceptable

## 2021-03-03 NOTE — OP NOTE
Operative Report    Preop Diagnosis: Back valve aortic valvular stenosis.  A sending aortic aneurysm.  Ejection fraction of 15 to 20%.  Obesity.  Diabetes.  Hypertension.  Atrial fibrillation.      Postoperative Diagnosis: Same      Procedure: Aortic valve replacement with a size 27 trifecta bioprosthetic tissue valve.  Replacement and repair of the a sending aortic aneurysm with a woven Dacron graft size 30 mm.  Maze procedure with ablation left superior and inferior pulmonary veins and ablation of the right superior and inferior pulmonary veins.  Left femoral arterial cutdown.        Surgeons: Evgeny Guerrier MD      Assistant: Audi Akers MD and Esdras lynn PA-C    The Assistant provided services of suctioning, irrigation and retraction.  Also, assisted in suture closure of parts of the skin incision.      Indication: This patient had been referred to me while in the hospital with significant aortic valvular disease and a bicuspid aortic valve.  Also had a significant ascending aortic aneurysm.  Cardiac catheterization demonstrated no significant coronary disease.  Unfortunately on a number of echoes his ejection fraction has varied anywhere from 20 to 25% to calculated as low as 15% recently.  He understood our valve choices for a bioprosthetic valve as well as the risk of the surgery of stroke bleeding infection and death.  We also understood we would perform a maze procedure and potentially ligate his left atrial appendage at the time but no guarantees were made as to resolution of his chronic atrial fibrillation.        Description: Supine position sterile prep and drape.  Antibiotics given.  Transesophageal echo in the operating room demonstrated ejection fraction approximately 20%.  Median sternotomy was performed and the patient heparinized.  This patient's a sending aneurysm extended higher to the base of the innominate and I realized we could not perform cannulation thoracic aorta.  We then performed a  left femoral arterial cutdown and cannulated the left common femoral artery for arterial inflow.  Venous return cannula was placed in the right atrial appendage.  Patient was begun on cardiopulmonary bypass was crossclamped at the very base of the innominate artery with no compromise in flow to either hemisphere based on oximetric measurements.  The aorta was opened valve leaflets were calcified thickened they were excised and a size 27 bioprosthetic trifecta tissue valve was sutured in place with Ethibond suture.  CO2 was used in the operative field to minimize the risk of air emboli.  Following the valve placement we then turned our attention to the a sending aortic aneurysm.  We replaced this with a size 30 woven Dacron tube graft.  A Bentall type procedure with reimplantation of the ostium was not performed as this patient had extremely poor ejection fraction.  Proximally just above the valve the dacryon 30 mm graft was sutured to the patient's aorta with running 4-0 Prolene suture.  Distally at the base of the innominate the distal end of the dacryon graft was fashioned in the site for size approximation and a running 4-0 Prolene suture was used here.  We then proceeded with ablation of both the left superior pulmonary vein and left inferior.  Pulmonary vein for Maze procedure.  We then proceeded with ablation of the right superior and inferior pulmonary vein.  We were unable to clip the left atrial appendage as transesophageal echo demonstrated clot within that appendage.  The heart was then de-aired through the vent placed in the a sending to graft and the left ventricular apex.  The clamp was released and we were further de-airing maneuvers performed.  We were able to wean from bypass without significant difficulty in a paced rhythm.  Transesophageal echo demonstrated normal prosthetic valvular function following this we removed a large 21 Nepali sheath from the left common femoral artery and primarily  repaired the left common femoral artery with Prolene suture.  The sternum was closed with wire the fashion skin was suture and the sponge and needle count reported as correct.  No red blood cells were transfused during this procedure.  Total cross-clamp time for the valve replacement and repair of the ascending aneurysm with a 2 graft as well as Maze procedure developed a cross-clamp time of 107 minutes and a cardiopulmonary bypass time of 119 minutes.  Again no red cells were transfused      Please note that portions of this note were completed with a voice recognition program. Efforts were made to edit the dictations, but occasionally words are mistranscribed.

## 2021-03-03 NOTE — CONSULTS
[facility] - Cardiology Consult    Yeison Bryant  1955  [id number]      Admission Date:  3/3/2021    Consultation Date:  03/03/21        PCP:  Kreis, Samuel Duane, MD  Referring MD:  Evgeny Guerrier MD  Consulting MD:  Dr. Mahendra Waldron        CC:  Thoracic Aortic Aneurysm without Rupture    Reason for Consult: PO management of BP, arrhythmias    PROBLEM LIST/PMHx:  1. Ascending Aortic Aneurysm  a. 5.6cm on CT chest   b. Replacement and repair of AAA with 30mm Dacron graft 3/3/2021, Dr. Guerrier  2. Valvular Heart Disease/Low flow- low gradient Aortic stenosis  a. Right and LHC 1/25/21: severe global hypokinesia and EF 20%, mod-severe PAH with elevated LVEDP; cardiac output low at 4.9L/min, normal coronaries, dilated aortic root   b. DIONISIO 1/26/21: EF 21-25%, bicuspid aortic valve with mild AS, moderate functional MR, severe dilation of ascending aorta measuring 5.7cm, RV is mildly dilated   c. Dobutamine stress echo 1/27/21: Best diagnosis is low flow, low gradient aortic stenosis.  d. AV Replacement 27 trifecta bioprosthetic tissue valve, 3/3/2021 Dr. Guerrier  3. Paroxysmal Atrial Fibrillation   a. Diagnosed 1/18/21 at time of hospitalization in [place]  b. CHADsVASC=2. On Eliquis.  c. Echo 1/19/21: EF 56-60%, LA is mildly increased, RVSP 42mmHg, moderate dilation of the aortic root   d. MAZE procedure with ablation of left/right superior and inferior pulmonary veins.  Unable to clip CHARLOTTE as DIONISIO demonstrated a cloth within the appendage.  4. Covid 19 infection 12/27/20  5. History of tobacco abuse, inactive 12 years ago   6. Probable ALESSANDRO   7. Obesity  8. Former smoking tobacco abuse, quit 2008       Allergies:  has No Known Allergies.    Medications Prior to Admission   Medication Sig Dispense Refill Last Dose   • aspirin 81 MG chewable tablet Chew 324 mg 1 (One) Time.   3/2/2021 at 1930   • atorvastatin (LIPITOR) 40 MG tablet Take 1 tablet by mouth Every Night. 30  tablet 5 3/2/2021 at 1930   • carvedilol (Coreg) 25 MG tablet Take 1 tablet by mouth 2 (Two) Times a Day With Meals. 60 tablet 5 3/3/2021 at 0400   • multivitamin with minerals (MULTIVITAMIN ADULTS PO) Take 1 tablet by mouth Daily.   3/3/2021 at 0400   • apixaban (ELIQUIS) 5 MG tablet tablet Take 1 tablet by mouth Every 12 (Twelve) Hours. Indications: Atrial Fibrillation (Patient taking differently: Take 5 mg by mouth Every 12 (Twelve) Hours. Indications: Atrial Fibrillation, Atrial Fibrillation) 60 tablet 11 2/27/2021       dexmedetomidine, 0.2-1.5 mcg/kg/hr  dextrose 5 % with KCl 20 mEq, 30 mL/hr, Last Rate: 30 mL/hr (03/03/21 1255)  DOBUTamine, 2-20 mcg/kg/min  DOPamine, 2-20 mcg/kg/min  EPINEPHrine, 0.02-0.3 mcg/kg/min, Last Rate: 0.05 mcg/kg/min (03/03/21 1203)  insulin, 0-50 Units/hr  niCARdipine, 5-15 mg/hr  nitroglycerin, 5-200 mcg/min  norepinephrine, 0.02-0.3 mcg/kg/min, Last Rate: 0.05 mcg/kg/min (03/03/21 1200)  phenylephrine, 0.5-3 mcg/kg/min  propofol, 5-50 mcg/kg/min, Last Rate: 50 mcg/kg/min (03/03/21 1213)            CARDIAC RISK FACTORS:   advanced age (older than 55 for men, 65 for women), male gender, obesity (BMI >= 30 kg/m2) and smoking/ tobacco exposure.         HPI:  Yeison Bryant is a 66 yo CM with PMHx PAF, ALESSANDRO, VHDz, former tobacco abuse who has been followed for AAA.  A recent hospitalization for acute diastolic CHF lead to a work up including echo/DIONISIO, stress testing, CT.  He was also noted to be in new onset AFib.  With review of test results it was determined his best diagnosis was low flow, low gradient severe AS with an ascending aortic aneurysm that measured 5.7 cm.  He was referred to Dr. Guerrier for surgical consultation.  Today, is  Day of surgery after undergoing AAA repair, Aortic Valve Replacement and MAZE of the pulmonary veins.  Dr. Guerrier has consulted Dr. Waldron to follow along post operatively for management of blood pressure and arrhythmias. Mr. Bryant is  "currently sedated on ventilator in the ICU.  Vitals are stable.  He has a temporary pacing wire with underlying SB.  CHARLOTTE clip was not performed secondary to clot present in the atrial appendage.          Social History     Socioeconomic History   • Marital status:      Spouse name: Not on file   • Number of children: 2   • Years of education: Not on file   • Highest education level: Not on file   Occupational History   • Occupation: small business owner   Tobacco Use   • Smoking status: Former Smoker     Packs/day: 2.00     Years: 36.00     Pack years: 72.00     Types: Cigarettes     Quit date: 2008     Years since quittin.1   • Smokeless tobacco: Never Used   Substance and Sexual Activity   • Alcohol use: Yes     Comment: OCCASIONAL ALCOHOL    • Drug use: Never   • Sexual activity: Defer   Social History Narrative    Caffeine 1 serving of coffee in morning. Lives in [facility]     Family History   Adopted: Yes   Family history unknown: Yes     Past Surgical History:   Procedure Laterality Date   • CARDIAC CATHETERIZATION Left 2021    Procedure: Left Heart Cath - COVID+ ;  Surgeon: Mahendra Waldron MD;  Location:  [facility];  Service: Cardiology;  Laterality: Left;   • CARDIAC CATHETERIZATION N/A 2021    Procedure: Aortic root aortogram;  Surgeon: Mahendra Waldron MD;  Location:  [facility];  Service: Cardiology;  Laterality: N/A;   • CATARACT EXTRACTION     • COLONOSCOPY     • EYE SURGERY      lasic and cataract   • KNEE SURGERY Bilateral    • TONSILLECTOMY       ROS: Pertinent items are noted in HPI, all other systems reviewed and negative     Objective     height is 188 cm (74\") and weight is 126 kg (277 lb 12.8 oz). His temporal temperature is 96 °F (35.6 °C). His blood pressure is 128/95 and his pulse is 76. His respiration is 16 and oxygen saturation is 96%.      Intake/Output Summary (Last 24 hours) at 3/3/2021 1320  Last data filed at " 3/3/2021 1215  Gross per 24 hour   Intake 2335 ml   Output 620 ml   Net 1715 ml         Physical Exam:  General Appearance:    Appears stated age, in no acute distress, on ventilator sedated   Head:    Normocephalic, without obvious abnormality, atraumatic   Eyes:            Lids and lashes normal, conjunctivae and sclerae normal, no   icterus, no pallor, PERRLA   Ears:    Ears appear intact with no abnormalities noted   Throat:   No oral lesions, no thrush, oral mucosa moist. + ET tube.   Neck:   No adenopathy, supple, trachea midline, no thyromegaly, no     carotid bruit, no JVD   Back:     Unable to assess   Lungs:     Clear to auscultation,respirations regular, even and                   Unlabored. + Vent.    Heart:    Regular rhythm and normal rate, normal S1 and S2, no            murmur, no gallop, no rub, no click       Abdomen:     Decreased bowel sounds, no masses, no organomegaly, soft non-tender, non-distended, no guarding, no rebound                 tenderness       Extremities:   No cyanosis, no redness, no edema   Pulses:   Pulses palpable and equal bilaterally   Skin:   No bleeding, bruising or rash   Lymph nodes:   No palpable adenopathy   Neurologic:  Unable to assess.          Results Review:  I personally reviewed the patient's clinical results.  Results from last 7 days   Lab Units 03/03/21  1224   WBC 10*3/mm3 14.47*   HEMOGLOBIN g/dL 11.5*   HEMATOCRIT % 35.3*   PLATELETS 10*3/mm3 220     Results from last 7 days   Lab Units 03/03/21  1224 03/01/21  1608   SODIUM mmol/L 141 144   POTASSIUM mmol/L 5.5* 4.6   CHLORIDE mmol/L 107 105   CO2 mmol/L 24.0 29.0   BUN mg/dL 21 14   CREATININE mg/dL 1.20 1.06   CALCIUM mg/dL 9.4 9.5   BILIRUBIN mg/dL  --  1.2   ALK PHOS U/L  --  96   ALT (SGPT) U/L  --  24   AST (SGOT) U/L  --  23   GLUCOSE mg/dL 189* 130*       Results from last 7 days   Lab Units 03/03/21  1224 03/01/21  1608   INR  1.01 0.98                   Radiology:  Imaging Results (Last 72  Hours)     Procedure Component Value Units Date/Time    XR Chest 1 View [[id number]] Collected: 03/03/21 1257     Updated: 03/03/21 1313    Narrative:      EXAMINATION: XR CHEST 1 VW-      INDICATION: Post-Op Check Line & Tube Placement; I71.2-Thoracic aortic  aneurysm, without rupture; Q23.1-Congenital insufficiency of aortic  valve      COMPARISON: 3/1/2021     FINDINGS: Endotracheal tube in good position above the lyubov. Pulmonary  artery catheter in place via right IJ approach. Median sternotomy wires  appear intact. Mediastinal and left pleural drains noted in place. There  is increased pulmonary vascular engorgement, without significant  effusion. No distinct pneumothorax. Unchanged degree of cardiac  enlargement.       Impression:      Endotracheal tube in good position above the lyubov.  Pulmonary artery catheter in place via right IJ approach. Median  sternotomy wires appear intact. Mediastinal and left pleural drains  noted in place. There is increased pulmonary vascular engorgement,  without significant effusion. No distinct pneumothorax. Unchanged degree  of cardiac enlargement.     This report was finalized on 3/3/2021 12:58 PM by Bruno Milian.               Tele:  NSR    Assessment/Plan     1.  Paroxysmal Atrial Fibrillation   -  CHADS2 Vasc = 2.   On Eliquis prior to surgery   -  CHARLOTTE attempted but could not be performed secondary to clot present in atrial appendage.  Continue to monitor.      2.  Ascending Aortic Aneurysm   -  DOS p replacement/repair using a 30mm Dacron graft, Dr. Guerrier.  Per CTS.   -  Continue high intensity statin.    3.  Low Flow, Low Gradient Severe Aortic Stenosis   -  EF by DIONISIO 1/26/21 21-25%.  Bicuspid aortic valve.  Normal coronaries by [facility]   -  DOS p Aortic Valve  Replacement using #27 trifecta bioprosthetic tissue valve.    Cardiology will continue to follow in post operative care.        I discussed the patients findings and my recommendations with the patient, any  present family members, and the nursing staff.  Mahendra Waldron MD saw and examined patient, verified hx and PE, read all radiographic studies, reviewed labs and micro data, and formulated dx, plan for treatment and all medical decision making.      Cheyenne Barron PA-C, working with Mahendra Waldron MD  03/03/21 13:20 EST  Electronically signed by EVA Martinez, 03/03/21, 1:51 PM EST.

## 2021-03-03 NOTE — ANESTHESIA PROCEDURE NOTES
Airway  Urgency: elective    Date/Time: 3/3/2021 7:08 AM  Airway not difficult    General Information and Staff    Patient location during procedure: OR    Indications and Patient Condition  Indications for airway management: airway protection    Preoxygenated: yes  MILS not maintained throughout  Mask difficulty assessment: 1 - vent by mask    Final Airway Details  Final airway type: endotracheal airway      Successful airway: ETT  Cuffed: yes   Successful intubation technique: video laryngoscopy  Endotracheal tube insertion site: oral  Blade: Matt  Blade size: 3  ETT size (mm): 7.5  Cormack-Lehane Classification: grade I - full view of glottis  Placement verified by: chest auscultation and capnometry   Measured from: lips  ETT/EBT  to lips (cm): 20  Number of attempts at approach: 1  Assessment: lips, teeth, and gum same as pre-op and atraumatic intubation    Additional Comments  Negative epigastric sounds, Breath sound equal bilaterally with symmetric chest rise and fall

## 2021-03-03 NOTE — PROGRESS NOTES
Intensive Care Follow-up      LOS: 0 days     Mr. Yeison Bryant, 65 y.o. male is followed for: Glycemic, Electrolyte, Respiratory, and Medical management     Subjective - Interval History     65-year-old white male with a history of tobacco abuse, valvular heart disease, cardiomyopathy with ejection fraction less than 25%, and atrial fibrillation underwent aortic valve replacement with a #27 trifecta bioprosthetic tissue valve, replacement/repair of ascending aortic aneurysm with woven dacryon graft size 30 mm, Maze procedure with ablation of bilateral pulmonary veins on 3/3/2021.    He has been moved to the intensive care unit postoperatively.  Currently mechanically ventilated and sedated on propofol.  On nitroglycerin, epinephrine, and norepinephrine drips.    Patient has a history of tobacco abuse, resolved in 2008    The patient's relevant past medical, surgical and social history were reviewed and updated in Epic as appropriate.     Objective     Infusions:  dexmedetomidine, 0.2-1.5 mcg/kg/hr  dextrose 5 % with KCl 20 mEq, 30 mL/hr, Last Rate: 30 mL/hr (03/03/21 1255)  DOBUTamine, 2-20 mcg/kg/min  DOPamine, 2-20 mcg/kg/min  EPINEPHrine, 0.02-0.3 mcg/kg/min, Last Rate: 0.05 mcg/kg/min (03/03/21 1203)  insulin, 0-50 Units/hr  niCARdipine, 5-15 mg/hr  nitroglycerin, 5-200 mcg/min, Last Rate: 5 mcg/min (03/03/21 1230)  norepinephrine, 0.02-0.3 mcg/kg/min, Last Rate: 0.05 mcg/kg/min (03/03/21 1200)  phenylephrine, 0.5-3 mcg/kg/min  propofol, 5-50 mcg/kg/min, Last Rate: 50 mcg/kg/min (03/03/21 1355)      Medications:  albumin human, 500 mL, Intravenous, Once  [START ON 3/4/2021] aspirin, 325 mg, Oral, Daily  atorvastatin, 80 mg, Oral, Nightly  cefuroxime, 1.5 g, Intravenous, Q8H  chlorhexidine, 15 mL, Mouth/Throat, Q12H  [START ON 3/4/2021] metoprolol tartrate, 12.5 mg, Oral, Q12H  metoprolol tartrate, 2.5 mg, Intravenous, Q6H      Intake/Output       03/03/21 0700 - 03/04/21 0659    Intake (ml) 2335    Output  (ml) 620    Net (ml) 1715        Vital Sign Min/Max for last 24 hours  Temp  Min: 96 °F (35.6 °C)  Max: 97.4 °F (36.3 °C)   BP  Min: 80/59  Max: 136/101   Pulse  Min: 68  Max: 80   Resp  Min: 16  Max: 16   SpO2  Min: 96 %  Max: 100 %   No data recorded        Physical Exam:   GENERAL: Intubated, sedated, no distress   HEENT: ET tube at 22 cm.  Right IJ introducer site okay   LUNGS: No wheezes or rhonchi   HEART: Irregular rate and rhythm.  Sternal incision without drainage or dehiscence.  MTs without active drainage or air leak   GI: Soft, quiet   EXTREMITIES: Palpable pulses.  Trace edema.  No cyanosis or clubbing   NEURO/PSYCH: Sedated and unresponsive    Results from last 7 days   Lab Units 03/03/21  1224 03/01/21  1608   WBC 10*3/mm3 14.47* 9.05   HEMOGLOBIN g/dL 11.5* 15.6   PLATELETS 10*3/mm3 220 179     Results from last 7 days   Lab Units 03/03/21  1224 03/01/21  1608   SODIUM mmol/L 141 144   POTASSIUM mmol/L 5.5* 4.6   CO2 mmol/L 24.0 29.0   BUN mg/dL 21 14   CREATININE mg/dL 1.20 1.06   MAGNESIUM mg/dL 2.2 1.8   PHOSPHORUS mg/dL 4.2  --    GLUCOSE mg/dL 189* 130*     Estimated Creatinine Clearance: 87.7 mL/min (by C-G formula based on SCr of 1.2 mg/dL).    Results from last 7 days   Lab Units 03/01/21  1608   HEMOGLOBIN A1C % 5.90*       Results from last 7 days   Lab Units 03/03/21  1241   PH, ARTERIAL pH units 7.344*   PCO2, ARTERIAL mm Hg 45.5*   PO2 ART mm Hg 451.0*     No results found for: LACTATE       Images: Post procedure chest x-ray reveals appropriate line and tube placement without consolidation or pneumothorax    I reviewed the patient's results and images.     Impression      Active Hospital Problems    Diagnosis   • **Acending thoracic aortic aneurysm (Repair/replace w/ woven Dacron graft size 30 mm 3/3/21)   • Bicuspid aortic valve (S/P 27 trifecta bioprosthetic tissue valve 3/3/21)   • New onset atrial fibrillation (S/p Maze w/ Pulmonary Vein Ablation 3/3/21))   • Cardiomyopathy (EF  <25%)   • COVID-19 virus (12/2020)   • Former smoker         Plan        Wean mechanical ventilatory support when hemodynamically stable  Insulin drip and transition to intermittent insulin after 24 hours  Follow-up electrolytes and replace as necessary  Inhaled bronchodilators as necessary   Plan of care and goals reviewed with Multidisciplinary Team and Antibiotic Stewardship rounds   I discussed the patient's findings and my recommendations with nursing staff      High level of risk due to:  illness with threat to life or bodily function, parenteral controlled substances and drugs requiring intensive monitoring for toxicity.     JAX Christiansen MD  Pulmonary and Critical Care Medicine

## 2021-03-03 NOTE — ANESTHESIA PROCEDURE NOTES
Procedure Performed: Emergent/Open-Heart Anesthesia DIONISIO     Start Time:        End Time:      Preanesthesia Checklist:  Patient identified, IV assessed, risks and benefits discussed, monitors and equipment assessed, procedure being performed at surgeon's request and anesthesia consent obtained.    General Procedure Information  Diagnostic Indications for Echo:  assessment of ascending aorta, assessment of surgical repair and hemodynamic monitoring  Physician Requesting Echo: Evgeny Guerrier MD  Location performed:  OR  Intubated  Bite block not placed  Heart visualized  Probe Insertion:  Easy  Probe Type:  Multiplane  Modalities:  Color flow mapping, continuous wave Doppler and pulse wave Doppler    Echocardiographic and Doppler Measurements    Ventricles    Right Ventricle:  Cavity size dilated.  Hypertrophy not present.  Thrombus not present.  Global function normal.    Left Ventricle:  Cavity size normal.  Diastolic dimension 1.4 cm.  Hypertrophy present.  Thrombus not present.  Global Function severely impaired.  Ejection Fraction 25%.          Valves    Aortic Valve:  Annulus dilated.  Stenosis mild.  Area: 1.9 cm².  Mean Gradient: 11 mean 7 mmHg.  Regurgitation trace.  Leaflets bicuspid.  Leaflet motions normal and restricted.      Mitral Valve:  Annulus normal.  Area: 5.5 cm².  Mean Gradient: 2 mean 1 mmHg.  Regurgitation trace.  Leaflets normal.  Leaflet motions normal.      Tricuspid Valve:  Annulus normal.  Stenosis not present.  Regurgitation trace.  Leaflets normal.  Leaflet motions normal.    Pulmonic Valve:  Annulus normal.          Aorta    Ascending Aorta:  Size aneurysmal.  Dissection not present.  Plaque thickness less than 3 mm.  Mobile plaque not present.    Aortic Arch:  Size normal.  Dissection not present.  Plaque thickness less than 3 mm.  Mobile plaque not present.    Descending Aorta:  Size normal.  Dissection not present.  Plaque thickness less than 3 mm.  Mobile plaque not present.           Atria    Right Atrium:  Size normal.  Spontaneous echo contrast not present.  Thrombus not present.  Tumor not present.  Device present.    Left Atrium:  Size normal.  Spontaneous echo contrast not present.  Thrombus not present.  Tumor not present.  Left atrial appendage thrombus.  Other Atria Findings:       Question clot CHARLOTTE on multuple views    Septa    Atrial Septum:  Intra-atrial septal morphology normal.      Ventricular Septum:  Intra-ventricular septum morphology normal.      Diastolic Function Measurements:  Diastolic Dysfunction Grade=  E= ms  A= ms  E/A Ratio=  DT= ms  S/D= 0.6  IVRT=    Other Findings  Pericardium:  normal  Pleural Effusion:  none  Pulmonary Arteries:  normal  Pulmonary Venous Flow:  normal    Anesthesia Information  Performed Personally  Anesthesiologist:  Rafa Neal MD      Echocardiogram Comments:       Informed consent was obtained preoperatively.  Noel probe passed without difficulty.  FIndings discussed with surgeon.  Post MAZE, AVR, Ascending aortic graft:  Tissue prosthetic valve open s and coapts well, central trace AI, AV mean 5; ef 25 %

## 2021-03-03 NOTE — PLAN OF CARE
Goal Outcome Evaluation:        Outcome Summary: to unit approx 1205. on epi, levo and propofol. labile. treated with volume and drip titration. ntg, insulin and precedex started throughout day. justino updated regarding potassium of 5.5. treated with lasix. woke up and weaned off vent without problem. lortab to treat pain. v paced at 80. underlying rhythm is sb. ng clamped. family updated by phone.

## 2021-03-03 NOTE — H&P
Progress Notes       02/08/2021  Patient Information  Yeison Bryant                                                                                          [address] [place] [id number]   1955  'PCP/Referring Physician'  Kreis, Samuel Duane, MD  [phone]  No ref. provider found          Chief Complaint   Patient presents with   • Consult       Np referred for an ascending aortic aneurysm found during a work up for shortness of breath.   • Thoracic Aneurysm         History of Present Illness:  The patient is a 65-year-old male who I saw in the hospital at the request of Dr. Mahendra Waldron to evaluate an ascending aortic aneurysm and a bicuspid aortic valve. His coronary arteries are normal.  This patient did have Covid in mid December, 2020.  At this time, he has some shortness of breath and mild congestive failure symptoms with exertion. He is here to further discuss potential surgery.  He has a number of questions regarding the procedure.  Some of the information was already obtained during the patient's hospitalization and from his chart from other physicians.  Also, I discussed this case with Dr. Mahendra Waldron.         Patient Active Problem List   Diagnosis   • CHF (congestive heart failure) (CMS/HCC)   • New onset atrial fibrillation (CMS/HCC)   • Former smoker   • Hyperglycemia   • Cardiomegaly   • COVID-19 virus detected   • Obesity (BMI 30-39.9)   • Chest pain in adult   • Thoracic aortic aneurysm without rupture (CMS/HCC)   • Bicuspid aortic valve           Past Medical History:   Diagnosis Date   • Arrhythmia     • Arthritis     • Atrial fibrillation (CMS/HCC)     • Cataract     • CHF (congestive heart failure) (CMS/HCC) 01/18/2021   • COVID-19 12/2020   • Dyslipidemia     • Former smoker     • Hypertension     • Obesity (BMI 30-39.9)              Past Surgical History:   Procedure Laterality Date   • CARDIAC CATHETERIZATION Left 1/25/2021     Procedure: Left Heart Cath - COVID+ 12/27;   Surgeon: Mahendra Waldron MD;  Location:  [facility];  Service: Cardiology;  Laterality: Left;   • CARDIAC CATHETERIZATION N/A 2021     Procedure: Aortic root aortogram;  Surgeon: Mahendra Waldron MD;  Location:  [facility];  Service: Cardiology;  Laterality: N/A;   • CATARACT EXTRACTION       • COLONOSCOPY       • EYE SURGERY         lasic and cataract   • KNEE SURGERY Bilateral     • TONSILLECTOMY             Current Outpatient Medications:   •  apixaban (ELIQUIS) 5 MG tablet tablet, Take 1 tablet by mouth Every 12 (Twelve) Hours. Indications: Atrial Fibrillation, Disp: 60 tablet, Rfl: 11  •  atorvastatin (LIPITOR) 40 MG tablet, Take 1 tablet by mouth Every Night for 30 days., Disp: 30 tablet, Rfl: 0  •  carvedilol (Coreg) 25 MG tablet, Take 1 tablet by mouth 2 (Two) Times a Day With Meals for 30 days., Disp: 60 tablet, Rfl: 0  •  guaiFENesin (MUCINEX) 600 MG 12 hr tablet, Take 600 mg by mouth 2 (Two) Times a Day As Needed for Cough., Disp: , Rfl:   •  multivitamin with minerals (MULTIVITAMIN ADULTS PO), Take 1 tablet by mouth Daily., Disp: , Rfl:   No Known Allergies  Social History            Socioeconomic History   • Marital status:        Spouse name: Not on file   • Number of children: 2   • Years of education: Not on file   • Highest education level: Not on file   Occupational History   • Occupation: small business owner   Tobacco Use   • Smoking status: Former Smoker       Packs/day: 2.00       Years: 36.00       Pack years: 72.00       Types: Cigarettes       Quit date: 2008       Years since quittin.0   • Smokeless tobacco: Never Used   Substance and Sexual Activity   • Alcohol use: Yes       Comment: OCCASIONAL ALCOHOL    • Drug use: Never   • Sexual activity: Defer   Social History Narrative     Caffeine 1 serving of coffee in morning. Lives in [place]      Family History   Adopted: Yes   Family history unknown: Yes      Review of Systems  "  Constitution: Positive for malaise/fatigue. Negative for chills, fever, night sweats and weight loss.   HENT: Negative for hearing loss, odynophagia and sore throat.    Cardiovascular: Positive for dyspnea on exertion, leg swelling and palpitations. Negative for chest pain and orthopnea.   Respiratory: Positive for cough. Negative for hemoptysis.    Endocrine: Negative for cold intolerance, heat intolerance, polydipsia, polyphagia and polyuria.   Hematologic/Lymphatic: Does not bruise/bleed easily.   Skin: Negative for itching and rash.   Musculoskeletal: Positive for joint pain. Negative for joint swelling and myalgias.   Gastrointestinal: Negative.  Negative for abdominal pain, constipation, diarrhea, hematemesis, hematochezia, melena, nausea and vomiting.   Genitourinary: Negative.  Negative for dysuria, frequency and hematuria.   Neurological: Negative for focal weakness, headaches, numbness and seizures.   Psychiatric/Behavioral: Negative for suicidal ideas.   All other systems reviewed and are negative.         Vitals:     02/08/21 0713   BP: 145/99   BP Location: Right arm   Patient Position: Sitting   Pulse: 87   Temp: 97.1 °F (36.2 °C)   SpO2: 97%   Weight: 124 kg (273 lb)   Height: 188 cm (74\")      Physical Exam    CONSTITUTIONAL: Alert and conversant, Well dressed, Well nourished, No acute distress  EYES: Sclera clean, Anicteric, Pupils equal  ENT: No nasal deviation, Trachea midline  NECK: No neck masses, Supple  LUNGS: No wheezing, Cough, non-congested  HEART: No rubs, soft murmur to the right of the sternal border  GASTROINTESTINAL: Soft, non-distended, No masses, Non tender  to palpation, normal bowel sounds  NEURO: No motor deficits, No sensory deficits, Cranial Nerves 2 through 12 grossly intact  PSYCHIATRIC: Oriented to person, place and time, No memory deficits, Mood appropriate  VASCULAR: No carotid bruits, Femoral pulses palpable and symmetric  MUSKULOSKELETAL: No extremity trauma or " extremity asymmetry     The ROS, past medical history, surgical history, family history, social history and vitals were reviewed by myself and corrected as needed.       Labs/Imaging:   The patient has a 5.6 to 5.7 cm ascending aorta and a bicuspid aortic valve.  No significant coronary disease on heart catheterization images.     Assessment/Plan:   The patient is a 65-year-old male who is being referred for an ascending thoracic aneurysm. I have discussed various options and choices with this patient.  He is very much agreeable to a bioprosthetic valve.  I do not believe his aorta is truly aneurysmal in terms of its connective tissue make-up.  Rather, this is probably, some post stenotic dilatation associated with his bicuspid valve.  We plan to replace his ascending aorta with a dacron graft and replace his aortic valve with a bioprosthetic tissue valve.  He is aware that this valve potentially could degenerate in his lifetime. Then we could proceed with a transcatheter valve.  He is also aware that surgery has overall risks of strokes bleedings infection and death and no guarantees have been made as to the  outcome.  We will try to schedule this in the last week in February or first week in March.         Patient Active Problem List   Diagnosis   • CHF (congestive heart failure) (CMS/HCC)   • New onset atrial fibrillation (CMS/HCC)   • Former smoker   • Hyperglycemia   • Cardiomegaly   • COVID-19 virus detected   • Obesity (BMI 30-39.9)   • Chest pain in adult   • Thoracic aortic aneurysm without rupture (CMS/HCC)   • Bicuspid aortic valve      CC:      MD Cecy Burnett editing for Evgeny Guerrier MD     I, Evgeny Guerrier MD, have read and agree with the editing done by Cecy Pang, .           [facility] Pre-op    Full history and physical note from office is up to date.     /95 (BP Location: Right arm, Patient Position: Lying)   Pulse 76   Temp 96  "°F (35.6 °C) (Temporal)   Resp 16   Ht 188 cm (74\")   Wt 126 kg (277 lb 12.8 oz)   SpO2 96%   BMI 35.67 kg/m²   Patient denies allergies to contrast dye or latex  IMM:  Influenza: Yes  Pneumococcal: Yes  Tetanus: Yes  Lungs: Clear to auscultation bilaterally to the bases  Cardiovascular: S1 and S2 without murmurs or gallops.  There is a 3/6 systolic ejection murmur.  It is an irregularly irregular rhythm  Abdomen: Soft, nontender, bowel sounds present throughout    LAB Results:  Lab Results   Component Value Date    WBC 9.05 03/01/2021    HGB 15.6 03/01/2021    HCT 47.4 03/01/2021    MCV 93.3 03/01/2021     03/01/2021    NEUTROABS 5.83 03/01/2021    GLUCOSE 130 (H) 03/01/2021    BUN 14 03/01/2021    CREATININE 1.06 03/01/2021    EGFRIFNONA 70 03/01/2021     03/01/2021    K 4.6 03/01/2021     03/01/2021    CO2 29.0 03/01/2021    MG 1.8 03/01/2021    PHOS 2.6 01/19/2021    CALCIUM 9.5 03/01/2021    ALBUMIN 4.50 03/01/2021    AST 23 03/01/2021    ALT 24 03/01/2021    BILITOT 1.2 03/01/2021    PTT 32.5 03/01/2021    INR 0.98 03/01/2021   Plan: Aortic valve replacement, repair ascending aortic aneurysm, Maze procedure, left atrial appendage ligation.    Cancer Staging (if applicable)  Cancer Patient: __ yes __no __unknown__N/A; If yes, clinical stage T:__ N:__M:__, stage group or __N/A    This patient was seen in the office and then again today in the preoperative area.  He is aware that we will use a bioprosthetic valve.  And repair is a sending aortic aneurysm.  He understands that his surgery is has a risk associated of stroke bleeding infection and death.  His ejection fraction was 25% on his last echocardiogram and he has significant obesity.  We will also attempt ablation of the pulmonary veins and ligation of the left atrial appendage.  No guarantees as to the cure of atrial fibrillation are made and the patient does understand that.  All these have been explained in the office and then " again today for the second occasion and he agrees to proceed  EVA Chris 3/3/2021 06:44 EST

## 2021-03-03 NOTE — RESEARCH
RISK SCORES  Procedure: Isolated AVR  Risk of Mortality:  2.068%  Renal Failure:  2.683%  Permanent Stroke:  0.659%  Prolonged Ventilation:  17.772%  DSW Infection:  0.196%  Reoperation:  5.297%  Morbidity or Mortality:  22.379%  Short Length of Stay:  22.436%  Long Length of Stay:  7.876%       I  Disagree completely with the above assessment.  This patient is not having an isolated aortic valve replacement.  He is having an aortic valve replacement with combination repair of an ascending aortic aneurysm with graft and his cardiac ejection fraction is 15% and he has significant obesity.  I believe his risk of mortality is higher than the score above indicates as that is for isolated valve surgery

## 2021-03-03 NOTE — ANESTHESIA PROCEDURE NOTES
Central Line      Patient reassessed immediately prior to procedure    Patient location during procedure: OR  Indications: vascular access  Preanesthetic Checklist  Completed: patient identified, site marked, surgical consent, pre-op evaluation, timeout performed, IV checked, risks and benefits discussed and monitors and equipment checked  Central Line Prep  Sterile Tech:cap, gloves, gown, mask and sterile barriers  Prep: chloraprep  Patient monitoring: blood pressure monitoring, continuous pulse oximetry and EKG  Central Line Procedure  Laterality:right  Location:internal jugular  Catheter Type:Cordis and Questa-Zain  Catheter Size:9 Fr  Guidance:landmark technique and palpation technique  PROCEDURE NOTE/ULTRASOUND INTERPRETATION.  Using ultrasound guidance the potential vascular sites for insertion of the catheter were visualized to determine the patency of the vessel to be used for vascular access.  After selecting the appropriate site for insertion, the needle was visualized under ultrasound being inserted into the internal jugular vein, followed by ultrasound confirmation of wire and catheter placement. There were no abnormalities seen on ultrasound; an image was taken; and the patient tolerated the procedure with no complications. Images: still images obtained, printed/placed on chart  Assessment  Post procedure:biopatch applied, line sutured, occlusive dressing applied and secured with tape  Assessement:blood return through all ports, free fluid flow, chest x-ray ordered and Alonzo Test  Complications:no  Patient Tolerance:patient tolerated the procedure well with no apparent complications

## 2021-03-04 ENCOUNTER — APPOINTMENT (OUTPATIENT)
Dept: GENERAL RADIOLOGY | Facility: HOSPITAL | Age: 66
End: 2021-03-04

## 2021-03-04 LAB
ACT BLD: 114 SECONDS (ref 82–152)
ACT BLD: 114 SECONDS (ref 82–152)
ACT BLD: 120 SECONDS (ref 82–152)
ACT BLD: 120 SECONDS (ref 82–152)
ACT BLD: 423 SECONDS (ref 82–152)
ACT BLD: 555 SECONDS (ref 82–152)
ACT BLD: 681 SECONDS (ref 82–152)
ACT BLD: 687 SECONDS (ref 82–152)
ACT BLD: 951 SECONDS (ref 82–152)
ALBUMIN SERPL-MCNC: 4.8 G/DL (ref 3.5–5.2)
ANION GAP SERPL CALCULATED.3IONS-SCNC: 9 MMOL/L (ref 5–15)
BASE EXCESS BLDA CALC-SCNC: 0 MMOL/L (ref -5–5)
BASE EXCESS BLDA CALC-SCNC: 2 MMOL/L (ref -5–5)
BASE EXCESS BLDA CALC-SCNC: 3 MMOL/L (ref -5–5)
BASE EXCESS BLDA CALC-SCNC: 5 MMOL/L (ref -5–5)
BASOPHILS # BLD AUTO: 0.03 10*3/MM3 (ref 0–0.2)
BASOPHILS NFR BLD AUTO: 0.2 % (ref 0–1.5)
BH BB BLOOD EXPIRATION DATE: NORMAL
BH BB BLOOD TYPE BARCODE: 6200
BH BB BLOOD TYPE BARCODE: 7300
BH BB BLOOD TYPE BARCODE: 7300
BH BB DISPENSE STATUS: NORMAL
BH BB PRODUCT CODE: NORMAL
BH BB UNIT NUMBER: NORMAL
BUN SERPL-MCNC: 25 MG/DL (ref 8–23)
BUN/CREAT SERPL: 13.5 (ref 7–25)
CA-I BLDA-SCNC: 1.01 MMOL/L (ref 1.2–1.32)
CA-I BLDA-SCNC: 1.02 MMOL/L (ref 1.2–1.32)
CA-I BLDA-SCNC: 1.02 MMOL/L (ref 1.2–1.32)
CA-I BLDA-SCNC: 1.04 MMOL/L (ref 1.2–1.32)
CA-I BLDA-SCNC: 1.05 MMOL/L (ref 1.2–1.32)
CA-I BLDA-SCNC: 1.07 MMOL/L (ref 1.2–1.32)
CA-I BLDA-SCNC: 1.21 MMOL/L (ref 1.2–1.32)
CA-I BLDA-SCNC: 1.25 MMOL/L (ref 1.2–1.32)
CA-I BLDA-SCNC: 1.42 MMOL/L (ref 1.2–1.32)
CA-I BLDA-SCNC: 1.49 MMOL/L (ref 1.2–1.32)
CALCIUM SPEC-SCNC: 9.7 MG/DL (ref 8.6–10.5)
CHLORIDE SERPL-SCNC: 101 MMOL/L (ref 98–107)
CO2 BLDA-SCNC: 26 MMOL/L (ref 24–29)
CO2 BLDA-SCNC: 27 MMOL/L (ref 24–29)
CO2 BLDA-SCNC: 27 MMOL/L (ref 24–29)
CO2 BLDA-SCNC: 29 MMOL/L (ref 24–29)
CO2 BLDA-SCNC: 30 MMOL/L (ref 24–29)
CO2 BLDA-SCNC: 30 MMOL/L (ref 24–29)
CO2 BLDA-SCNC: 31 MMOL/L (ref 24–29)
CO2 SERPL-SCNC: 27 MMOL/L (ref 22–29)
CREAT SERPL-MCNC: 1.85 MG/DL (ref 0.76–1.27)
CROSSMATCH INTERPRETATION: NORMAL
CROSSMATCH INTERPRETATION: NORMAL
CYTO UR: NORMAL
DEPRECATED RDW RBC AUTO: 47.2 FL (ref 37–54)
DEPRECATED RDW RBC AUTO: 47.4 FL (ref 37–54)
EOSINOPHIL # BLD AUTO: 0.01 10*3/MM3 (ref 0–0.4)
EOSINOPHIL NFR BLD AUTO: 0.1 % (ref 0.3–6.2)
ERYTHROCYTE [DISTWIDTH] IN BLOOD BY AUTOMATED COUNT: 13.6 % (ref 12.3–15.4)
ERYTHROCYTE [DISTWIDTH] IN BLOOD BY AUTOMATED COUNT: 13.6 % (ref 12.3–15.4)
GFR SERPL CREATININE-BSD FRML MDRD: 37 ML/MIN/1.73
GLUCOSE BLDC GLUCOMTR-MCNC: 116 MG/DL (ref 70–130)
GLUCOSE BLDC GLUCOMTR-MCNC: 120 MG/DL (ref 70–130)
GLUCOSE BLDC GLUCOMTR-MCNC: 122 MG/DL (ref 70–130)
GLUCOSE BLDC GLUCOMTR-MCNC: 123 MG/DL (ref 70–130)
GLUCOSE BLDC GLUCOMTR-MCNC: 125 MG/DL (ref 70–130)
GLUCOSE BLDC GLUCOMTR-MCNC: 126 MG/DL (ref 70–130)
GLUCOSE BLDC GLUCOMTR-MCNC: 129 MG/DL (ref 70–130)
GLUCOSE BLDC GLUCOMTR-MCNC: 130 MG/DL (ref 70–130)
GLUCOSE BLDC GLUCOMTR-MCNC: 132 MG/DL (ref 70–130)
GLUCOSE BLDC GLUCOMTR-MCNC: 133 MG/DL (ref 70–130)
GLUCOSE BLDC GLUCOMTR-MCNC: 136 MG/DL (ref 70–130)
GLUCOSE BLDC GLUCOMTR-MCNC: 138 MG/DL (ref 70–130)
GLUCOSE BLDC GLUCOMTR-MCNC: 139 MG/DL (ref 70–130)
GLUCOSE BLDC GLUCOMTR-MCNC: 140 MG/DL (ref 70–130)
GLUCOSE BLDC GLUCOMTR-MCNC: 140 MG/DL (ref 70–130)
GLUCOSE BLDC GLUCOMTR-MCNC: 141 MG/DL (ref 70–130)
GLUCOSE BLDC GLUCOMTR-MCNC: 146 MG/DL (ref 70–130)
GLUCOSE BLDC GLUCOMTR-MCNC: 149 MG/DL (ref 70–130)
GLUCOSE BLDC GLUCOMTR-MCNC: 150 MG/DL (ref 70–130)
GLUCOSE BLDC GLUCOMTR-MCNC: 153 MG/DL (ref 70–130)
GLUCOSE BLDC GLUCOMTR-MCNC: 153 MG/DL (ref 70–130)
GLUCOSE BLDC GLUCOMTR-MCNC: 155 MG/DL (ref 70–130)
GLUCOSE BLDC GLUCOMTR-MCNC: 160 MG/DL (ref 70–130)
GLUCOSE BLDC GLUCOMTR-MCNC: 161 MG/DL (ref 70–130)
GLUCOSE BLDC GLUCOMTR-MCNC: 162 MG/DL (ref 70–130)
GLUCOSE BLDC GLUCOMTR-MCNC: 163 MG/DL (ref 70–130)
GLUCOSE BLDC GLUCOMTR-MCNC: 165 MG/DL (ref 70–130)
GLUCOSE BLDC GLUCOMTR-MCNC: 171 MG/DL (ref 70–130)
GLUCOSE BLDC GLUCOMTR-MCNC: 171 MG/DL (ref 70–130)
GLUCOSE BLDC GLUCOMTR-MCNC: 174 MG/DL (ref 70–130)
GLUCOSE BLDC GLUCOMTR-MCNC: 189 MG/DL (ref 70–130)
GLUCOSE SERPL-MCNC: 148 MG/DL (ref 65–99)
HCO3 BLDA-SCNC: 25.2 MMOL/L (ref 22–26)
HCO3 BLDA-SCNC: 25.4 MMOL/L (ref 22–26)
HCO3 BLDA-SCNC: 25.8 MMOL/L (ref 22–26)
HCO3 BLDA-SCNC: 27.5 MMOL/L (ref 22–26)
HCO3 BLDA-SCNC: 28 MMOL/L (ref 22–26)
HCO3 BLDA-SCNC: 28.2 MMOL/L (ref 22–26)
HCO3 BLDA-SCNC: 29 MMOL/L (ref 22–26)
HCO3 BLDA-SCNC: 29.5 MMOL/L (ref 22–26)
HCO3 BLDA-SCNC: 29.8 MMOL/L (ref 22–26)
HCO3 BLDA-SCNC: 29.9 MMOL/L (ref 22–26)
HCT VFR BLD AUTO: 31.9 % (ref 37.5–51)
HCT VFR BLD AUTO: 32.2 % (ref 37.5–51)
HCT VFR BLDA CALC: 29 % (ref 38–51)
HCT VFR BLDA CALC: 29 % (ref 38–51)
HCT VFR BLDA CALC: 31 % (ref 38–51)
HCT VFR BLDA CALC: 33 % (ref 38–51)
HCT VFR BLDA CALC: 33 % (ref 38–51)
HCT VFR BLDA CALC: 34 % (ref 38–51)
HCT VFR BLDA CALC: 34 % (ref 38–51)
HCT VFR BLDA CALC: 36 % (ref 38–51)
HCT VFR BLDA CALC: 37 % (ref 38–51)
HCT VFR BLDA CALC: 42 % (ref 38–51)
HGB BLD-MCNC: 10.3 G/DL (ref 13–17.7)
HGB BLD-MCNC: 10.5 G/DL (ref 13–17.7)
HGB BLDA-MCNC: 10.5 G/DL (ref 12–17)
HGB BLDA-MCNC: 11.2 G/DL (ref 12–17)
HGB BLDA-MCNC: 11.2 G/DL (ref 12–17)
HGB BLDA-MCNC: 11.6 G/DL (ref 12–17)
HGB BLDA-MCNC: 11.6 G/DL (ref 12–17)
HGB BLDA-MCNC: 12.2 G/DL (ref 12–17)
HGB BLDA-MCNC: 12.6 G/DL (ref 12–17)
HGB BLDA-MCNC: 14.3 G/DL (ref 12–17)
HGB BLDA-MCNC: 9.9 G/DL (ref 12–17)
HGB BLDA-MCNC: 9.9 G/DL (ref 12–17)
IMM GRANULOCYTES # BLD AUTO: 0.07 10*3/MM3 (ref 0–0.05)
IMM GRANULOCYTES NFR BLD AUTO: 0.5 % (ref 0–0.5)
INR PPP: 1.22 (ref 0.85–1.16)
LAB AP CASE REPORT: NORMAL
LAB AP CLINICAL INFORMATION: NORMAL
LYMPHOCYTES # BLD AUTO: 0.88 10*3/MM3 (ref 0.7–3.1)
LYMPHOCYTES NFR BLD AUTO: 6.7 % (ref 19.6–45.3)
MAGNESIUM SERPL-MCNC: 1.7 MG/DL (ref 1.6–2.4)
MCH RBC QN AUTO: 30.4 PG (ref 26.6–33)
MCH RBC QN AUTO: 31 PG (ref 26.6–33)
MCHC RBC AUTO-ENTMCNC: 32.3 G/DL (ref 31.5–35.7)
MCHC RBC AUTO-ENTMCNC: 32.6 G/DL (ref 31.5–35.7)
MCV RBC AUTO: 94.1 FL (ref 79–97)
MCV RBC AUTO: 95 FL (ref 79–97)
MONOCYTES # BLD AUTO: 0.9 10*3/MM3 (ref 0.1–0.9)
MONOCYTES NFR BLD AUTO: 6.8 % (ref 5–12)
NEUTROPHILS NFR BLD AUTO: 11.32 10*3/MM3 (ref 1.7–7)
NEUTROPHILS NFR BLD AUTO: 85.7 % (ref 42.7–76)
NRBC BLD AUTO-RTO: 0 /100 WBC (ref 0–0.2)
PATH REPORT.FINAL DX SPEC: NORMAL
PATH REPORT.GROSS SPEC: NORMAL
PCO2 BLDA: 40.1 MM HG (ref 35–45)
PCO2 BLDA: 45.1 MM HG (ref 35–45)
PCO2 BLDA: 45.6 MM HG (ref 35–45)
PCO2 BLDA: 46 MM HG (ref 35–45)
PCO2 BLDA: 48.8 MM HG (ref 35–45)
PCO2 BLDA: 49 MM HG (ref 35–45)
PCO2 BLDA: 49.4 MM HG (ref 35–45)
PCO2 BLDA: 49.6 MM HG (ref 35–45)
PCO2 BLDA: 52.7 MM HG (ref 35–45)
PCO2 BLDA: 56.8 MM HG (ref 35–45)
PH BLDA: 7.3 PH UNITS (ref 7.35–7.6)
PH BLDA: 7.35 PH UNITS (ref 7.35–7.6)
PH BLDA: 7.35 PH UNITS (ref 7.35–7.6)
PH BLDA: 7.36 PH UNITS (ref 7.35–7.6)
PH BLDA: 7.36 PH UNITS (ref 7.35–7.6)
PH BLDA: 7.39 PH UNITS (ref 7.35–7.6)
PH BLDA: 7.4 PH UNITS (ref 7.35–7.6)
PHOSPHATE SERPL-MCNC: 4 MG/DL (ref 2.5–4.5)
PLATELET # BLD AUTO: 197 10*3/MM3 (ref 140–450)
PLATELET # BLD AUTO: 207 10*3/MM3 (ref 140–450)
PMV BLD AUTO: 10.3 FL (ref 6–12)
PMV BLD AUTO: 10.3 FL (ref 6–12)
PO2 BLDA: 279 MMHG (ref 80–105)
PO2 BLDA: 303 MMHG (ref 80–105)
PO2 BLDA: 332 MMHG (ref 80–105)
PO2 BLDA: 337 MMHG (ref 80–105)
PO2 BLDA: 342 MMHG (ref 80–105)
PO2 BLDA: 375 MMHG (ref 80–105)
PO2 BLDA: 392 MMHG (ref 80–105)
PO2 BLDA: 422 MMHG (ref 80–105)
PO2 BLDA: 46 MMHG (ref 80–105)
PO2 BLDA: 93 MMHG (ref 80–105)
POTASSIUM BLDA-SCNC: 4.6 MMOL/L (ref 3.5–4.9)
POTASSIUM BLDA-SCNC: 5 MMOL/L (ref 3.5–4.9)
POTASSIUM BLDA-SCNC: 5.3 MMOL/L (ref 3.5–4.9)
POTASSIUM BLDA-SCNC: 5.7 MMOL/L (ref 3.5–4.9)
POTASSIUM BLDA-SCNC: 6.2 MMOL/L (ref 3.5–4.9)
POTASSIUM BLDA-SCNC: 6.3 MMOL/L (ref 3.5–4.9)
POTASSIUM BLDA-SCNC: 7 MMOL/L (ref 3.5–4.9)
POTASSIUM BLDA-SCNC: 7.4 MMOL/L (ref 3.5–4.9)
POTASSIUM BLDA-SCNC: 7.6 MMOL/L (ref 3.5–4.9)
POTASSIUM BLDA-SCNC: 7.6 MMOL/L (ref 3.5–4.9)
POTASSIUM SERPL-SCNC: 4.3 MMOL/L (ref 3.5–5.2)
PROTHROMBIN TIME: 15.1 SECONDS (ref 11.5–14)
QT INTERVAL: 372 MS
QT INTERVAL: 460 MS
QTC INTERVAL: 436 MS
QTC INTERVAL: 447 MS
RBC # BLD AUTO: 3.39 10*6/MM3 (ref 4.14–5.8)
RBC # BLD AUTO: 3.39 10*6/MM3 (ref 4.14–5.8)
SAO2 % BLDA: 100 % (ref 95–98)
SAO2 % BLDA: 78 % (ref 95–98)
SAO2 % BLDA: 97 % (ref 95–98)
SODIUM BLD-SCNC: 135 MMOL/L (ref 138–146)
SODIUM BLD-SCNC: 136 MMOL/L (ref 138–146)
SODIUM BLD-SCNC: 136 MMOL/L (ref 138–146)
SODIUM BLD-SCNC: 137 MMOL/L (ref 138–146)
SODIUM BLD-SCNC: 139 MMOL/L (ref 138–146)
SODIUM BLD-SCNC: 140 MMOL/L (ref 138–146)
SODIUM BLD-SCNC: 141 MMOL/L (ref 138–146)
SODIUM BLD-SCNC: 142 MMOL/L (ref 138–146)
SODIUM SERPL-SCNC: 137 MMOL/L (ref 136–145)
UNIT  ABO: NORMAL
UNIT  RH: NORMAL
WBC # BLD AUTO: 13.21 10*3/MM3 (ref 3.4–10.8)
WBC # BLD AUTO: 14.29 10*3/MM3 (ref 3.4–10.8)

## 2021-03-04 PROCEDURE — 85610 PROTHROMBIN TIME: CPT | Performed by: PHYSICIAN ASSISTANT

## 2021-03-04 PROCEDURE — 71045 X-RAY EXAM CHEST 1 VIEW: CPT

## 2021-03-04 PROCEDURE — 85025 COMPLETE CBC W/AUTO DIFF WBC: CPT | Performed by: PHYSICIAN ASSISTANT

## 2021-03-04 PROCEDURE — 99232 SBSQ HOSP IP/OBS MODERATE 35: CPT | Performed by: PHYSICIAN ASSISTANT

## 2021-03-04 PROCEDURE — 99024 POSTOP FOLLOW-UP VISIT: CPT | Performed by: THORACIC SURGERY (CARDIOTHORACIC VASCULAR SURGERY)

## 2021-03-04 PROCEDURE — 82962 GLUCOSE BLOOD TEST: CPT

## 2021-03-04 PROCEDURE — 25010000002 MORPHINE PER 10 MG: Performed by: THORACIC SURGERY (CARDIOTHORACIC VASCULAR SURGERY)

## 2021-03-04 PROCEDURE — 97162 PT EVAL MOD COMPLEX 30 MIN: CPT

## 2021-03-04 PROCEDURE — 63710000001 INSULIN LISPRO (HUMAN) PER 5 UNITS: Performed by: INTERNAL MEDICINE

## 2021-03-04 PROCEDURE — 25010000003 MAGNESIUM SULFATE 4 GM/100ML SOLUTION: Performed by: NURSE PRACTITIONER

## 2021-03-04 PROCEDURE — 93005 ELECTROCARDIOGRAM TRACING: CPT | Performed by: PHYSICIAN ASSISTANT

## 2021-03-04 PROCEDURE — 99233 SBSQ HOSP IP/OBS HIGH 50: CPT | Performed by: INTERNAL MEDICINE

## 2021-03-04 PROCEDURE — 25010000003 CEFUROXIME SODIUM 1.5 G RECONSTITUTED SOLUTION: Performed by: PHYSICIAN ASSISTANT

## 2021-03-04 RX ORDER — MAGNESIUM SULFATE HEPTAHYDRATE 40 MG/ML
4 INJECTION, SOLUTION INTRAVENOUS AS NEEDED
Status: DISCONTINUED | OUTPATIENT
Start: 2021-03-04 | End: 2021-03-09 | Stop reason: HOSPADM

## 2021-03-04 RX ORDER — NICOTINE POLACRILEX 4 MG
15 LOZENGE BUCCAL
Status: DISCONTINUED | OUTPATIENT
Start: 2021-03-04 | End: 2021-03-08

## 2021-03-04 RX ORDER — DEXTROSE MONOHYDRATE 25 G/50ML
25 INJECTION, SOLUTION INTRAVENOUS
Status: DISCONTINUED | OUTPATIENT
Start: 2021-03-04 | End: 2021-03-08

## 2021-03-04 RX ORDER — AMOXICILLIN 250 MG
2 CAPSULE ORAL 2 TIMES DAILY
Status: DISCONTINUED | OUTPATIENT
Start: 2021-03-04 | End: 2021-03-09 | Stop reason: HOSPADM

## 2021-03-04 RX ORDER — MAGNESIUM SULFATE HEPTAHYDRATE 40 MG/ML
2 INJECTION, SOLUTION INTRAVENOUS AS NEEDED
Status: DISCONTINUED | OUTPATIENT
Start: 2021-03-04 | End: 2021-03-09 | Stop reason: HOSPADM

## 2021-03-04 RX ADMIN — MORPHINE SULFATE 2 MG: 2 INJECTION, SOLUTION INTRAMUSCULAR; INTRAVENOUS at 05:07

## 2021-03-04 RX ADMIN — CEFUROXIME SODIUM 1.5 G: 1.5 INJECTION, POWDER, FOR SOLUTION INTRAVENOUS at 23:50

## 2021-03-04 RX ADMIN — ASPIRIN 325 MG: 325 TABLET, COATED ORAL at 08:19

## 2021-03-04 RX ADMIN — HYDROCODONE BITARTRATE AND ACETAMINOPHEN 1 TABLET: 7.5; 325 TABLET ORAL at 22:31

## 2021-03-04 RX ADMIN — INSULIN LISPRO 3 UNITS: 100 INJECTION, SOLUTION INTRAVENOUS; SUBCUTANEOUS at 17:08

## 2021-03-04 RX ADMIN — MAGNESIUM SULFATE HEPTAHYDRATE 4 G: 40 INJECTION, SOLUTION INTRAVENOUS at 01:34

## 2021-03-04 RX ADMIN — OXYCODONE AND ACETAMINOPHEN 2 TABLET: 5; 325 TABLET ORAL at 20:07

## 2021-03-04 RX ADMIN — OXYCODONE AND ACETAMINOPHEN 2 TABLET: 5; 325 TABLET ORAL at 03:55

## 2021-03-04 RX ADMIN — MORPHINE SULFATE 2 MG: 2 INJECTION, SOLUTION INTRAMUSCULAR; INTRAVENOUS at 23:33

## 2021-03-04 RX ADMIN — OXYCODONE AND ACETAMINOPHEN 2 TABLET: 5; 325 TABLET ORAL at 08:19

## 2021-03-04 RX ADMIN — ATORVASTATIN CALCIUM 80 MG: 40 TABLET, FILM COATED ORAL at 20:07

## 2021-03-04 RX ADMIN — OXYCODONE AND ACETAMINOPHEN 2 TABLET: 5; 325 TABLET ORAL at 15:05

## 2021-03-04 RX ADMIN — MORPHINE SULFATE 2 MG: 2 INJECTION, SOLUTION INTRAMUSCULAR; INTRAVENOUS at 20:07

## 2021-03-04 RX ADMIN — INSULIN HUMAN 5.6 UNITS/HR: 1 INJECTION, SOLUTION INTRAVENOUS at 10:19

## 2021-03-04 RX ADMIN — DOCUSATE SODIUM 50MG AND SENNOSIDES 8.6MG 2 TABLET: 8.6; 5 TABLET, FILM COATED ORAL at 20:07

## 2021-03-04 RX ADMIN — MORPHINE SULFATE 2 MG: 2 INJECTION, SOLUTION INTRAMUSCULAR; INTRAVENOUS at 10:23

## 2021-03-04 RX ADMIN — CEFUROXIME SODIUM 1.5 G: 1.5 INJECTION, POWDER, FOR SOLUTION INTRAVENOUS at 15:52

## 2021-03-04 RX ADMIN — CEFUROXIME SODIUM 1.5 G: 1.5 INJECTION, POWDER, FOR SOLUTION INTRAVENOUS at 08:19

## 2021-03-04 NOTE — PLAN OF CARE
Problem: Adult Inpatient Plan of Care  Goal: Plan of Care Review  Recent Flowsheet Documentation  Taken 3/4/2021 1137 by Cecilia Hughes PT  Progress: improving  Plan of Care Reviewed With: patient  Outcome Summary: PT eval is completed. patient presents S/P AVR and AAA repair with MAZE procedure. patient has impaired bed mobility transfers and gait as well as decreased strength and standing balance. patient was able to stand with mod assist and ambulated 110 ft with min assist of 2. anticipate patient to be able to go home with family at D/C   Goal Outcome Evaluation:  Plan of Care Reviewed With: patient  Progress: improving  Outcome Summary: PT eval is completed. patient presents S/P AVR and AAA repair with MAZE procedure. patient has impaired bed mobility transfers and gait as well as decreased strength and standing balance. patient was able to stand with mod assist and ambulated 110 ft with min assist of 2. anticipate patient to be able to go home with family at D/C

## 2021-03-04 NOTE — PROGRESS NOTES
CTS Progress Note       LOS: 1 day   Patient Care Team:  Kreis, Samuel Duane, MD as PCP - General (Family Medicine)  Mabel Flores MD as Consulting Physician (Cardiology)    Chief Complaint: Thoracic aortic aneurysm without rupture (CMS/HCC)    Vital Signs:  Temp:  [97.2 °F (36.2 °C)-99.9 °F (37.7 °C)] 99.3 °F (37.4 °C)  Heart Rate:  [53-81] 80  Resp:  [15-20] 18  BP: ()/(44-85) 108/69  Arterial Line BP: ()/(52-88) 115/63  FiO2 (%):  [35 %-100 %] 35 %    Physical Exam: Extubated.  Sitting up in chair breathing unlabored no neurologic deficits       Results:   Results from last 7 days   Lab Units 03/04/21  0530   WBC 10*3/mm3 13.21*   HEMOGLOBIN g/dL 10.3*   HEMATOCRIT % 31.9*   PLATELETS 10*3/mm3 197     Results from last 7 days   Lab Units 03/03/21  2353   SODIUM mmol/L 137   POTASSIUM mmol/L 4.3   CHLORIDE mmol/L 101   CO2 mmol/L 27.0   BUN mg/dL 25*   CREATININE mg/dL 1.85*   GLUCOSE mg/dL 148*   CALCIUM mg/dL 9.7           Imaging Results (Last 24 Hours)     Procedure Component Value Units Date/Time    XR Chest 1 View [[id number]] Resulted: 03/04/21 0446     Updated: 03/04/21 0450    XR Chest 1 View [[id number]] Collected: 03/03/21 1257     Updated: 03/03/21 1313    Narrative:      EXAMINATION: XR CHEST 1 VW-      INDICATION: Post-Op Check Line & Tube Placement; I71.2-Thoracic aortic  aneurysm, without rupture; Q23.1-Congenital insufficiency of aortic  valve      COMPARISON: 3/1/2021     FINDINGS: Endotracheal tube in good position above the lyubov. Pulmonary  artery catheter in place via right IJ approach. Median sternotomy wires  appear intact. Mediastinal and left pleural drains noted in place. There  is increased pulmonary vascular engorgement, without significant  effusion. No distinct pneumothorax. Unchanged degree of cardiac  enlargement.       Impression:      Endotracheal tube in good position above the lyubov.  Pulmonary artery catheter in place via right IJ approach. Median  sternotomy  wires appear intact. Mediastinal and left pleural drains  noted in place. There is increased pulmonary vascular engorgement,  without significant effusion. No distinct pneumothorax. Unchanged degree  of cardiac enlargement.     This report was finalized on 3/3/2021 12:58 PM by Bruno Milian.             Assessment      Acending thoracic aortic aneurysm (Repair/replace w/ woven Dacron graft size 30 mm 3/3/21)    New onset atrial fibrillation (S/p Maze w/ Pulmonary Vein Ablation 3/3/21))    Former smoker    COVID-19 virus (12/2020)    Bicuspid aortic valve (S/P 27 trifecta bioprosthetic tissue valve 3/3/21)    Cardiomyopathy (EF <25%)    Currently still ventricular paced with bradycardia underlying.  Extubated no neurologic deficits.  Serum creatinine has increased slightly but not unexpectedly    Plan   Discontinue [place]-Zain catheter.  Continue to keep blood pressure less than 120 systolic for next 24 hours.    Please note that portions of this note were completed with a voice recognition program. Efforts were made to edit the dictations, but occasionally words are mistranscribed.    Evgeny Guerrier MD  03/04/21  06:17 EST

## 2021-03-04 NOTE — PLAN OF CARE
Goal Outcome Evaluation:  Plan of Care Reviewed With: patient  Progress: improving  Outcome Summary: Pt intact and ORx4 and appropiate. Pt still weak but has improved throughout the night. Pt still on 4L NC >94%. PT v-paced @80 through through the night, underlying rhythm is SBrady in the 50's with PVC's. Pt BP controlled with Levophed and Epi low dose, still titrating, keeping pressure <120 systolic. Pt CI: 2.1-2.9. Pt had 1565ml of urine output. Pt doing well, up in chair this morning. still titrating levo, epi, insulin. Mag replaced. PT VSS will continue to monitor.

## 2021-03-04 NOTE — PROGRESS NOTES
Discharge Planning Assessment  [facility]     Patient Name: Yeison Bryant  MRN: [id number]  Today's Date: 3/4/2021    Admit Date: 3/3/2021    Discharge Needs Assessment     Row Name 03/04/21 1149       Living Environment    Lives With  spouse    Name(s) of Who Lives With Patient  Spouse, Nicky rByant    Current Living Arrangements  home/apartment/condo    Primary Care Provided by  self    Provides Primary Care For  no one    Family Caregiver if Needed  spouse    Family Caregiver Names  Spouse, Nicky Bryant    Quality of Family Relationships  supportive;involved;helpful    Able to Return to Prior Arrangements  other (see comments) TBD       Resource/Environmental Concerns    Resource/Environmental Concerns  none    Transportation Concerns  car, none       Transition Planning    Patient/Family Anticipates Transition to  home    Patient/Family Anticipated Services at Transition  none    Transportation Anticipated  family or friend will provide       Discharge Needs Assessment    Readmission Within the Last 30 Days  no previous admission in last 30 days    Equipment Currently Used at Home  other (see comments) BP cuff    Concerns to be Addressed  discharge planning    Anticipated Changes Related to Illness  none    Equipment Needed After Discharge  none    Provided Post Acute Provider List?  N/A    N/A Provider List Comment  Undetermined need for post discharge services        Discharge Plan     Row Name 03/04/21 1153       Plan    Plan  TBD    Patient/Family in Agreement with Plan  yes    Plan Comments  Met with patient and son, Marcial, in the room.  Patient had Median Sternotomy, AVR, MAZE, AAA repair and Left femoral artery cannulation.  EF less than 25%.  COVID-19 in December.  His PCP is Carlo Woods.  He has a BP cuff. Lives with spouse in [place] in house with 1 step at entrance.  Bed and bath on first floor.  Plan is being developed and will depend on how he progresses with therapy.  Following    Final  Discharge Disposition Code  30 - still a patient        Continued Care and Services - Admitted Since 3/3/2021    Coordination has not been started for this encounter.         Demographic Summary     Row Name 03/04/21 1148       General Information    Admission Type  inpatient    Arrived From  operating room    Referral Source  admission list;physician    Reason for Consult  discharge planning    Preferred Language  English        Functional Status     Row Name 03/04/21 1148       Functional Status    Usual Activity Tolerance  excellent    Current Activity Tolerance  fair       Functional Status, IADL    Medications  independent    Meal Preparation  independent    Housekeeping  independent    Laundry  independent    Shopping  independent        Psychosocial    No documentation.       Abuse/Neglect    No documentation.       Legal    No documentation.       Substance Abuse    No documentation.       Patient Forms    No documentation.           Lois Potts RN

## 2021-03-04 NOTE — PLAN OF CARE
Goal Outcome Evaluation:         Neuro- Intact, MITCHELL, numbness in R hand, physician notified and discussed with pt. Respiratory- sats >90%, 4L via NC, clear to diminished lung sounds. Cardiac- V Paced at 80 bpm, sinus carmita in 50s underlying, SBP , Epi drip at 0.02, audible rub, 6 beat run of Vtach at 1637. GI- normoactive bowel sounds, tolerates diet. - UOP adequate for shift. [place] and radial lines D/C'd at 1000. Incisions clean, dry, intact. Insulin drip D/Cd at 1200.

## 2021-03-04 NOTE — PROGRESS NOTES
"  [facility]  PROGRESS NOTE    Date of Admission: 3/3/2021  Date of Service: 03/04/21    Primary Care Physician: Kreis, Samuel Duane, MD    Chief Complaint: f/u PAF, HTN, HLD   Problem List:   1. Ascending aortic aneurysm  a. 5.6cm on CT chest   b. Replacement and repair of AAA with 30mm Dacron graft 3/3/2021, Dr. Guerrier  2. Low flow- low gradient Aortic stenosis  a. Right and LHC 1/25/21: severe global hypokinesia and EF 20%, mod-severe PAH with elevated LVEDP; cardiac output low at 4.9L/min, normal coronaries, dilated aortic root   b. DIONISIO 1/26/21: EF 21-25%, bicuspid aortic valve with mild AS, moderate functional MR, severe dilation of ascending aorta measuring 5.7cm, RV is mildly dilated   c. Dobutamine stress echo 1/27/21: Best diagnosis is low flow, low gradient aortic stenosis.  d. AV Replacement 27 trifecta bioprosthetic tissue valve, 3/3/2021 Dr. Guerrier  3. Atrial fibrillation   a. Diagnosed 1/18/21 at time of hospitalization in [place]  b. CHADsVASC=2  c. Echo 1/19/21: EF 56-60%, LA is mildly increased, RVSP 42mmHg, moderate dilation of the aortic root   d. MAZE procedure with ablation of left/right superior and inferior pulmonary veins.  Unable to clip CHARLOTTE as DIONISIO demonstrated a clot within the appendage.  4. Covid 19 infection 12/27/20  5. History of tobacco abuse, inactive 2008  6. Probable ALESSANDRO   7. Obesity    Subjective      Patient awake, sitting in chair, son at bedside. He complains of numbness and limited range of motion in right 4th and 5th digit. Otherwise denies specific complaints.     Objective   Vitals: /69   Pulse 80   Temp 99.2 °F (37.3 °C) (Core)   Resp 16   Ht 188 cm (74\")   Wt 126 kg (277 lb 12.8 oz)   SpO2 98%   BMI 35.67 kg/m²     Physical Exam:  GENERAL: Alert, cooperative, in no acute distress.   HEENT: Normocephalic, no jugular venous distention  HEART: Regular rhythm, normal rate, and no murmurs, gallops, or rubs.   LUNGS:  No " "wheezing, rales or rhonchi anteriorly. On 5L NC   ABDOMEN: Soft, bowel sounds present, non-tender   NEUROLOGIC: No gross focal deficits; right hand \"numbness\" involving (lateral) 4th and the 5th digit. No motor issues and capillary filling good.   I note that right radial line was just removed a few minutes before I saw him.   EXTREMITIES: No clubbing, cyanosis, or edema noted.     Results:  Results from last 7 days   Lab Units 03/04/21  0530 03/03/21 2353 03/03/21 2027   WBC 10*3/mm3 13.21* 14.29* 13.91*   HEMOGLOBIN g/dL 10.3* 10.5* 10.6*   HEMATOCRIT % 31.9* 32.2* 33.3*   PLATELETS 10*3/mm3 197 207 195     Results from last 7 days   Lab Units 03/03/21  2353 03/03/21  2027 03/03/21  1546   SODIUM mmol/L 137 138 138   POTASSIUM mmol/L 4.3 4.3 4.2   CHLORIDE mmol/L 101 102 104   CO2 mmol/L 27.0 26.0 24.0   BUN mg/dL 25* 24* 22   CREATININE mg/dL 1.85* 1.81* 1.61*   GLUCOSE mg/dL 148* 131* 177*      Lab Results   Component Value Date    CHOL 181 01/19/2021    TRIG 124 01/19/2021    HDL 56 01/19/2021     (H) 01/19/2021    AST 23 03/01/2021    ALT 24 03/01/2021     Results from last 7 days   Lab Units 03/01/21  1608   HEMOGLOBIN A1C % 5.90*       Results from last 7 days   Lab Units 03/03/21  1224 03/01/21  1608   PROTIME Seconds 13.0 12.7   INR  1.01 0.98   APTT seconds 36.1 32.5       Intake/Output Summary (Last 24 hours) at 3/4/2021 0913  Last data filed at 3/4/2021 0600  Gross per 24 hour   Intake 4643 ml   Output 3590 ml   Net 1053 ml       I personally reviewed the patient's EKG/Telemetry data    Radiology Data:   CXR 3/4/21:  IMPRESSION:  Interval extubation and removal of nasogastric tube.     Trace left apical pneumothorax. Remainder of exam unchanged.    Current Medications:  aspirin, 325 mg, Oral, Daily  atorvastatin, 80 mg, Oral, Nightly  cefuroxime, 1.5 g, Intravenous, Q8H  metoprolol tartrate, 12.5 mg, Oral, Q12H      EPINEPHrine, 0.02-0.3 mcg/kg/min, Last Rate: 0.03 mcg/kg/min (03/04/21 " 0000)  insulin, 0-50 Units/hr, Last Rate: 4.9 Units/hr (03/04/21 0819)  niCARdipine, 5-15 mg/hr  nitroglycerin, 5-200 mcg/min, Last Rate: Stopped (03/03/21 2030)  norepinephrine, 0.02-0.3 mcg/kg/min, Last Rate: 0.02 mcg/kg/min (03/03/21 1530)        Assessment and Plan:     1. Low Flow, Low Gradient Severe Aortic Stenosis  -  EF by DIONISIO 1/26/21 21-25%.  Bicuspid aortic valve.  Normal coronaries by [facility]  -  POD1 Aortic Valve Replacement using #27 trifecta bioprosthetic tissue valve, with repair of ascending aneurysm using 30mm Dacron graft, Dr. Guerrier.  Per CTS.    2. PAF  - CHADsVASC=2, on Eliquis prior to surgery  - s/o MAZE procedure; could not deliver CHARLOTTE clip as thrombus seen in CHARLOTTE by DIONISIO intra-op   - needs anticoagulation when OK with CTS     3. HARRIS   - per intensivists    4. Ulnar neuropathy    - Observation only for today.        Electronically signed by Eleanor Novak PA-C, 03/04/21, 9:20 AM EST.

## 2021-03-04 NOTE — PROGRESS NOTES
Intensive Care Follow-up      LOS: 1 day     Mr. Yeison Bryant, 65 y.o. male is followed for: Glycemic, Electrolyte, Respiratory, and Medical management     Subjective - Interval History     65-year-old white male with a history of tobacco abuse, valvular heart disease, cardiomyopathy with ejection fraction less than 25%, and atrial fibrillation underwent aortic valve replacement with a #27 trifecta bioprosthetic tissue valve, replacement/repair of ascending aortic aneurysm with woven dacryon graft size 30 mm, Maze procedure with ablation of bilateral pulmonary veins on 3/3/2021.     He has been moved to the intensive care unit postoperatively.  Currently mechanically ventilated and sedated on propofol. On nitroglycerin, epinephrine, and norepinephrine drips.     Patient has a history of tobacco abuse, resolved in 2008    Interval history    Extubated through the night without difficulty  Remains on epinephrine, norepinephrine and insulin drips  Blood sugars under good control  Awake and alert and up in a chair  Oxygenating adequately on 3-4 L/min via nasal cannula      The patient's relevant past medical, surgical and social history were reviewed and updated in Epic as appropriate.     Objective     Infusions:  EPINEPHrine, 0.02-0.3 mcg/kg/min, Last Rate: 0.03 mcg/kg/min (03/04/21 0000)  insulin, 0-50 Units/hr, Last Rate: 5.6 Units/hr (03/04/21 0600)  niCARdipine, 5-15 mg/hr  nitroglycerin, 5-200 mcg/min, Last Rate: Stopped (03/03/21 2030)  norepinephrine, 0.02-0.3 mcg/kg/min, Last Rate: 0.02 mcg/kg/min (03/03/21 1530)      Medications:  aspirin, 325 mg, Oral, Daily  atorvastatin, 80 mg, Oral, Nightly  cefuroxime, 1.5 g, Intravenous, Q8H  metoprolol tartrate, 12.5 mg, Oral, Q12H      Intake/Output       03/03/21 0700 - 03/04/21 0659    Intake (ml) 4643    Output (ml) 3690    Net (ml) 953        Vital Sign Min/Max for last 24 hours  Temp  Min: 97.2 °F (36.2 °C)  Max: 99.9 °F (37.7 °C)   BP  Min: 80/59  Max:  121/85   Pulse  Min: 53  Max: 81   Resp  Min: 15  Max: 20   SpO2  Min: 94 %  Max: 100 %   No data recorded        Physical Exam:   GENERAL: Awake, no distress   HEENT: Right IJ introducer site okay, no adenopathy   LUNGS: No wheezes or rhonchi   HEART: Regular rhythm.  Sternal incision without drainage or dehiscence.  MTs without air leak   GI: Soft, nontender   EXTREMITIES: No clubbing, cyanosis, or edema   NEURO/PSYCH: Awake, alert, appropriate    Results from last 7 days   Lab Units 03/04/21  0530 03/03/21 2353 03/03/21 2027   WBC 10*3/mm3 13.21* 14.29* 13.91*   HEMOGLOBIN g/dL 10.3* 10.5* 10.6*   PLATELETS 10*3/mm3 197 207 195     Results from last 7 days   Lab Units 03/03/21 2353 03/03/21 2027 03/03/21  1546   SODIUM mmol/L 137 138 138   POTASSIUM mmol/L 4.3 4.3 4.2   CO2 mmol/L 27.0 26.0 24.0   BUN mg/dL 25* 24* 22   CREATININE mg/dL 1.85* 1.81* 1.61*   MAGNESIUM mg/dL 1.7 1.8 1.7   PHOSPHORUS mg/dL 4.0 3.6 2.9   GLUCOSE mg/dL 148* 131* 177*     Estimated Creatinine Clearance: 56.9 mL/min (A) (by C-G formula based on SCr of 1.85 mg/dL (H)).    Results from last 7 days   Lab Units 03/01/21  1608   HEMOGLOBIN A1C % 5.90*       Results from last 7 days   Lab Units 03/03/21  1716   PH, ARTERIAL pH units 7.367   PCO2, ARTERIAL mm Hg 45.6*   PO2 ART mm Hg 82.5*     No results found for: LACTATE       Images: Chest x-ray reveals no consolidation or effusions.  Postoperative changeswith tubes in good position    I reviewed the patient's results and images.     Impression      Active Hospital Problems    Diagnosis   • **Acending thoracic aortic aneurysm (Repair/replace w/ woven Dacron graft size 30 mm 3/3/21)   • Bicuspid aortic valve (S/P 27 trifecta bioprosthetic tissue valve 3/3/21)   • New onset atrial fibrillation (S/p Maze w/ Pulmonary Vein Ablation 3/3/21))   • Cardiomyopathy (EF <25%)   • COVID-19 virus (12/2020)   • Former smoker         Plan        Continue insulin drip and transition later on  today  Follow-up electrolytes and replace as necessary  Mobilize  Follow-up renal function  Wean vasopressor support as able     Plan of care and goals reviewed with Multidisciplinary Team and Antibiotic Stewardship rounds   I discussed the patient's findings and my recommendations with patient and nursing staff      High level of risk due to:  illness with threat to life or bodily function, parenteral controlled substances and drugs requiring intensive monitoring for toxicity.     JAX Christiansen MD  Pulmonary and Critical Care Medicine

## 2021-03-04 NOTE — THERAPY EVALUATION
Acute Care - Physical Therapy Initial Evaluation  [facility]     Patient Name: Yeison Bryant  : 1955  MRN: [id number]  Today's Date: 3/4/2021           PT Assessment (last 12 hours)      PT Evaluation and Treatment    No documentation.       Physical Therapy Education                 Title: PT OT SLP Therapies (In Progress)     Topic: Physical Therapy (In Progress)     Point: Mobility training (In Progress)     Learning Progress Summary           Patient Acceptance, E, NR by JONES at 3/4/2021 1030                   Point: Home exercise program (In Progress)     Learning Progress Summary           Patient Acceptance, E, NR by JONES at 3/4/2021 1030                   Point: Body mechanics (In Progress)     Learning Progress Summary           Patient Acceptance, E, NR by JONES at 3/4/2021 1030                   Point: Precautions (In Progress)     Learning Progress Summary           Patient Acceptance, E, NR by JONES at 3/4/2021 1030                               User Key     Initials Effective Dates Name Provider Type Discipline     06/19/15 -  Cecilia Hughes, PT Physical Therapist PT              PT Recommendation and Plan  Anticipated Discharge Disposition (PT): home with assist  Planned Therapy Interventions (PT): balance training, bed mobility training, gait training, home exercise program, transfer training, strengthening  Therapy Frequency (PT): daily  Plan of Care Reviewed With: patient  Progress: improving  Outcome Summary: PT eval is completed. patient presents S/P AVR and AAA repair with MAZE procedure. patient has impaired bed mobility transfers and gait as well as decreased strength and standing balance. patient was able to stand with mod assist and ambulated 110 ft with min assist of 2. anticipate patient to be able to go home with family at D/C       Time Calculation:   PT Charges     Row Name 21 1144             Time Calculation    Start Time  1030  -JONES      PT Received On  21  -JONES       PT Goal Re-Cert Due Date  03/14/21  -JONES        User Key  (r) = Recorded By, (t) = Taken By, (c) = Cosigned By    Initials Name Provider Type    Cecilia Denise PT Physical Therapist        Therapy Charges for Today     Code Description Service Date Service Provider Modifiers Qty    94465968364 HC PT EVAL MOD COMPLEXITY 4 3/4/2021 Cecilia Hughes PT GP 1          PT G-Codes  Outcome Measure Options: AM-PAC 6 Clicks Basic Mobility (PT)  AM-PAC 6 Clicks Score (PT): 16    Cecilia Hughes, PT  3/4/2021

## 2021-03-04 NOTE — PROGRESS NOTES
Pt. Referred for Phase II Cardiac Rehab. Staff discussed benefits of exercise, program protocol, and educational material provided. Teach back verified.  Permission granted from patient for staff to fax referral information to outlying program at this time.  Staff faxed referral info to [facility].

## 2021-03-05 ENCOUNTER — APPOINTMENT (OUTPATIENT)
Dept: GENERAL RADIOLOGY | Facility: HOSPITAL | Age: 66
End: 2021-03-05

## 2021-03-05 LAB
ANION GAP SERPL CALCULATED.3IONS-SCNC: 8 MMOL/L (ref 5–15)
BUN SERPL-MCNC: 22 MG/DL (ref 8–23)
BUN/CREAT SERPL: 20 (ref 7–25)
CALCIUM SPEC-SCNC: 9 MG/DL (ref 8.6–10.5)
CHLORIDE SERPL-SCNC: 96 MMOL/L (ref 98–107)
CO2 SERPL-SCNC: 28 MMOL/L (ref 22–29)
CREAT SERPL-MCNC: 1.1 MG/DL (ref 0.76–1.27)
DEPRECATED RDW RBC AUTO: 47.8 FL (ref 37–54)
ERYTHROCYTE [DISTWIDTH] IN BLOOD BY AUTOMATED COUNT: 13.3 % (ref 12.3–15.4)
GFR SERPL CREATININE-BSD FRML MDRD: 67 ML/MIN/1.73
GLUCOSE BLDC GLUCOMTR-MCNC: 126 MG/DL (ref 70–130)
GLUCOSE BLDC GLUCOMTR-MCNC: 149 MG/DL (ref 70–130)
GLUCOSE BLDC GLUCOMTR-MCNC: 157 MG/DL (ref 70–130)
GLUCOSE BLDC GLUCOMTR-MCNC: 260 MG/DL (ref 70–130)
GLUCOSE SERPL-MCNC: 176 MG/DL (ref 65–99)
HCT VFR BLD AUTO: 32.6 % (ref 37.5–51)
HGB BLD-MCNC: 10.4 G/DL (ref 13–17.7)
MAGNESIUM SERPL-MCNC: 2.2 MG/DL (ref 1.6–2.4)
MCH RBC QN AUTO: 30.9 PG (ref 26.6–33)
MCHC RBC AUTO-ENTMCNC: 31.9 G/DL (ref 31.5–35.7)
MCV RBC AUTO: 96.7 FL (ref 79–97)
PLATELET # BLD AUTO: 151 10*3/MM3 (ref 140–450)
PMV BLD AUTO: 10.1 FL (ref 6–12)
POTASSIUM SERPL-SCNC: 5.1 MMOL/L (ref 3.5–5.2)
QT INTERVAL: 408 MS
QTC INTERVAL: 433 MS
RBC # BLD AUTO: 3.37 10*6/MM3 (ref 4.14–5.8)
SODIUM SERPL-SCNC: 132 MMOL/L (ref 136–145)
WBC # BLD AUTO: 16.68 10*3/MM3 (ref 3.4–10.8)

## 2021-03-05 PROCEDURE — 97530 THERAPEUTIC ACTIVITIES: CPT

## 2021-03-05 PROCEDURE — 71045 X-RAY EXAM CHEST 1 VIEW: CPT

## 2021-03-05 PROCEDURE — 80048 BASIC METABOLIC PNL TOTAL CA: CPT | Performed by: PHYSICIAN ASSISTANT

## 2021-03-05 PROCEDURE — 82962 GLUCOSE BLOOD TEST: CPT

## 2021-03-05 PROCEDURE — 99024 POSTOP FOLLOW-UP VISIT: CPT | Performed by: THORACIC SURGERY (CARDIOTHORACIC VASCULAR SURGERY)

## 2021-03-05 PROCEDURE — 85027 COMPLETE CBC AUTOMATED: CPT | Performed by: PHYSICIAN ASSISTANT

## 2021-03-05 PROCEDURE — 93005 ELECTROCARDIOGRAM TRACING: CPT | Performed by: PHYSICIAN ASSISTANT

## 2021-03-05 PROCEDURE — 25010000002 ONDANSETRON PER 1 MG: Performed by: PHYSICIAN ASSISTANT

## 2021-03-05 PROCEDURE — 25010000002 KETOROLAC TROMETHAMINE PER 15 MG: Performed by: INTERNAL MEDICINE

## 2021-03-05 PROCEDURE — 99233 SBSQ HOSP IP/OBS HIGH 50: CPT | Performed by: INTERNAL MEDICINE

## 2021-03-05 PROCEDURE — 83735 ASSAY OF MAGNESIUM: CPT | Performed by: THORACIC SURGERY (CARDIOTHORACIC VASCULAR SURGERY)

## 2021-03-05 PROCEDURE — 25010000002 FUROSEMIDE PER 20 MG: Performed by: PHYSICIAN ASSISTANT

## 2021-03-05 PROCEDURE — 63710000001 INSULIN LISPRO (HUMAN) PER 5 UNITS: Performed by: INTERNAL MEDICINE

## 2021-03-05 PROCEDURE — 99232 SBSQ HOSP IP/OBS MODERATE 35: CPT | Performed by: PHYSICIAN ASSISTANT

## 2021-03-05 RX ORDER — KETOROLAC TROMETHAMINE 15 MG/ML
15 INJECTION, SOLUTION INTRAMUSCULAR; INTRAVENOUS EVERY 6 HOURS PRN
Status: DISPENSED | OUTPATIENT
Start: 2021-03-05 | End: 2021-03-06

## 2021-03-05 RX ORDER — FUROSEMIDE 10 MG/ML
40 INJECTION INTRAMUSCULAR; INTRAVENOUS ONCE
Status: COMPLETED | OUTPATIENT
Start: 2021-03-05 | End: 2021-03-05

## 2021-03-05 RX ORDER — PANTOPRAZOLE SODIUM 40 MG/1
40 TABLET, DELAYED RELEASE ORAL
Status: DISCONTINUED | OUTPATIENT
Start: 2021-03-05 | End: 2021-03-09 | Stop reason: HOSPADM

## 2021-03-05 RX ADMIN — ASPIRIN 325 MG: 325 TABLET, COATED ORAL at 09:00

## 2021-03-05 RX ADMIN — FUROSEMIDE 40 MG: 10 INJECTION, SOLUTION INTRAMUSCULAR; INTRAVENOUS at 09:00

## 2021-03-05 RX ADMIN — DOCUSATE SODIUM 50MG AND SENNOSIDES 8.6MG 2 TABLET: 8.6; 5 TABLET, FILM COATED ORAL at 09:00

## 2021-03-05 RX ADMIN — DOCUSATE SODIUM 50MG AND SENNOSIDES 8.6MG 2 TABLET: 8.6; 5 TABLET, FILM COATED ORAL at 20:40

## 2021-03-05 RX ADMIN — ATORVASTATIN CALCIUM 80 MG: 40 TABLET, FILM COATED ORAL at 20:40

## 2021-03-05 RX ADMIN — PANTOPRAZOLE SODIUM 40 MG: 40 TABLET, DELAYED RELEASE ORAL at 11:45

## 2021-03-05 RX ADMIN — INSULIN LISPRO 3 UNITS: 100 INJECTION, SOLUTION INTRAVENOUS; SUBCUTANEOUS at 08:59

## 2021-03-05 RX ADMIN — ONDANSETRON 4 MG: 2 INJECTION INTRAMUSCULAR; INTRAVENOUS at 09:01

## 2021-03-05 RX ADMIN — METOPROLOL TARTRATE 12.5 MG: 25 TABLET, FILM COATED ORAL at 20:40

## 2021-03-05 RX ADMIN — KETOROLAC TROMETHAMINE 15 MG: 15 INJECTION, SOLUTION INTRAMUSCULAR; INTRAVENOUS at 17:59

## 2021-03-05 RX ADMIN — OXYCODONE AND ACETAMINOPHEN 2 TABLET: 5; 325 TABLET ORAL at 05:01

## 2021-03-05 RX ADMIN — KETOROLAC TROMETHAMINE 15 MG: 15 INJECTION, SOLUTION INTRAMUSCULAR; INTRAVENOUS at 11:43

## 2021-03-05 NOTE — THERAPY TREATMENT NOTE
Patient Name: Yeison Bryant  : 1955    MRN: [id number]                              Today's Date: 3/5/2021       Admit Date: 3/3/2021    Visit Dx:     ICD-10-CM ICD-9-CM   1. Thoracic aortic aneurysm without rupture (CMS/[facility])  I71.2 441.2   2. Bicuspid aortic valve  Q23.1 746.4     Patient Active Problem List   Diagnosis   • CHF (congestive heart failure) (CMS/[facility])   • New onset atrial fibrillation (S/p Maze w/ Pulmonary Vein Ablation 3/3/21))   • Former smoker   • COVID-19 virus (2020)   • Acending thoracic aortic aneurysm (Repair/replace w/ woven Dacron graft size 30 mm 3/3/21)   • Bicuspid aortic valve (S/P 27 trifecta bioprosthetic tissue valve 3/3/21)   • Cardiomyopathy (EF <25%)     Past Medical History:   Diagnosis Date   • Arrhythmia    • Arthritis    • Atrial fibrillation (CMS/[facility])    • Cataract    • CHF (congestive heart failure) (CMS/[facility]) 2021   • COVID-19 2020   • Dyslipidemia    • Former smoker    • History of gout    • History of pneumonia    • Hypertension    • Obesity (BMI 30-39.9)    • SOB (shortness of breath)      Past Surgical History:   Procedure Laterality Date   • AORTIC VALVE REPAIR/REPLACEMENT N/A 3/3/2021    Procedure: MEDIAN STERNOTOMY, MAZE, AORTIC VALVE REPLACEMENT WITH LEFT FEMORAL ARTERIAL CANNULATION;  Surgeon: Evgeny Guerrier MD;  Location:  [facility];  Service: Cardiothoracic;  Laterality: N/A;   • CARDIAC CATHETERIZATION Left 2021    Procedure: Left Heart Cath - COVID+ ;  Surgeon: Mahendra Waldron MD;  Location:  [facility];  Service: Cardiology;  Laterality: Left;   • CARDIAC CATHETERIZATION N/A 2021    Procedure: Aortic root aortogram;  Surgeon: Mahendra Waldron MD;  Location:  [facility]ATION;  Service: Cardiology;  Laterality: N/A;   • CATARACT EXTRACTION     • COLONOSCOPY     • EYE SURGERY      lasic and cataract   • KNEE SURGERY Bilateral    • THORACIC AORTIC ANEURYSM REPAIR N/A 3/3/2021     Procedure: ASCENDING THORACIC AORTIC ANEURYSM REPAIR, DIONISIO PER ANESTHESIA;  Surgeon: Evgeny Guerrier MD;  Location: [facility];  Service: Cardiothoracic;  Laterality: N/A;   • TONSILLECTOMY       General Information     Row Name 03/05/21 1130          Physical Therapy Time and Intention    Document Type  therapy note (daily note)  -JONES     Mode of Treatment  physical therapy  -JONES     Row Name 03/05/21 1130          General Information    Patient Profile Reviewed  yes  -JONES     Prior Level of Function  independent:;gait;transfer;ADL's;bed mobility  -JONES     Existing Precautions/Restrictions  cardiac;fall;oxygen therapy device and L/min;sternal  -JONES     Barriers to Rehab  none identified  -JONES     Row Name 03/05/21 1130          Living Environment    Lives With  spouse  -     Row Name 03/05/21 1130          Cognition    Orientation Status (Cognition)  oriented x 4  -     Row Name 03/05/21 1130          Safety Issues, Functional Mobility    Safety Issues Affecting Function (Mobility)  safety precautions follow-through/compliance;insight into deficits/self-awareness;awareness of need for assistance;safety precaution awareness  -JONES     Impairments Affecting Function (Mobility)  balance;endurance/activity tolerance;shortness of breath;strength  -       User Key  (r) = Recorded By, (t) = Taken By, (c) = Cosigned By    Initials Name Provider Type    JONES Cecilia Hughes PT Physical Therapist        Mobility     Row Name 03/05/21 1131          Bed Mobility    Comment (Bed Mobility)  patient is OOB and returns to the chair  -     Row Name 03/05/21 1131          Transfers    Comment (Transfers)  patient again gets weight going backward on initially standing  -     Row Name 03/05/21 1131          Bed-Chair Transfer    Bed-Chair [place] (Transfers)  minimum assist (75% patient effort);2 person assist  -     Row Name 03/05/21 1131          Sit-Stand Transfer    Sit-Stand [place] (Transfers)  minimum assist  (75% patient effort);2 person assist  -JONES     Row Name 03/05/21 1131          Gait/Stairs (Locomotion)    [place] Level (Gait)  minimum assist (75% patient effort);2 person assist  -JONES     Distance in Feet (Gait)  200  -JONES     Deviations/Abnormal Patterns (Gait)  stride length decreased  -JONES     Left Sided Gait Deviations  forward flexed posture  -JONES     Comment (Gait/Stairs)  patient needs cues for increased step length but more fluid gait pattern compared to yesterday. patient still with external pacemaker in place.  -JONES       User Key  (r) = Recorded By, (t) = Taken By, (c) = Cosigned By    Initials Name Provider Type    Cecilia Denise PT Physical Therapist        Obj/Interventions     Row Name 03/05/21 1133          Hip (Therapeutic Exercise)    Hip AROM (Therapeutic Exercise)  bilateral;flexion;extension;aBduction;aDduction;sitting;10 repetitions  -     Row Name 03/05/21 1133          Knee (Therapeutic Exercise)    Knee AROM (Therapeutic Exercise)  bilateral;flexion;extension;sitting;10 repetitions  -     Row Name 03/05/21 1133          Ankle (Therapeutic Exercise)    Ankle AROM (Therapeutic Exercise)  bilateral;dorsiflexion;plantarflexion;10 repetitions;sitting  -     Row Name 03/05/21 1133          Balance    Balance Assessment  sitting static balance;sitting dynamic balance;standing static balance;standing dynamic balance  -JONES     Static Sitting Balance  WFL  -JONES     Dynamic Sitting Balance  WFL  -JONES     Static Standing Balance  mild impairment  -JONES     Dynamic Standing Balance  mild impairment  -JONES     Balance Interventions  standing;dynamic;weight shifting activity;combined head and eye movements  -       User Key  (r) = Recorded By, (t) = Taken By, (c) = Cosigned By    Initials Name Provider Type    Cecilia Denise PT Physical Therapist        Goals/Plan    No documentation.       Clinical Impression     Row Name 03/05/21 1134          Pain Scale: Numbers Pre/Post-Treatment     Pretreatment Pain Rating  3/10  -JONES     Posttreatment Pain Rating  5/10  -JONES     Pain Location - Orientation  incisional  -JONES     Pain Location  chest  -JONES     Pain Intervention(s)  Repositioned;Ambulation/increased activity;Splinting  -JONES     Row Name 03/05/21 1134          Plan of Care Review    Plan of Care Reviewed With  patient  -JONES     Progress  improving  -JONES     Outcome Summary  patient was able to increase ambulation to 200 ft with min assist for transfers and gait. patient still has external pacemaker connected but patient maintains NSR with HR 60-70. patient is progressing well toward mobility goals  -JONES     Row Name 03/05/21 1134          Therapy Assessment/Plan (PT)    Patient/Family Therapy Goals Statement (PT)  return to PLOF go home  -JONES     Rehab Potential (PT)  good, to achieve stated therapy goals  -JONES     Criteria for Skilled Interventions Met (PT)  yes;skilled treatment is necessary  -JONES     Row Name 03/05/21 1134          Vital Signs    Pre Systolic BP Rehab  128  -JONES     Pre Treatment Diastolic BP  72  -JONES     Post Systolic BP Rehab  139  -JONES     Post Treatment Diastolic BP  80  -JONES     Pretreatment Heart Rate (beats/min)  66  -JONES     Posttreatment Heart Rate (beats/min)  77  -JONES     Pre SpO2 (%)  95  -JONES     O2 Delivery Pre Treatment  nasal cannula  -JONES     Post SpO2 (%)  98  -JONES     O2 Delivery Post Treatment  nasal cannula  -JONES     Pre Patient Position  Sitting  -JONES     Intra Patient Position  Standing  -JONES     Post Patient Position  Sitting  -JONES     Row Name 03/05/21 1134          Positioning and Restraints    Pre-Treatment Position  sitting in chair/recliner  -JONES     Post Treatment Position  chair  -JONES     In Chair  notified nsg;reclined;sitting;call light within reach;encouraged to call for assist  -JONES       User Key  (r) = Recorded By, (t) = Taken By, (c) = Cosigned By    Initials Name Provider Type    Cecilia Denise, PT Physical Therapist        Outcome Measures     Row Name  03/05/21 1138          How much help from another person do you currently need...    Turning from your back to your side while in flat bed without using bedrails?  3  -JONES     Moving from lying on back to sitting on the side of a flat bed without bedrails?  3  -JONES     Moving to and from a bed to a chair (including a wheelchair)?  3  -JONES     Standing up from a chair using your arms (e.g., wheelchair, bedside chair)?  3  -JONES     Climbing 3-5 steps with a railing?  2  -JONES     To walk in hospital room?  3  -JONES     AM-PAC 6 Clicks Score (PT)  17  -JONES       User Key  (r) = Recorded By, (t) = Taken By, (c) = Cosigned By    Initials Name Provider Type    Cecilia Denise PT Physical Therapist        Physical Therapy Education                 Title: PT OT SLP Therapies (In Progress)     Topic: Physical Therapy (In Progress)     Point: Mobility training (In Progress)     Learning Progress Summary           Patient Acceptance, E, NR by JONES at 3/5/2021 1010    Acceptance, E, NR by JONES at 3/4/2021 1030                   Point: Home exercise program (In Progress)     Learning Progress Summary           Patient Acceptance, E, NR by JONES at 3/5/2021 1010    Acceptance, E, NR by JONES at 3/4/2021 1030                   Point: Body mechanics (In Progress)     Learning Progress Summary           Patient Acceptance, E, NR by JONES at 3/5/2021 1010    Acceptance, E, NR by JONES at 3/4/2021 1030                   Point: Precautions (In Progress)     Learning Progress Summary           Patient Acceptance, E, NR by JONES at 3/5/2021 1010    Acceptance, E, NR by JONES at 3/4/2021 1030                               User Key     Initials Effective Dates Name Provider Type Discipline    JONES 06/19/15 -  Cecilia Hughes PT Physical Therapist PT              PT Recommendation and Plan  Planned Therapy Interventions (PT): balance training, gait training, bed mobility training, home exercise program, strengthening, transfer training  Plan of Care Reviewed  With: patient  Progress: improving  Outcome Summary: patient was able to increase ambulation to 200 ft with min assist for transfers and gait. patient still has external pacemaker connected but patient maintains NSR with HR 60-70. patient is progressing well toward mobility goals     Time Calculation:   PT Charges     Row Name 03/05/21 1139             Time Calculation    Start Time  1010  -JONES      PT Received On  03/05/21  -JONES      PT Goal Re-Cert Due Date  03/14/21  -JONES         Time Calculation- PT    Total Timed Code Minutes- PT  24 minute(s)  -JONES         Timed Charges    41965 - PT Therapeutic Activity Minutes  24  -JONES        User Key  (r) = Recorded By, (t) = Taken By, (c) = Cosigned By    Initials Name Provider Type    Cecilia Denise, PT Physical Therapist        Therapy Charges for Today     Code Description Service Date Service Provider Modifiers Qty    19169211592 HC PT EVAL MOD COMPLEXITY 4 3/4/2021 Cecilia Hughes, PT GP 1    20588223451 HC PT THERAPEUTIC ACT EA 15 MIN 3/5/2021 Cecilia Hughes, PT GP 2          PT G-Codes  Outcome Measure Options: AM-PAC 6 Clicks Basic Mobility (PT)  AM-PAC 6 Clicks Score (PT): 17    Cecilia Hughes PT  3/5/2021

## 2021-03-05 NOTE — PLAN OF CARE
Goal Outcome Evaluation:  Plan of Care Reviewed With: patient  Progress: improving  Outcome Summary: At start of shift patient was 100% V-paced at 80bpm. Lowered to 45bpm back up rate at 2230, intrinsic rhythm normal sinus with first degree AV block, HR 60-70, with PVCs and PACs intermittently. Reqired  brief pacing x3 last night for HR dropping less than 45bpm however rate rebounded immediately following first pacer spike with each occurrence. This AM had 2 brief (<6 seconds) bursts of PSVT that resolved spontaneously, epinephrine gtt weaned to .01 this AM per Dr. Guerrier, to be weaned off if able. Ambulated 110ft. in drake last evening with standby assistance. Decreased from 4 to 2LNC, SpO2 >92%. SBP to be kept less than 120mmHg per Dr. Guerrier. 180mL serosanguineous output from chest tubes, occ. intermittent air leak in both atriums. Pain well managed with PRN medication overnight. Still c/o right hand pain/numbness and limited mobility despite equal  bilaterally, patient reports some improvement this morning. Per patient request will remain on clear liquid diet. No other issues.

## 2021-03-05 NOTE — PROGRESS NOTES
Nutrition Services    Patient Name:  Yeison Bryant  YOB: 1955  MRN: [id number]  Admit Date:  3/3/2021                      Clinical Nutrition     Multidisciplinary Rounds      Patient Name: Yeison Bryant  Date of Encounter: 03/04/21 19:43 EST  MRN: [id number]  Admission date: 3/3/2021      Reason for visit: MDR. RD to continue to follow per protocol for intake progression     Additional information obtained during MDR:pt Covid + Thorasic aortic aneurysm bicuspid aortic valve s/p replacement/repair MAZE w ablation bilat pulm veins 3/3 hx CHF cardiomypathy, hyperglycemia - extubated    Current diet: Diet Clear Liquid; Consistent Carbohydrate, Cardiac    Intake: insuffic data    EMR reviewed     Intervention:  Follow treatment plan  Care plan reviewed    Follow up:   Per protocol      Abbie Figueroa RD  19:43 EST  Time: 15min        Electronically signed by:  Abbie Figueroa RD  03/04/21 19:43 EST

## 2021-03-05 NOTE — PROGRESS NOTES
CTS Progress Note       LOS: 2 days   Patient Care Team:  Kreis, Samuel Duane, MD as PCP - General (Family Medicine)  Mabel Flores MD as Consulting Physician (Cardiology)    Chief Complaint: Thoracic aortic aneurysm without rupture (CMS/HCC)    Vital Signs:  Temp:  [97.4 °F (36.3 °C)-98.8 °F (37.1 °C)] 98.7 °F (37.1 °C)  Heart Rate:  [60-94] 69  Resp:  [16-24] 18  BP: ()/(57-83) 114/79  Arterial Line BP: (108-112)/(62-65) 112/65    Physical Exam: Up in chair breathing unlabored       Results:   Results from last 7 days   Lab Units 03/05/21  0408   WBC 10*3/mm3 16.68*   HEMOGLOBIN g/dL 10.4*   HEMATOCRIT % 32.6*   PLATELETS 10*3/mm3 151     Results from last 7 days   Lab Units 03/05/21  0408   SODIUM mmol/L 132*   POTASSIUM mmol/L 5.1   CHLORIDE mmol/L 96*   CO2 mmol/L 28.0   BUN mg/dL 22   CREATININE mg/dL 1.10   GLUCOSE mg/dL 176*   CALCIUM mg/dL 9.0           Imaging Results (Last 24 Hours)     Procedure Component Value Units Date/Time    XR Chest 1 View [[id number]] Resulted: 03/05/21 0453     Updated: 03/05/21 0454    XR Chest 1 View [[id number]] Collected: 03/04/21 0852     Updated: 03/04/21 0856    Narrative:      EXAMINATION: XR CHEST 1 VW-      INDICATION: Post-Op Heart Surgery; I71.2-Thoracic aortic aneurysm,  without rupture; Q23.1-Congenital insufficiency of aortic valve      COMPARISON: NONE     FINDINGS: Interval extubation and removal of nasogastric tube. Remaining  support hardware projects unchanged. No significant pleural effusion.  There is trace left apical pneumothorax. No new focal lobar  consolidation, with scattered subsegmental atelectasis similar to prior.       Impression:      Interval extubation and removal of nasogastric tube.     Trace left apical pneumothorax. Remainder of exam unchanged.     This report was finalized on 3/4/2021 8:53 AM by Bruno Milian.             Assessment      Acending thoracic aortic aneurysm (Repair/replace w/ woven Dacron graft size 30 mm 3/3/21)     New onset atrial fibrillation (S/p Maze w/ Pulmonary Vein Ablation 3/3/21))    Former smoker    COVID-19 virus (12/2020)    Bicuspid aortic valve (S/P 27 trifecta bioprosthetic tissue valve 3/3/21)    Cardiomyopathy (EF <25%)    Patient was ventricularly paced yesterday early.  In the last 12 hours he has maintained his on rate with the pacemaker set at a backup at 40 bpm.  His temporary pacemaker only kicked in 3 times overnight for very short.'s.  We will leave chest tubes and pacer wires intact for today but I anticipate the probable removal tomorrow.  Repeat echocardiogram on Monday  Hyperkalemia per intensivist    Plan   Lasix 40 mg IV x1.  Anticipate removal pacing wires and chest tubes tomorrow    Please note that portions of this note were completed with a voice recognition program. Efforts were made to edit the dictations, but occasionally words are mistranscribed.    Evgeny Guerrier MD  03/05/21  06:48 EST

## 2021-03-05 NOTE — PLAN OF CARE
Goal Outcome Evaluation:  Plan of Care Reviewed With: patient     Outcome Summary: 1. Neuro-  Pt intact and oriented.  Up with 1 assist.  Ambulated hallway twice (200 ft each).  2. Respiratory-  3 LT NC kept oxygen saturation level > 95%.  Ct drainage minimal.  Atrium suction increased to -40 per [facility] PAC for Pneumo on Xray.  3. Cardiac- Sinus rhythm 70-85 bpm and -120  mmhg.  V wires isolated and taped. Kept intrinsic rhythm throughout shift.  4. GI-  Pt ate 30% of meals.  5. -  40 Lasix given.  I and O cath at 1130= 1025 ml.  Pt did not feel need to urinate rest of shift.  6.  Pain- Toradol 15 mg q6 hr PRN used for pain.  Preferred toradol to narcotics.

## 2021-03-05 NOTE — PROGRESS NOTES
"  [facility]  PROGRESS NOTE    Date of Admission: 3/3/2021  Date of Service: 03/05/21    Primary Care Physician: Kreis, Samuel Duane, MD    Chief Complaint: f/u PAF, HTN, HLD  Problem List:   1. Ascending aortic aneurysm  a. 5.6cm on CT chest   b. Replacement and repair of AAA with 30mm Dacron graft 3/3/2021, Dr. Guerrier  2. Low flow- low gradient Aortic stenosis  a. Right and LHC 1/25/21: severe global hypokinesia and EF 20%, mod-severe PAH with elevated LVEDP; cardiac output low at 4.9L/min, normal coronaries, dilated aortic root   b. DIONISIO 1/26/21: EF 21-25%, bicuspid aortic valve with mild AS, moderate functional MR, severe dilation of ascending aorta measuring 5.7cm, RV is mildly dilated   c. Dobutamine stress echo 1/27/21: Best diagnosis is low flow, low gradient aortic stenosis.  d. AV Replacement 27 trifecta bioprosthetic tissue valve, 3/3/2021 Dr. Guerrier  3. Atrial fibrillation   a. Diagnosed 1/18/21 at time of hospitalization in [place]  b. CHADsVASC=2  c. Echo 1/19/21: EF 56-60%, LA is mildly increased, RVSP 42mmHg, moderate dilation of the aortic root   d. MAZE procedure with ablation of left/right superior and inferior pulmonary veins.  Unable to clip CHARLOTTE as DIONISIO demonstrated a clot within the appendage.  4. Covid 19 infection 12/27/20  5. History of tobacco abuse, inactive 2008  6. Probable ALESSANDRO   7. Obesity    Subjective      He is feeling better, no specific complaints. Ambulated in drake. Right hand is better, but still not at baseline.     Objective   Vitals: /72   Pulse 73   Temp 98.9 °F (37.2 °C) (Oral)   Resp 18   Ht 188 cm (74\")   Wt 126 kg (277 lb 12.8 oz)   SpO2 93%   BMI 35.67 kg/m²     Physical Exam:  GENERAL: Alert, cooperative, in no acute distress.   HEENT: Normocephalic, no jugular venous distention  HEART: Regular rhythm, normal rate, and no murmurs, gallops, or rubs.   LUNGS:  No wheezing, rales or rhonchi.  ABDOMEN: Soft, bowel sounds " present, non-tender   NEUROLOGIC: No focal abnormalities involving strength or sensation are noted.   EXTREMITIES: No clubbing, cyanosis, or edema noted. Right hand numbness and difficulty extending 5th digit     Results:  Results from last 7 days   Lab Units 03/05/21  0408 03/04/21  0530 03/03/21 2353   WBC 10*3/mm3 16.68* 13.21* 14.29*   HEMOGLOBIN g/dL 10.4* 10.3* 10.5*   HEMATOCRIT % 32.6* 31.9* 32.2*   PLATELETS 10*3/mm3 151 197 207     Results from last 7 days   Lab Units 03/05/21  0408 03/03/21  2353 03/03/21 2027   SODIUM mmol/L 132* 137 138   POTASSIUM mmol/L 5.1 4.3 4.3   CHLORIDE mmol/L 96* 101 102   CO2 mmol/L 28.0 27.0 26.0   BUN mg/dL 22 25* 24*   CREATININE mg/dL 1.10 1.85* 1.81*   GLUCOSE mg/dL 176* 148* 131*      Lab Results   Component Value Date    CHOL 181 01/19/2021    TRIG 124 01/19/2021    HDL 56 01/19/2021     (H) 01/19/2021    AST 23 03/01/2021    ALT 24 03/01/2021     Results from last 7 days   Lab Units 03/01/21  1608   HEMOGLOBIN A1C % 5.90*       Results from last 7 days   Lab Units 03/04/21  0853 03/03/21  1224 03/01/21  1608   PROTIME Seconds 15.1* 13.0 12.7   INR  1.22* 1.01 0.98   APTT seconds  --  36.1 32.5       Intake/Output Summary (Last 24 hours) at 3/5/2021 1142  Last data filed at 3/5/2021 1000  Gross per 24 hour   Intake 1092.09 ml   Output 1675 ml   Net -582.91 ml       I personally reviewed the patient's EKG/Telemetry data    Radiology Data:   CXR 3/5/21:  IMPRESSION:  1. New approximately 22 mm right apical pneumothorax.  2. Left pneumothorax has nearly resolved.  3. No new chest disease is seen.    Current Medications:  aspirin, 325 mg, Oral, Daily  atorvastatin, 80 mg, Oral, Nightly  insulin lispro, 0-14 Units, Subcutaneous, TID AC  metoprolol tartrate, 12.5 mg, Oral, Q12H  pantoprazole, 40 mg, Oral, Q AM  sennosides-docusate, 2 tablet, Oral, BID      EPINEPHrine, 0.02-0.3 mcg/kg/min, Last Rate: Stopped (03/05/21 0900)  niCARdipine, 5-15 mg/hr  nitroglycerin,  "5-200 mcg/min, Last Rate: Stopped (03/03/21 2030)  norepinephrine, 0.02-0.3 mcg/kg/min, Last Rate: 0.02 mcg/kg/min (03/03/21 1530)        Assessment and Plan:   1. Low Flow, Low Gradient Severe Aortic Stenosis  -  EF by DIONISIO 1/26/21 21-25%.  Bicuspid aortic valve.  Normal coronaries by [facility]  -  POD2 Aortic Valve Replacement using #27 trifecta bioprosthetic tissue valve, with repair of ascending aneurysm using 30mm Dacron graft, Dr. Guerrier.  Per CTS.   - PM \"off\" with only first degree AV block. To floor soon.     2. PAF  - CHADsVASC=2, on Eliquis prior to surgery  - s/o MAZE procedure; could not deliver CHARLOTTE clip as thrombus seen in CHARLOTTE by DIONISIO intra-op   - needs anticoagulation when OK with CTS      3. Leukocytosis   - per intensivists     4. Ulnar neuropathy               - Observation only for today. Improved per patient.      Electronically signed by Eleanor Novak PA-C, 03/05/21, 11:47 AM EST.    "

## 2021-03-05 NOTE — PLAN OF CARE
Problem: Adult Inpatient Plan of Care  Goal: Plan of Care Review  Recent Flowsheet Documentation  Taken 3/5/2021 1134 by Cecilia Hughes, PT  Progress: improving  Plan of Care Reviewed With: patient  Outcome Summary: patient was able to increase ambulation to 200 ft with min assist for transfers and gait. patient still has external pacemaker connected but patient maintains NSR with HR 60-70. patient is progressing well toward mobility goals   Goal Outcome Evaluation:  Plan of Care Reviewed With: patient  Progress: improving  Outcome Summary: patient was able to increase ambulation to 200 ft with min assist for transfers and gait. patient still has external pacemaker connected but patient maintains NSR with HR 60-70. patient is progressing well toward mobility goals

## 2021-03-05 NOTE — PROGRESS NOTES
"Intensive Care Follow-up      LOS: 2 days     Mr. Yeison Bryant, 65 y.o. male is followed for: Glycemic, Electrolyte, Respiratory, and Medical management     Subjective - Interval History     65-year-old white male with a history of tobacco abuse, valvular heart disease, cardiomyopathy with ejection fraction less than 25%, and atrial fibrillation underwent aortic valve replacement with a #27 trifecta bioprosthetic tissue valve, replacement/repair of ascending aortic aneurysm with woven dacryon graft size 30 mm, Maze procedure with ablation of bilateral pulmonary veins on 3/3/2021.     He has been moved to the intensive care unit postoperatively.  Currently mechanically ventilated and sedated on propofol. On nitroglycerin, epinephrine, and norepinephrine drips.     Patient has a history of tobacco abuse, resolved in 2008     Interval history    No problems overnight  Remains on epinephrine drip  Complaining of \"indigestion\"  No air leak from MTs    The patient's relevant past medical, surgical and social history were reviewed and updated in Epic as appropriate.     Objective     Infusions:  EPINEPHrine, 0.02-0.3 mcg/kg/min, Last Rate: 0.01 mcg/kg/min (03/05/21 0645)  niCARdipine, 5-15 mg/hr  nitroglycerin, 5-200 mcg/min, Last Rate: Stopped (03/03/21 2030)  norepinephrine, 0.02-0.3 mcg/kg/min, Last Rate: 0.02 mcg/kg/min (03/03/21 1530)      Medications:  aspirin, 325 mg, Oral, Daily  atorvastatin, 80 mg, Oral, Nightly  insulin lispro, 0-14 Units, Subcutaneous, TID AC  metoprolol tartrate, 12.5 mg, Oral, Q12H  pantoprazole, 40 mg, Oral, Q AM  sennosides-docusate, 2 tablet, Oral, BID      Intake/Output       03/04/21 0700 - 03/05/21 0659    Intake (ml) 965    Output (ml) 2025    Net (ml) -1060        Vital Sign Min/Max for last 24 hours  Temp  Min: 97.4 °F (36.3 °C)  Max: 98.8 °F (37.1 °C)   BP  Min: 92/80  Max: 134/75   Pulse  Min: 60  Max: 94   Resp  Min: 16  Max: 24   SpO2  Min: 92 %  Max: 99 %   No data recorded "        Physical Exam:   GENERAL: Awake, no distress   HEENT: Right IJ introducer site okay, no adenopathy   LUNGS: No wheezes or rhonchi   HEART: Regular rate and rhythm.  Sternal incision without drainage or dehiscence.  MTs without air leak   GI: Soft, nontender   EXTREMITIES: No clubbing, cyanosis, or edema   NEURO/PSYCH: Awake and alert.  Follows commands.  No deficits    Results from last 7 days   Lab Units 03/05/21  0408 03/04/21  0530 03/03/21  2353   WBC 10*3/mm3 16.68* 13.21* 14.29*   HEMOGLOBIN g/dL 10.4* 10.3* 10.5*   PLATELETS 10*3/mm3 151 197 207     Results from last 7 days   Lab Units 03/05/21  0408 03/03/21  2353 03/03/21 2027 03/03/21  1546   SODIUM mmol/L 132* 137 138 138   POTASSIUM mmol/L 5.1 4.3 4.3 4.2   CO2 mmol/L 28.0 27.0 26.0 24.0   BUN mg/dL 22 25* 24* 22   CREATININE mg/dL 1.10 1.85* 1.81* 1.61*   MAGNESIUM mg/dL 2.2 1.7 1.8 1.7   PHOSPHORUS mg/dL  --  4.0 3.6 2.9   GLUCOSE mg/dL 176* 148* 131* 177*     Estimated Creatinine Clearance: 95.7 mL/min (by C-G formula based on SCr of 1.1 mg/dL).    Results from last 7 days   Lab Units 03/01/21  1608   HEMOGLOBIN A1C % 5.90*       Results from last 7 days   Lab Units 03/03/21  1716   PH, ARTERIAL pH units 7.367   PCO2, ARTERIAL mm Hg 45.6*   PO2 ART mm Hg 82.5*     No results found for: LACTATE       Images: Chest x-ray reveals postoperative changes and a small right apical pneumothorax    I reviewed the patient's results and images.     Impression      Active Hospital Problems    Diagnosis   • **Acending thoracic aortic aneurysm (Repair/replace w/ woven Dacron graft size 30 mm 3/3/21)   • Bicuspid aortic valve (S/P 27 trifecta bioprosthetic tissue valve 3/3/21)   • New onset atrial fibrillation (S/p Maze w/ Pulmonary Vein Ablation 3/3/21))   • Cardiomyopathy (EF <25%)   • COVID-19 virus (12/2020)   • Former smoker         Plan        Serial chest x-ray and follow-up right apical pneumothorax  PPI  Bowel regimen  Few doses of Toradol and watch  renal function  Wean epinephrine  Wean FiO2 as able  Monitor blood sugars and electrolytes and address as necessary     Plan of care and goals reviewed with Multidisciplinary Team and Antibiotic Stewardship rounds   I discussed the patient's findings and my recommendations with patient and nursing staff      High level of risk due to:  illness with threat to life or bodily function, parenteral controlled substances and drugs requiring intensive monitoring for toxicity.     JAX Christiansen MD  Pulmonary and Critical Care Medicine

## 2021-03-05 NOTE — PROGRESS NOTES
Continued Stay Note  [facility]     Patient Name: Yeison Bryant  MRN: [id number]  Today's Date: 3/5/2021    Admit Date: 3/3/2021    Discharge Plan     Row Name 03/05/21 1046       Plan    Plan  Home    Patient/Family in Agreement with Plan  yes    Plan Comments  Met with patient in the room.  On O2 at 3L/NC.  V paced at intervals.  Patient with small pneumothorax.  New onset A fib.  Plans for echo Monday.  On clear liquids.  Plan is home.  Following for discharge needs.    Final Discharge Disposition Code  01 - home or self-care        Discharge Codes    No documentation.             Lois Potts RN

## 2021-03-06 ENCOUNTER — APPOINTMENT (OUTPATIENT)
Dept: GENERAL RADIOLOGY | Facility: HOSPITAL | Age: 66
End: 2021-03-06

## 2021-03-06 LAB
ANION GAP SERPL CALCULATED.3IONS-SCNC: 7 MMOL/L (ref 5–15)
BUN SERPL-MCNC: 34 MG/DL (ref 8–23)
BUN/CREAT SERPL: 27.6 (ref 7–25)
CALCIUM SPEC-SCNC: 9.7 MG/DL (ref 8.6–10.5)
CHLORIDE SERPL-SCNC: 96 MMOL/L (ref 98–107)
CO2 SERPL-SCNC: 29 MMOL/L (ref 22–29)
CREAT SERPL-MCNC: 1.23 MG/DL (ref 0.76–1.27)
DEPRECATED RDW RBC AUTO: 46.3 FL (ref 37–54)
ERYTHROCYTE [DISTWIDTH] IN BLOOD BY AUTOMATED COUNT: 13.4 % (ref 12.3–15.4)
GFR SERPL CREATININE-BSD FRML MDRD: 59 ML/MIN/1.73
GLUCOSE BLDC GLUCOMTR-MCNC: 114 MG/DL (ref 70–130)
GLUCOSE BLDC GLUCOMTR-MCNC: 154 MG/DL (ref 70–130)
GLUCOSE BLDC GLUCOMTR-MCNC: 155 MG/DL (ref 70–130)
GLUCOSE SERPL-MCNC: 141 MG/DL (ref 65–99)
HCT VFR BLD AUTO: 33 % (ref 37.5–51)
HGB BLD-MCNC: 10.8 G/DL (ref 13–17.7)
MCH RBC QN AUTO: 30.6 PG (ref 26.6–33)
MCHC RBC AUTO-ENTMCNC: 32.7 G/DL (ref 31.5–35.7)
MCV RBC AUTO: 93.5 FL (ref 79–97)
PLATELET # BLD AUTO: 135 10*3/MM3 (ref 140–450)
PMV BLD AUTO: 10.4 FL (ref 6–12)
POTASSIUM SERPL-SCNC: 4.6 MMOL/L (ref 3.5–5.2)
RBC # BLD AUTO: 3.53 10*6/MM3 (ref 4.14–5.8)
SODIUM SERPL-SCNC: 132 MMOL/L (ref 136–145)
WBC # BLD AUTO: 14.45 10*3/MM3 (ref 3.4–10.8)

## 2021-03-06 PROCEDURE — 32551 INSERTION OF CHEST TUBE: CPT | Performed by: INTERNAL MEDICINE

## 2021-03-06 PROCEDURE — 99024 POSTOP FOLLOW-UP VISIT: CPT | Performed by: THORACIC SURGERY (CARDIOTHORACIC VASCULAR SURGERY)

## 2021-03-06 PROCEDURE — 25010000002 MORPHINE PER 10 MG: Performed by: THORACIC SURGERY (CARDIOTHORACIC VASCULAR SURGERY)

## 2021-03-06 PROCEDURE — 80048 BASIC METABOLIC PNL TOTAL CA: CPT | Performed by: PHYSICIAN ASSISTANT

## 2021-03-06 PROCEDURE — 85027 COMPLETE CBC AUTOMATED: CPT | Performed by: PHYSICIAN ASSISTANT

## 2021-03-06 PROCEDURE — 93010 ELECTROCARDIOGRAM REPORT: CPT | Performed by: INTERNAL MEDICINE

## 2021-03-06 PROCEDURE — 63710000001 INSULIN LISPRO (HUMAN) PER 5 UNITS: Performed by: INTERNAL MEDICINE

## 2021-03-06 PROCEDURE — 97110 THERAPEUTIC EXERCISES: CPT

## 2021-03-06 PROCEDURE — 25010000002 KETOROLAC TROMETHAMINE PER 15 MG: Performed by: INTERNAL MEDICINE

## 2021-03-06 PROCEDURE — 82962 GLUCOSE BLOOD TEST: CPT

## 2021-03-06 PROCEDURE — 93005 ELECTROCARDIOGRAM TRACING: CPT | Performed by: THORACIC SURGERY (CARDIOTHORACIC VASCULAR SURGERY)

## 2021-03-06 PROCEDURE — 71045 X-RAY EXAM CHEST 1 VIEW: CPT

## 2021-03-06 PROCEDURE — 0W9930Z DRAINAGE OF RIGHT PLEURAL CAVITY WITH DRAINAGE DEVICE, PERCUTANEOUS APPROACH: ICD-10-PCS | Performed by: INTERNAL MEDICINE

## 2021-03-06 PROCEDURE — 99232 SBSQ HOSP IP/OBS MODERATE 35: CPT | Performed by: INTERNAL MEDICINE

## 2021-03-06 PROCEDURE — 97530 THERAPEUTIC ACTIVITIES: CPT

## 2021-03-06 RX ORDER — LIDOCAINE HYDROCHLORIDE 10 MG/ML
INJECTION, SOLUTION EPIDURAL; INFILTRATION; INTRACAUDAL; PERINEURAL
Status: COMPLETED
Start: 2021-03-06 | End: 2021-03-06

## 2021-03-06 RX ORDER — TAMSULOSIN HYDROCHLORIDE 0.4 MG/1
0.4 CAPSULE ORAL DAILY
Status: DISCONTINUED | OUTPATIENT
Start: 2021-03-07 | End: 2021-03-09 | Stop reason: HOSPADM

## 2021-03-06 RX ADMIN — OXYCODONE AND ACETAMINOPHEN 2 TABLET: 5; 325 TABLET ORAL at 03:38

## 2021-03-06 RX ADMIN — METOPROLOL TARTRATE 12.5 MG: 25 TABLET, FILM COATED ORAL at 20:19

## 2021-03-06 RX ADMIN — DOCUSATE SODIUM 50MG AND SENNOSIDES 8.6MG 2 TABLET: 8.6; 5 TABLET, FILM COATED ORAL at 20:24

## 2021-03-06 RX ADMIN — ATORVASTATIN CALCIUM 80 MG: 40 TABLET, FILM COATED ORAL at 20:19

## 2021-03-06 RX ADMIN — HYDROCODONE BITARTRATE AND ACETAMINOPHEN 1 TABLET: 7.5; 325 TABLET ORAL at 20:24

## 2021-03-06 RX ADMIN — KETOROLAC TROMETHAMINE 15 MG: 15 INJECTION, SOLUTION INTRAMUSCULAR; INTRAVENOUS at 00:00

## 2021-03-06 RX ADMIN — OXYCODONE AND ACETAMINOPHEN 2 TABLET: 5; 325 TABLET ORAL at 10:46

## 2021-03-06 RX ADMIN — DOCUSATE SODIUM 50MG AND SENNOSIDES 8.6MG 2 TABLET: 8.6; 5 TABLET, FILM COATED ORAL at 08:01

## 2021-03-06 RX ADMIN — INSULIN LISPRO 3 UNITS: 100 INJECTION, SOLUTION INTRAVENOUS; SUBCUTANEOUS at 16:26

## 2021-03-06 RX ADMIN — LIDOCAINE HYDROCHLORIDE 5 ML: 10 INJECTION, SOLUTION EPIDURAL; INFILTRATION; INTRACAUDAL; PERINEURAL at 10:45

## 2021-03-06 RX ADMIN — INSULIN LISPRO 3 UNITS: 100 INJECTION, SOLUTION INTRAVENOUS; SUBCUTANEOUS at 12:41

## 2021-03-06 RX ADMIN — ASPIRIN 325 MG: 325 TABLET, COATED ORAL at 08:01

## 2021-03-06 RX ADMIN — METOPROLOL TARTRATE 12.5 MG: 25 TABLET, FILM COATED ORAL at 08:01

## 2021-03-06 RX ADMIN — OXYCODONE AND ACETAMINOPHEN 2 TABLET: 5; 325 TABLET ORAL at 17:00

## 2021-03-06 RX ADMIN — MORPHINE SULFATE 2 MG: 2 INJECTION, SOLUTION INTRAMUSCULAR; INTRAVENOUS at 10:46

## 2021-03-06 RX ADMIN — PANTOPRAZOLE SODIUM 40 MG: 40 TABLET, DELAYED RELEASE ORAL at 05:43

## 2021-03-06 NOTE — PLAN OF CARE
Problem: Adult Inpatient Plan of Care  Goal: Plan of Care Review  Recent Flowsheet Documentation  Taken 3/6/2021 1318 by Shelby Rojas, PT  Progress: improving  Plan of Care Reviewed With: patient  Outcome Summary: Pt showing improved functional endurance as he increased ambulation distance to 220ft with CGA, with distance limited by A-fib. Pt was asymptomatic, but only returned to sinus rhythm with rest. Pt limited by decreased functional endurance, generalized weakness, and A-fib at this time. Continue to progress as able. D/c rec for HWA remains appopriate at this time.

## 2021-03-06 NOTE — PLAN OF CARE
Goal Outcome Evaluation:  Plan of Care Reviewed With: patient, spouse  Progress: improving  Outcome Summary: Small right pneumo remains on cxr, right anteror chest pigtail placed per Dr El. Tolerated well. MT's to remain until tomorow. VSS, no pacing wires needed for almost two shifts. Ambulated twice in hallway, 220 feet. Still unable to void, two attempts unsuccessful. Bladder scan shows 273 ml. WIll I/O cath. Anticipate MT's and wires dc tomorrow and orders to tele.

## 2021-03-06 NOTE — PROGRESS NOTES
"[facility]  PROGRESS NOTE      Date of Admission: 3/3/2021  Length of Stay: 3  Primary Care Physician: Kreis, Samuel Duane, MD    Chief Complaint:  Follow-up aortic stenosis,     Problem List:   1. Ascending aortic aneurysm  a. 5.6cm on CT chest   b. Replacement and repair of AAA with 30mm Dacron graft 3/3/2021, Dr. Guerrier  2. Low flow- low gradient Aortic stenosis  a. Right and LHC 1/25/21: severe global hypokinesia and EF 20%, mod-severe PAH with elevated LVEDP; cardiac output low at 4.9L/min, normal coronaries, dilated aortic root   b. DIONISIO 1/26/21: EF 21-25%, bicuspid aortic valve with mild AS, moderate functional MR, severe dilation of ascending aorta measuring 5.7cm, RV is mildly dilated   c. Dobutamine stress echo 1/27/21: Best diagnosis is low flow, low gradient aortic stenosis.  d. AV Replacement 27 trifecta bioprosthetic tissue valve, 3/3/2021 Dr. Guerrier  3. Atrial fibrillation   a. Diagnosed 1/18/21 at time of hospitalization in [place]  b. CHADsVASC=2  c. Echo 1/19/21: EF 56-60%, LA is mildly increased, RVSP 42mmHg, moderate dilation of the aortic root   d. MAZE procedure with ablation of left/right superior and inferior pulmonary veins.  Unable to clip CHARLOTTE as DIONISIO demonstrated a clot within the appendage.  4. Covid 19 infection 12/27/20  5. History of tobacco abuse, inactive 2008  6. Probable ALESSANDRO   7. Obesity    Subjective      HPI: He has no specific complaints this morning.  Mediastinal tubes are still in place.  He had no episodes of AV block through the night.      Objective   Vital Signs:  Temp:  [98.2 °F (36.8 °C)-98.8 °F (37.1 °C)] 98.7 °F (37.1 °C)  Heart Rate:  [66-90] 90  Resp:  [16-20] 18  BP: ()/(60-84) 124/79    Intake/Output Summary (Last 24 hours) at 3/6/2021 0911  Last data filed at 3/6/2021 0600  Gross per 24 hour   Intake 327.09 ml   Output 1915 ml   Net -1587.91 ml     Flowsheet Rows      First Filed Value   Admission Height  188 cm (74\") " Documented at 03/03/2021 0523   Admission Weight  126 kg (277 lb 12.8 oz) Documented at 03/03/2021 0523          Physical Exam:   HEENT: Fundoscopic deferred, otherwise unremarkable.  NECK: No Jugular venous distention, adenopathy, or thyromegaly noted.   HEART: No discrete PMI is noted. The 1st and 2nd heart sounds are normal, and no murmurs, gallops, rubs, clicks, or other sounds are noted.  Stable median sternotomy.  LUNGS: Clear to auscultation.  Diminished breath sounds on right.  ABDOMEN: Flat without evidence of organomegaly, masses, or tenderness.  NEUROLOGIC: No focal abnormalities involving strength or sensation are noted.   EXTREMITIES: No clubbing, cyanosis, or edema noted.         Results Review:   Results from last 7 days   Lab Units 03/06/21  0355 03/05/21  0408 03/03/21 2353 03/03/21 2027 03/03/21  1546 03/03/21  1224 03/01/21  1608   SODIUM mmol/L 132* 132* 137 138 138 141 144   POTASSIUM mmol/L 4.6 5.1 4.3 4.3 4.2 5.5* 4.6   CHLORIDE mmol/L 96* 96* 101 102 104 107 105   CO2 mmol/L 29.0 28.0 27.0 26.0 24.0 24.0 29.0   BUN mg/dL 34* 22 25* 24* 22 21 14   CREATININE mg/dL 1.23 1.10 1.85* 1.81* 1.61* 1.20 1.06   GLUCOSE mg/dL 141* 176* 148* 131* 177* 189* 130*   CALCIUM mg/dL 9.7 9.0 9.7 9.5 9.2 9.4 9.5         Results from last 7 days   Lab Units 03/06/21  0355 03/05/21  0408 03/04/21  0530 03/03/21 2353 03/03/21 2027 03/03/21  1546 03/03/21  1224   WBC 10*3/mm3 14.45* 16.68* 13.21* 14.29* 13.91* 10.94* 14.47*   HEMOGLOBIN g/dL 10.8* 10.4* 10.3* 10.5* 10.6* 10.2* 11.5*   HEMATOCRIT % 33.0* 32.6* 31.9* 32.2* 33.3* 32.0* 35.3*   PLATELETS 10*3/mm3 135* 151 197 207 195 174 220     Results from last 7 days   Lab Units 03/04/21  0853 03/03/21  1224 03/01/21  1608   INR  1.22* 1.01 0.98   APTT seconds  --  36.1 32.5     Results from last 7 days   Lab Units 03/05/21  0408   MAGNESIUM mg/dL 2.2             Results from last 7 days   Lab Units 03/01/21  1608   HEMOGLOBIN A1C % 5.90*           Current  "Medications:  aspirin, 325 mg, Oral, Daily  atorvastatin, 80 mg, Oral, Nightly  insulin lispro, 0-14 Units, Subcutaneous, TID AC  metoprolol tartrate, 12.5 mg, Oral, Q12H  pantoprazole, 40 mg, Oral, Q AM  sennosides-docusate, 2 tablet, Oral, BID      EPINEPHrine, 0.02-0.3 mcg/kg/min, Last Rate: Stopped (03/05/21 0900)  niCARdipine, 5-15 mg/hr  nitroglycerin, 5-200 mcg/min, Last Rate: Stopped (03/03/21 2030)  norepinephrine, 0.02-0.3 mcg/kg/min, Last Rate: 0.02 mcg/kg/min (03/03/21 1530)          I reviewed the patient's new clinical results.  I personally viewed and interpreted the patient's EKG/Telemetry data    Assessment and Plan:     1. Low Flow, Low Gradient Severe Aortic Stenosis  -  EF by DIONISIO 1/26/21 21-25%.  Bicuspid aortic valve.  Normal coronaries by [facility]  -  POD2 Aortic Valve Replacement using #27 trifecta bioprosthetic tissue valve, with repair of ascending aneurysm using 30mm Dacron graft, Dr. Guerrier.  Per CTS.   - PM \"off\" with only first degree AV block. To floor when tubes out.     2. PAF  - CHADsVASC=2, on Eliquis prior to surgery  - s/o MAZE procedure; could not deliver CHARLOTTE clip as thrombus seen in CHARLOTTE by DIONISIO intra-op   - needs anticoagulation when OK with CTS      3. Leukocytosis   - per intensivists     4. Ulnar neuropathy               - Observation only for today. Improved per patient.    5. Worsening right pneumothorax: Per CTS and intensivists.      Mahendra Waldron MD  03/06/21  09:11 EST  "

## 2021-03-06 NOTE — PROGRESS NOTES
CTS Progress Note       LOS: 3 days   Patient Care Team:  Kreis, Samuel Duane, MD as PCP - General (Family Medicine)  Mabel Flores MD as Consulting Physician (Cardiology)    Chief Complaint: Thoracic aortic aneurysm without rupture (CMS/HCC)    Vital Signs:  Temp:  [98.2 °F (36.8 °C)-98.9 °F (37.2 °C)] 98.7 °F (37.1 °C)  Heart Rate:  [62-85] 68  Resp:  [16-20] 18  BP: ()/(60-84) 98/60    Physical Exam:       Results:   Results from last 7 days   Lab Units 03/06/21  0355   WBC 10*3/mm3 14.45*   HEMOGLOBIN g/dL 10.8*   HEMATOCRIT % 33.0*   PLATELETS 10*3/mm3 135*     Results from last 7 days   Lab Units 03/06/21  0355   SODIUM mmol/L 132*   POTASSIUM mmol/L 4.6   CHLORIDE mmol/L 96*   CO2 mmol/L 29.0   BUN mg/dL 34*   CREATININE mg/dL 1.23   GLUCOSE mg/dL 141*   CALCIUM mg/dL 9.7           Imaging Results (Last 24 Hours)     Procedure Component Value Units Date/Time    XR Chest 1 View [[id number]] Resulted: 03/06/21 0506     Updated: 03/06/21 0544    XR Chest 1 View [[id number]] Collected: 03/05/21 0854     Updated: 03/06/21 0000    Narrative:      EXAMINATION: XR CHEST 1 VW- 03/05/2021     INDICATION: Post-Op Heart Surgery; I71.2-Thoracic aortic aneurysm,  without rupture; Q23.1-Congenital insufficiency of aortic valve     COMPARISON: 03/04/2021     FINDINGS: There is a new right apical pneumothorax approximately 22 mm  in width. Left pneumothorax is barely visible only approximately 1 mm.  Heart is enlarged. Vasculature is mildly cephalized but there is no  evidence of pulmonary edema. There may be trace right pleural effusion.       Impression:      1. New approximately 22 mm right apical pneumothorax.  2. Left pneumothorax has nearly resolved.  3. No new chest disease is seen.     NOTE: Preliminary findings of this exam were called to EVA Martin,  at 8:54 AM 03/05/2021.      D:  03/05/2021  E:  03/05/2021     This report was finalized on 3/5/2021 11:57 PM by Dr. Merritt Rodriguez MD.       XR Chest 1 View  [[id number]] Collected: 03/05/21 1235     Updated: 03/05/21 1241    Narrative:      EXAMINATION: XR CHEST 1 VW-     INDICATION: PNEUMOTHORAX     COMPARISON: 4:33 AM exam of same date     FINDINGS: 12:12 PM portable chest radiograph shows increasing right  apical pneumothorax, from approximately 22 mm of apical pleural  separation to approximately 40 mm. No tension component is appreciated.  Chest otherwise appears stable. No new pulmonary parenchymal disease is  seen. Heart is enlarged. Vasculature appears normal.          Impression:      Gradually increasing right apical pneumothorax, from  approximately 22 mm to approximately 40 mm, compared to 4:33 AM exam of  today's date.         This report was finalized on 3/5/2021 12:38 PM by Dr. Merritt Rodriguez MD.             Assessment      Acending thoracic aortic aneurysm (Repair/replace w/ woven Dacron graft size 30 mm 3/3/21)    New onset atrial fibrillation (S/p Maze w/ Pulmonary Vein Ablation 3/3/21))    Former smoker    COVID-19 virus (12/2020)    Bicuspid aortic valve (S/P 27 trifecta bioprosthetic tissue valve 3/3/21)    Cardiomyopathy (EF <25%)    Right-sided pneumothorax is significant.  He will need a pigtail catheter placed by radiology to 20 cm low wall suction.  Following this the other chest tubes and pacer wires could be removed    Plan   Radiology to place right pleural pigtail catheter to 20 cm low wall suction.  Chest x-ray in the a.m.  Anticipate removal of remaining tubes and pacer wires tomorrow    Please note that portions of this note were completed with a voice recognition program. Efforts were made to edit the dictations, but occasionally words are mistranscribed.    Evgeny Guerrier MD  03/06/21  07:38 EST

## 2021-03-06 NOTE — THERAPY TREATMENT NOTE
Patient Name: Yeison Bryant  : 1955    MRN: [id number]                              Today's Date: 3/6/2021       Admit Date: 3/3/2021    Visit Dx:     ICD-10-CM ICD-9-CM   1. Postprocedural pneumothorax  J95.811 512.1   2. Thoracic aortic aneurysm without rupture (CMS/[facility])  I71.2 441.2   3. Bicuspid aortic valve  Q23.1 746.4     Patient Active Problem List   Diagnosis   • CHF (congestive heart failure) (CMS/[facility])   • New onset atrial fibrillation (S/p Maze w/ Pulmonary Vein Ablation 3/3/21))   • Former smoker   • COVID-19 virus (2020)   • Acending thoracic aortic aneurysm (Repair/replace w/ woven Dacron graft size 30 mm 3/3/21)   • Bicuspid aortic valve (S/P 27 trifecta bioprosthetic tissue valve 3/3/21)   • Cardiomyopathy (EF <25%)     Past Medical History:   Diagnosis Date   • Arrhythmia    • Arthritis    • Atrial fibrillation (CMS/[facility])    • Cataract    • CHF (congestive heart failure) (CMS/[facility]) 2021   • COVID-19 2020   • Dyslipidemia    • Former smoker    • History of gout    • History of pneumonia    • Hypertension    • Obesity (BMI 30-39.9)    • SOB (shortness of breath)      Past Surgical History:   Procedure Laterality Date   • AORTIC VALVE REPAIR/REPLACEMENT N/A 3/3/2021    Procedure: MEDIAN STERNOTOMY, MAZE, AORTIC VALVE REPLACEMENT WITH LEFT FEMORAL ARTERIAL CANNULATION;  Surgeon: Evgeny Guerrier MD;  Location: [facility] OR;  Service: Cardiothoracic;  Laterality: N/A;   • CARDIAC CATHETERIZATION Left 2021    Procedure: Left Heart Cath - COVID+ ;  Surgeon: Mahendra Waldron MD;  Location:  [facility] CATH INVASIVE LOCATION;  Service: Cardiology;  Laterality: Left;   • CARDIAC CATHETERIZATION N/A 2021    Procedure: Aortic root aortogram;  Surgeon: Mahendra Waldron MD;  Location:  [facility] CATH INVASIVE LOCATION;  Service: Cardiology;  Laterality: N/A;   • CATARACT EXTRACTION     • COLONOSCOPY     • EYE SURGERY      lasic and cataract   • KNEE SURGERY Bilateral    •  THORACIC AORTIC ANEURYSM REPAIR N/A 3/3/2021    Procedure: ASCENDING THORACIC AORTIC ANEURYSM REPAIR, DIONISIO PER ANESTHESIA;  Surgeon: Evgeny Guerrier MD;  Location: [facility];  Service: Cardiothoracic;  Laterality: N/A;   • TONSILLECTOMY       General Information     Row Name 03/06/21 1314          Physical Therapy Time and Intention    Document Type  therapy note (daily note)  -AY     Mode of Treatment  physical therapy;individual therapy  -AY     Row Name 03/06/21 1314          General Information    Patient Profile Reviewed  yes  -AY     Existing Precautions/Restrictions  cardiac;fall;oxygen therapy device and L/min;sternal  -AY     Row Name 03/06/21 1314          Cognition    Orientation Status (Cognition)  oriented x 4  -AY     Row Name 03/06/21 1314          Safety Issues, Functional Mobility    Impairments Affecting Function (Mobility)  balance;endurance/activity tolerance;shortness of breath;strength  -AY       User Key  (r) = Recorded By, (t) = Taken By, (c) = Cosigned By    Initials Name Provider Type    AY Shelby Rojas, PT Physical Therapist        Mobility     Row Name 03/06/21 1314          Bed Mobility    Bed Mobility  supine-sit;sit-supine;scooting/bridging  -AY     Scooting/Bridging [place] (Bed Mobility)  moderate assist (50% patient effort);2 person assist  -AY     Supine-Sit [place] (Bed Mobility)  moderate assist (50% patient effort);1 person assist  -AY     Sit-Supine [place] (Bed Mobility)  moderate assist (50% patient effort);1 person assist  -AY     Assistive Device (Bed Mobility)  draw sheet;head of bed elevated  -AY     Comment (Bed Mobility)  Verbal cueing for sequencing, use of log roll technique, and sternal precautions.  -AY     Row Name 03/06/21 1314          Transfers    Comment (Transfers)  Verbal cueing for hand placement and sequencing. Pt with posterior lean present initially upon standing.  -AY     Row Name 03/06/21 1314          Sit-Stand Transfer    Sit-Stand  [place] (Transfers)  contact guard;verbal cues  -AY     Row Name 03/06/21 1314          Gait/Stairs (Locomotion)    [place] Level (Gait)  contact guard;verbal cues  -AY     Distance in Feet (Gait)  220  -AY     Deviations/Abnormal Patterns (Gait)  stride length decreased;bilateral deviations;jj decreased;gait speed decreased  -AY     Bilateral Gait Deviations  forward flexed posture;heel strike decreased  -AY     Comment (Gait/Stairs)  Pt ambulated 220ft with CGA demonstrating step through gait pattern with decreased jj. After walking 100ft pt went into A-fib and remained there for the rest of mobility. Pt reported no symptoms present at any point. Verbal cueing for posture, increased stride length, and B heel strike. Gait distance limited by fatigue.  -AY       User Key  (r) = Recorded By, (t) = Taken By, (c) = Cosigned By    Initials Name Provider Type    Shelby Walton PT Physical Therapist        Obj/Interventions     Row Name 03/06/21 1318          Motor Skills    Therapeutic Exercise  hip;knee;ankle  -AY     Row Name 03/06/21 1318          Hip (Therapeutic Exercise)    Hip (Therapeutic Exercise)  AROM (active range of motion)  -AY     Hip AROM (Therapeutic Exercise)  bilateral;flexion;extension;aBduction;aDduction;supine;10 repetitions  -AY     Row Name 03/06/21 1318          Knee (Therapeutic Exercise)    Knee (Therapeutic Exercise)  AROM (active range of motion)  -AY     Knee AROM (Therapeutic Exercise)  bilateral;LAQ (long arc quad);sitting;10 repetitions;5 repetitions  -AY     Row Name 03/06/21 1318          Ankle (Therapeutic Exercise)    Ankle (Therapeutic Exercise)  AROM (active range of motion)  -AY     Ankle AROM (Therapeutic Exercise)  bilateral;dorsiflexion;plantarflexion;10 repetitions;5 repetitions;supine  -AY     Row Name 03/06/21 1318          Balance    Balance Assessment  sitting static balance;sitting dynamic balance;standing static balance;standing dynamic balance  -AY      Static Sitting Balance  WFL;sitting, edge of bed;supported  -AY     Dynamic Sitting Balance  WFL;sitting, edge of bed;unsupported  -AY     Static Standing Balance  WFL;supported;standing  -AY     Dynamic Standing Balance  mild impairment;standing;supported  -AY     Balance Interventions  sitting;sit to stand;standing;supported;static;weight shifting activity  -AY       User Key  (r) = Recorded By, (t) = Taken By, (c) = Cosigned By    Initials Name Provider Type    AY Shelby Rojas, PT Physical Therapist        Goals/Plan    No documentation.       Clinical Impression     Row Name 03/06/21 1318          Pain    Additional Documentation  Pain Scale: Numbers Pre/Post-Treatment (Group)  -AY     Row Name 03/06/21 8398          Pain Scale: Numbers Pre/Post-Treatment    Pretreatment Pain Rating  0/10 - no pain  -AY     Posttreatment Pain Rating  0/10 - no pain  -AY     Pre/Posttreatment Pain Comment  Pt denied pain. RN reported pt recently recieved pain medication for CT insertion.  -AY     Pain Intervention(s)  Ambulation/increased activity;Repositioned  -AY     Row Name 03/06/21 4367          Plan of Care Review    Plan of Care Reviewed With  patient  -AY     Progress  improving  -AY     Outcome Summary  Pt showing improved functional endurance as he increased ambulation distance to 220ft with CGA, with distance limited by A-fib. Pt was asymptomatic, but only returned to sinus rhythm with rest. Pt limited by decreased functional endurance, generalized weakness, and A-fib at this time. Continue to progress as able. D/c rec for HWA remains appopriate at this time.  -AY     Row Name 03/06/21 1318          Vital Signs    Pre Systolic BP Rehab  119  -AY     Pre Treatment Diastolic BP  97  -AY     Post Systolic BP Rehab  121  -AY     Post Treatment Diastolic BP  82  -AY     Pretreatment Heart Rate (beats/min)  76  -AY     Intratreatment Heart Rate (beats/min)  114  -AY     Posttreatment Heart Rate (beats/min)  98  -AY      Pre SpO2 (%)  95  -AY     O2 Delivery Pre Treatment  nasal cannula  -AY     Intra SpO2 (%)  95  -AY     O2 Delivery Intra Treatment  nasal cannula  -AY     Post SpO2 (%)  96  -AY     O2 Delivery Post Treatment  nasal cannula  -AY     Pre Patient Position  Supine  -AY     Intra Patient Position  Standing  -AY     Post Patient Position  Supine  -AY     Row Name 03/06/21 1318          Positioning and Restraints    Pre-Treatment Position  in bed  -AY     Post Treatment Position  bed  -AY     In Bed  notified nsg;supine;call light within reach;encouraged to call for assist;legs elevated;RUE elevated;LUE elevated;side rails up x2  -AY       User Key  (r) = Recorded By, (t) = Taken By, (c) = Cosigned By    Initials Name Provider Type    Shelby Walton PT Physical Therapist        Outcome Measures     Row Name 03/06/21 1322          How much help from another person do you currently need...    Turning from your back to your side while in flat bed without using bedrails?  2  -AY     Moving from lying on back to sitting on the side of a flat bed without bedrails?  3  -AY     Moving to and from a bed to a chair (including a wheelchair)?  3  -AY     Standing up from a chair using your arms (e.g., wheelchair, bedside chair)?  3  -AY     Climbing 3-5 steps with a railing?  3  -AY     To walk in hospital room?  3  -AY     AM-PAC 6 Clicks Score (PT)  17  -AY     Row Name 03/06/21 1322          Functional Assessment    Outcome Measure Options  AM-PAC 6 Clicks Basic Mobility (PT)  -AY       User Key  (r) = Recorded By, (t) = Taken By, (c) = Cosigned By    Initials Name Provider Type    Shelby Walton, MARTHA Physical Therapist        Physical Therapy Education                 Title: PT OT SLP Therapies (Done)     Topic: Physical Therapy (Done)     Point: Mobility training (Done)     Learning Progress Summary           Patient Acceptance, E,TB, VU,NR by HUY at 3/6/2021 1322    Acceptance, E, VU by JONES at 3/6/2021 0429    Acceptance, E,  NR by JB1 at 3/5/2021 1010    Acceptance, E, NR by JB1 at 3/4/2021 1030   Family Acceptance, E,TB, VU,NR by AY at 3/6/2021 1322                   Point: Home exercise program (Done)     Learning Progress Summary           Patient Acceptance, E,TB, VU,NR by AY at 3/6/2021 1322    Acceptance, E, NR by JB1 at 3/5/2021 1010    Acceptance, E, NR by JB1 at 3/4/2021 1030   Family Acceptance, E,TB, VU,NR by AY at 3/6/2021 1322                   Point: Body mechanics (Done)     Learning Progress Summary           Patient Acceptance, E,TB, VU,NR by AY at 3/6/2021 1322    Acceptance, E, VU by JONES at 3/6/2021 0429    Acceptance, E, NR by JB1 at 3/5/2021 1010    Acceptance, E, NR by JB1 at 3/4/2021 1030   Family Acceptance, E,TB, VU,NR by AY at 3/6/2021 1322                   Point: Precautions (Done)     Learning Progress Summary           Patient Acceptance, E,TB, VU,NR by AY at 3/6/2021 1322    Acceptance, E, NR by JB1 at 3/5/2021 1010    Acceptance, E, NR by JB1 at 3/4/2021 1030   Family Acceptance, E,TB, VU,NR by AY at 3/6/2021 1322                               User Key     Initials Effective Dates Name Provider Type Discipline    [facility] 06/19/15 -  Cecilia Hughes PT Physical Therapist PT     06/16/16 -  Clint Avilez RN Registered Nurse Nurse     11/10/20 -  Shelby Rojas PT Physical Therapist PT              PT Recommendation and Plan     Plan of Care Reviewed With: patient  Progress: improving  Outcome Summary: Pt showing improved functional endurance as he increased ambulation distance to 220ft with CGA, with distance limited by A-fib. Pt was asymptomatic, but only returned to sinus rhythm with rest. Pt limited by decreased functional endurance, generalized weakness, and A-fib at this time. Continue to progress as able. D/c rec for HWA remains appopriate at this time.     Time Calculation:   PT Charges     Row Name 03/06/21 1323             Time Calculation    Start Time  1122  -AY      PT Received On   03/06/21  -AY         Time Calculation- PT    Total Timed Code Minutes- PT  23 minute(s)  -AY         Timed Charges    52784 - PT Therapeutic Exercise Minutes  8  -AY      48117 - PT Therapeutic Activity Minutes  15  -AY        User Key  (r) = Recorded By, (t) = Taken By, (c) = Cosigned By    Initials Name Provider Type    AY Shelby Rojas, PT Physical Therapist        Therapy Charges for Today     Code Description Service Date Service Provider Modifiers Qty    59808738894 HC PT THER PROC EA 15 MIN 3/6/2021 Shelby Rojas, PT GP 1    67718508567 HC PT THERAPEUTIC ACT EA 15 MIN 3/6/2021 Shelby Rojas, PT GP 1          PT G-Codes  Outcome Measure Options: AM-PAC 6 Clicks Basic Mobility (PT)  AM-PAC 6 Clicks Score (PT): 17    Shelby Rojas PT  3/6/2021

## 2021-03-06 NOTE — PROCEDURES
Chest Tube Insertion    Date/Time: 3/6/2021 11:15 AM  Performed by: Conrad El MD  Authorized by: Conrad El MD   Indications: pneumothorax    Sedation:  Patient sedated: no    Anesthesia: local infiltration    Anesthesia:  Local Anesthetic: lidocaine 1% without epinephrine  Anesthetic total: 15 mL  Preparation: skin prepped with ChloraPrep  Placement location: right anterior  Scalpel size: 11  Tube size (Serbian): 14.  Dissection instrument: dilator from Daniele pneumothorax kit.  Ultrasound guidance: yes  Tension pneumothorax heard: no  Tube connected to: suction  Drainage characteristics: none.  Suture material: 2-0 silk  Dressing: chlorhexidine impregnated dressing.  Post-insertion x-ray findings: tube in good position  Patient tolerance: patient tolerated the procedure well with no immediate complications

## 2021-03-06 NOTE — PROGRESS NOTES
"Nutrition Services    Patient Name:  Yeison Bryant  YOB: 1955  MRN: [id number]  Admit Date:  3/3/2021                      Clinical Nutrition     Nutrition Assessment  Reason for Visit:   MDR condition assess      Patient Name: Yeison Bryant  YOB: 1955  MRN: [id number]  Date of Encounter: 03/05/21 20:59 EST  Admission date: 3/3/2021      Comments:  Pt had requested cont clr liq diet w GI distress.  Diet now advanced. Taking some solid food, giving food choices. See to assure intake progression when feasible.        Nutrition Assessment       Hospital Problem List    Acending thoracic aortic aneurysm (Repair/replace w/ woven Dacron graft size 30 mm 3/3/21)    New onset atrial fibrillation (S/p Maze w/ Pulmonary Vein Ablation 3/3/21))    Former smoker    COVID-19 virus (12/2020)    Bicuspid aortic valve (S/P 27 trifecta bioprosthetic tissue valve 3/3/21)    Cardiomyopathy (EF <25%)    Hyperglycemia    Applicable PMH/PSxH:     PMH: He  has a past medical history of Arrhythmia, Arthritis, Atrial fibrillation (CMS/[facility]), Cataract, CHF (congestive heart failure) (CMS/HCC) (01/18/2021), COVID-19 (12/2020), Dyslipidemia, Former smoker, History of gout (2011), History of pneumonia (1974), Hypertension, Obesity (BMI 30-39.9), and SOB (shortness of breath).   PSxH: He  has a past surgical history that includes Colonoscopy; Eye surgery; Cardiac catheterization (Left, 1/25/2021); Cardiac catheterization (N/A, 1/25/2021); Cataract extraction; Knee surgery (Bilateral); Tonsillectomy; Aortic valve replacement (N/A, 3/3/2021); and Thoracic aortic aneurysm repair (N/A, 3/3/2021).         Reported/Observed/Food/Nutrition Related History:     Pt request cont clr liq diet this am - now advanced.      Anthropometrics     Height: 188 cm (74\")  Last filed wt: Weight: 126 kg (277 lb 12.8 oz) (03/03/21 0523)       BMI: BMI (Calculated): 35.7  Obese Class II: 35-39.9kg/m2    Ideal Body Weight (IBW) (kg): " 87.66      Labs reviewed     Results from last 7 days   Lab Units 03/05/21  0408 03/03/21 2353 03/03/21 2027 03/03/21  1546  03/01/21  1608   GLUCOSE mg/dL 176* 148* 131* 177*   < > 130*   BUN mg/dL 22 25* 24* 22   < > 14   CREATININE mg/dL 1.10 1.85* 1.81* 1.61*   < > 1.06   SODIUM mmol/L 132* 137 138 138   < > 144   CHLORIDE mmol/L 96* 101 102 104   < > 105   POTASSIUM mmol/L 5.1 4.3 4.3 4.2   < > 4.6   PHOSPHORUS mg/dL  --  4.0 3.6 2.9   < >  --    MAGNESIUM mg/dL 2.2 1.7 1.8 1.7   < > 1.8   ALT (SGPT) U/L  --   --   --   --   --  24    < > = values in this interval not displayed.     Results from last 7 days   Lab Units 03/03/21 2353 03/03/21 2027 03/03/21  1546 03/03/21  1224   ALBUMIN g/dL 4.80 4.80 4.50 4.10   IONIZED CALCIUM mmol/L  --   --   --  1.31         Results from last 7 days   Lab Units 03/05/21 2004 03/05/21  1630 03/05/21  1123 03/05/21  0729 03/04/21  2136 03/04/21  2040   GLUCOSE mg/dL 260* 126 149* 157* 153* 163*     Lab Results   Lab Value Date/Time    HGBA1C 5.90 (H) 03/01/2021 1608    HGBA1C 6.00 (H) 01/18/2021 1436         Medications reviewed   Pertinent:  Insulin, pericolace protonix weaning epi      Intake/Ouptut 24 hrs (7:00AM - 6:59 AM)     Intake & Output (last day)       03/05 0701 - 03/06 0700    P.O. 320    I.V. (mL/kg) 7.1 (0.1)    IV Piggyback     Total Intake(mL/kg) 327.1 (2.6)    Urine (mL/kg/hr) 1075 (0.6)    Chest Tube 40    Total Output 1115    Net -787.9               Current Nutrition Prescription     PO: Diet Regular; Thin; Consistent Carbohydrate, Cardiac  No active supplement orders      Intake: insuffic data 25% x 1 solid food meal      Nutrition Diagnosis     3/5  Problem Predicted Suboptimal Intake   Etiology Post op nausea   Signs/Symptoms 25% intake of one solid food meal          Nutrition Intervention     Follow treatment progress, Care plan reviewed      Goal:   General: Nutrition support treatment  PO: Establish PO      Monitoring/Evaluation:   Per  protocol, PO intake, Pertinent labs, GI status, Symptoms        Abbie Figueroa RD,   Time Spent: 20 min        Electronically signed by:  Abbie Figueroa RD  03/05/21 20:59 EST

## 2021-03-06 NOTE — PLAN OF CARE
Goal Outcome Evaluation:  Plan of Care Reviewed With: patient  Progress: improving      Pt. Rested well throughout the night. Had some ectopy that resolved without need for pacing. Lasted less than 5 seconds. Has had trouble voiding, has needed I/o cathed.  Otherwise, VSS, no acute changed.      Neuro: A/Ox4. MAEE. Ambulated well this evening.  Resp: lungs Clear, diminished in bases.   Cardiac: SBP-100-120, HR- 60-70, NSR with 1st degree block. Minimal chest tube drainage. ~150 ml total in the last 12 hours.  GI/: no BM at this time and has needed I/o cathed to void. Good UOP.  Integ: incisions CDI  Pain: managed well with Toradol and oral percocet.

## 2021-03-07 ENCOUNTER — APPOINTMENT (OUTPATIENT)
Dept: GENERAL RADIOLOGY | Facility: HOSPITAL | Age: 66
End: 2021-03-07

## 2021-03-07 LAB
ANION GAP SERPL CALCULATED.3IONS-SCNC: 9 MMOL/L (ref 5–15)
BUN SERPL-MCNC: 32 MG/DL (ref 8–23)
BUN/CREAT SERPL: 37.2 (ref 7–25)
CALCIUM SPEC-SCNC: 9.1 MG/DL (ref 8.6–10.5)
CHLORIDE SERPL-SCNC: 97 MMOL/L (ref 98–107)
CO2 SERPL-SCNC: 29 MMOL/L (ref 22–29)
CREAT SERPL-MCNC: 0.86 MG/DL (ref 0.76–1.27)
DEPRECATED RDW RBC AUTO: 45.1 FL (ref 37–54)
ERYTHROCYTE [DISTWIDTH] IN BLOOD BY AUTOMATED COUNT: 13.2 % (ref 12.3–15.4)
GFR SERPL CREATININE-BSD FRML MDRD: 90 ML/MIN/1.73
GLUCOSE BLDC GLUCOMTR-MCNC: 127 MG/DL (ref 70–130)
GLUCOSE BLDC GLUCOMTR-MCNC: 130 MG/DL (ref 70–130)
GLUCOSE SERPL-MCNC: 119 MG/DL (ref 65–99)
HCT VFR BLD AUTO: 32.4 % (ref 37.5–51)
HGB BLD-MCNC: 10.5 G/DL (ref 13–17.7)
MCH RBC QN AUTO: 30.7 PG (ref 26.6–33)
MCHC RBC AUTO-ENTMCNC: 32.4 G/DL (ref 31.5–35.7)
MCV RBC AUTO: 94.7 FL (ref 79–97)
PLATELET # BLD AUTO: 136 10*3/MM3 (ref 140–450)
PMV BLD AUTO: 9.8 FL (ref 6–12)
POTASSIUM SERPL-SCNC: 4.3 MMOL/L (ref 3.5–5.2)
RBC # BLD AUTO: 3.42 10*6/MM3 (ref 4.14–5.8)
SODIUM SERPL-SCNC: 135 MMOL/L (ref 136–145)
WBC # BLD AUTO: 10.96 10*3/MM3 (ref 3.4–10.8)

## 2021-03-07 PROCEDURE — 80048 BASIC METABOLIC PNL TOTAL CA: CPT | Performed by: PHYSICIAN ASSISTANT

## 2021-03-07 PROCEDURE — 71045 X-RAY EXAM CHEST 1 VIEW: CPT

## 2021-03-07 PROCEDURE — 93005 ELECTROCARDIOGRAM TRACING: CPT | Performed by: INTERNAL MEDICINE

## 2021-03-07 PROCEDURE — 93010 ELECTROCARDIOGRAM REPORT: CPT | Performed by: INTERNAL MEDICINE

## 2021-03-07 PROCEDURE — 99232 SBSQ HOSP IP/OBS MODERATE 35: CPT | Performed by: INTERNAL MEDICINE

## 2021-03-07 PROCEDURE — 97530 THERAPEUTIC ACTIVITIES: CPT

## 2021-03-07 PROCEDURE — 85027 COMPLETE CBC AUTOMATED: CPT | Performed by: THORACIC SURGERY (CARDIOTHORACIC VASCULAR SURGERY)

## 2021-03-07 PROCEDURE — 82962 GLUCOSE BLOOD TEST: CPT

## 2021-03-07 RX ADMIN — DOCUSATE SODIUM 50MG AND SENNOSIDES 8.6MG 2 TABLET: 8.6; 5 TABLET, FILM COATED ORAL at 08:01

## 2021-03-07 RX ADMIN — ASPIRIN 325 MG: 325 TABLET, COATED ORAL at 08:01

## 2021-03-07 RX ADMIN — METOPROLOL TARTRATE 12.5 MG: 25 TABLET, FILM COATED ORAL at 08:01

## 2021-03-07 RX ADMIN — PANTOPRAZOLE SODIUM 40 MG: 40 TABLET, DELAYED RELEASE ORAL at 06:17

## 2021-03-07 RX ADMIN — ATORVASTATIN CALCIUM 80 MG: 40 TABLET, FILM COATED ORAL at 20:16

## 2021-03-07 RX ADMIN — TAMSULOSIN HYDROCHLORIDE 0.4 MG: 0.4 CAPSULE ORAL at 08:01

## 2021-03-07 RX ADMIN — METOPROLOL TARTRATE 12.5 MG: 25 TABLET, FILM COATED ORAL at 20:16

## 2021-03-07 NOTE — PLAN OF CARE
Goal Outcome Evaluation:  Patient AOX4/4, VSS on room air and no drips. Patient walked 200ft with x1 assistance. UO 300mL; no need for I/O urine cath. Anterior pleural tube had out approx 10mL over night. And MT approx 50mL over night. Patient appears to be doing well.

## 2021-03-07 NOTE — PLAN OF CARE
Problem: Adult Inpatient Plan of Care  Goal: Plan of Care Review  Recent Flowsheet Documentation  Taken 3/7/2021 1122 by Shelby Rojas, PT  Progress: improving  Plan of Care Reviewed With: patient  Outcome Summary: Pt showing improved functional endurance as he increased ambulation distance to 440ft with CGA and BUE support on tele monitor. Two standing rest breaks required during ambulation with cueing for PLB. Pt limited by pain, decreased functional endurance, and generalized weakness. Continue to progress as able. D/c rec for HWA remains appropriate at this time.

## 2021-03-07 NOTE — PROGRESS NOTES
Pulmonary/Critical Care Follow-up     LOS: 3 days   Patient Care Team:  Kreis, Samuel Duane, MD as PCP - General (Family Medicine)  Mabel Flores MD as Consulting Physician (Cardiology)    Chief Complaint: Valvular heart disease/medical management of issues including respiratory, glycemic, electrolyte management postoperatively after valve replacement.      Subjective      History of hospitalization (as of ICU note 3/5/2021):    65-year-old white male with a history of tobacco abuse, valvular heart disease, cardiomyopathy with ejection fraction less than 25%, and atrial fibrillation underwent aortic valve replacement with a #27 trifecta bioprosthetic tissue valve, replacement/repair of ascending aortic aneurysm with woven dacryon graft size 30 mm, Maze procedure with ablation of bilateral pulmonary veins on 3/3/2021.     He has been moved to the intensive care unit postoperatively.  Currently mechanically ventilated and sedated on propofol. On nitroglycerin, epinephrine, and norepinephrine drips.     Patient has a history of tobacco abuse, resolved in 2008.    (End of copied text).    Interval History:     Patient had an enlarging right-sided pneumothorax on chest x-ray this morning.  He is in atrial fibrillation.  He is requiring in and out catheterization.  Patient denies significant dyspnea.  Pain is adequately managed.    History taken from: Patient    PMH/FH/Social History were reviewed and updated appropriately in the electronic medical record.     Review of Systems:    Review of 14 systems was completed with positives and pertinent negatives noted in the subjective section.  All other systems reviewed and are negative.         Objective     Vital Signs  Temp:  [98.4 °F (36.9 °C)-98.8 °F (37.1 °C)] 98.4 °F (36.9 °C)  Heart Rate:  [58-94] 80  Resp:  [14-18] 14  BP: ()/(57-97) 99/69  03/05 0701 - 03/06 0700  In: 327.1 [P.O.:320; I.V.:7.1]  Out: 1915 [Urine:1675]  Body mass index is 35.67 kg/m².     IV  drips:  EPINEPHrine, Last Rate: Stopped (03/05/21 0900)  niCARdipine  nitroglycerin, Last Rate: Stopped (03/03/21 2030)  norepinephrine, Last Rate: 0.02 mcg/kg/min (03/03/21 1530)       Physical Exam:     Constitutional:   Alert, cooperative, in no acute distress   Head:   Normocephalic, without obvious abnormality, atraumatic   Eyes:           Lids and lashes normal, conjunctivae and sclerae normal.  No icterus, no pallor, corneas clear, PER   ENMT:  Ears appear intact with no abnormalities noted     No oral lesions, no thrush, oral mucosa moist   Neck:  No adenopathy, supple, trachea midline, no thyromegaly, no JVD   Lungs/Resp:    Normal effort, symmetric chest rise, no crepitus, diminished breath sounds on right, no chest wall tenderness                Heart/CV:    Irregularly irregular, normal S1 and S2, no            murmur   Abdomen/GI:    Normal bowel sounds, no masses, no organomegaly, soft        nontender, nondistended   :    Deferred   Extremities/MSK:  No clubbing or cyanosis.  No edema.  Normal tone.    No deformities.    Pulses:  Pulses palpable and equal bilaterally   Skin:  No bleeding, bruising or rash   Heme/Lymph:  No cervical or supraclavicular adenopathy.   Neurologic:    Psychiatric:    Moves all extremities with no obvious focal motor deficit.  Cranial nerves 2 - 12 grossly intact  Oriented x 3, normal affect.    The above physical exam findings were reviewed and reflect exam findings as of today's exam.   Electronically signed by:Conrad El MD 03/06/21 21:52 EST    Results Review:     I reviewed the patient's new clinical results.   Results from last 7 days   Lab Units 03/06/21  0355 03/05/21  0408 03/03/21  2353 03/01/21  1608   SODIUM mmol/L 132* 132* 137 144   POTASSIUM mmol/L 4.6 5.1 4.3 4.6   CHLORIDE mmol/L 96* 96* 101 105   CO2 mmol/L 29.0 28.0 27.0 29.0   BUN mg/dL 34* 22 25* 14   CREATININE mg/dL 1.23 1.10 1.85* 1.06   CALCIUM mg/dL 9.7 9.0 9.7 9.5   BILIRUBIN mg/dL  --    --   --  1.2   ALK PHOS U/L  --   --   --  96   ALT (SGPT) U/L  --   --   --  24   AST (SGOT) U/L  --   --   --  23   GLUCOSE mg/dL 141* 176* 148* 130*     Results from last 7 days   Lab Units 03/06/21  0355 03/05/21  0408 03/04/21  0530   WBC 10*3/mm3 14.45* 16.68* 13.21*   HEMOGLOBIN g/dL 10.8* 10.4* 10.3*   HEMATOCRIT % 33.0* 32.6* 31.9*   PLATELETS 10*3/mm3 135* 151 197     Results from last 7 days   Lab Units 03/03/21  1716   PH, ARTERIAL pH units 7.367   PO2 ART mm Hg 82.5*   PCO2, ARTERIAL mm Hg 45.6*   HCO3 ART mmol/L 26.1*     Results from last 7 days   Lab Units 03/05/21  0408 03/03/21  2353 03/03/21 2027 03/03/21  1546   MAGNESIUM mg/dL 2.2 1.7 1.8 1.7   PHOSPHORUS mg/dL  --  4.0 3.6 2.9       I reviewed the patient's new imaging including images and reports.    Chest x-ray 3/6/2021: Enlarging right-sided pneumothorax.    Medication Review:   aspirin, 325 mg, Oral, Daily  atorvastatin, 80 mg, Oral, Nightly  insulin lispro, 0-14 Units, Subcutaneous, TID AC  metoprolol tartrate, 12.5 mg, Oral, Q12H  pantoprazole, 40 mg, Oral, Q AM  sennosides-docusate, 2 tablet, Oral, BID      EPINEPHrine, 0.02-0.3 mcg/kg/min, Last Rate: Stopped (03/05/21 0900)  niCARdipine, 5-15 mg/hr  nitroglycerin, 5-200 mcg/min, Last Rate: Stopped (03/03/21 2030)  norepinephrine, 0.02-0.3 mcg/kg/min, Last Rate: 0.02 mcg/kg/min (03/03/21 1530)        Assessment/Plan         Acending thoracic aortic aneurysm (Repair/replace w/ woven Dacron graft size 30 mm 3/3/21)    New onset atrial fibrillation (S/p Maze w/ Pulmonary Vein Ablation 3/3/21))    Former smoker    COVID-19 virus (12/2020)    Bicuspid aortic valve (S/P 27 trifecta bioprosthetic tissue valve 3/3/21)    Cardiomyopathy (EF <25%)    65 y.o. male with history of tobacco abuse, valvular heart disease, cardiomyopathy with ejection fraction less than 25%, atrial fibrillation who underwent aortic valve replacement on 3/3/2021 with a trifecta bioprosthetic tissue valve,  replacement/repair of a sending aortic aneurysm, maze procedure with ablation of pulmonary veins.    The patient has an enlarging right-sided pneumothorax on chest x-ray this morning and diminished breath sounds on that side.    Plan:  1.  Place right-sided percutaneous chest tube.  2.  Management of atrial fibrillation per cardiology.  3.  Monitor renal function and electrolytes.  4.  Monitor respiratory status with supplemental oxygen as needed.    Conrad El MD  03/06/21  21:47 EST        *. Please note that portions of this note were completed with [employer] - a voice recognition program.

## 2021-03-07 NOTE — PLAN OF CARE
Goal Outcome Evaluation:  Plan of Care Reviewed With: family  Progress: improving  Outcome Summary: Pt doing well. In and out of afib/flutter/NS- Mds aware, no new orders, rate controlled. MT's and wires dc'd, pleural tube remains- small air leak. Ambulated twice, 440 feet each. Voiding without difficulty. Bowels moving. VSS. Anticipate orders to tele in AM.

## 2021-03-07 NOTE — PROGRESS NOTES
Cardiothoracic Surgery Progress Note      POD # 4 s/p AVR, ascending aortic aneurysm tube graft, pulmonary vein isolation     LOS: 4 days      Subjective:  Recurrent A.fib.  Some incisional pain.  No other complaints    Objective:  Vital Signs  Temp:  [98.1 °F (36.7 °C)-99.4 °F (37.4 °C)] 98.1 °F (36.7 °C)  Heart Rate:  [] 79  Resp:  [16-18] 16  BP: ()/(57-91) 106/91    Physical Exam:   General Appearance: alert, appears stated age and cooperative   Lungs: clear to auscultation, respirations regular, respirations even and respirations unlabored   Heart: regular rhythm & normal rate, normal S1, S2 and no murmur, no gallop, no rub   Skin: Incision c/d/i     Results:  Results from last 7 days   Lab Units 03/07/21  0347   WBC 10*3/mm3 10.96*   HEMOGLOBIN g/dL 10.5*   HEMATOCRIT % 32.4*   PLATELETS 10*3/mm3 136*     Results from last 7 days   Lab Units 03/07/21  0347   SODIUM mmol/L 135*   POTASSIUM mmol/L 4.3   CHLORIDE mmol/L 97*   CO2 mmol/L 29.0   BUN mg/dL 32*   CREATININE mg/dL 0.86   GLUCOSE mg/dL 119*   CALCIUM mg/dL 9.1       Assessment:  POD # 4 s/p AVR, ascending aortic aneurysm tube graft, pulmonary vein isolation    Plan:  DC chest tubes and wires  Keep right chest tube given tube placed postoperatively for pneumothorax.  Place to waterseal given resolved air leak for likely removal tomorrow  Recurrent A.fib as per cardiology.  No clip placed, should be anticoagulated for left atrial appendage clot    Harjinder Hollis MD  03/07/21  16:18 EST

## 2021-03-07 NOTE — THERAPY TREATMENT NOTE
Patient Name: Yeison Bryant  : 1955    MRN: [id number]                              Today's Date: 3/7/2021       Admit Date: 3/3/2021    Visit Dx:     ICD-10-CM ICD-9-CM   1. Postprocedural pneumothorax  J95.811 512.1   2. Thoracic aortic aneurysm without rupture (CMS/[facility])  I71.2 441.2   3. Bicuspid aortic valve  Q23.1 746.4     Patient Active Problem List   Diagnosis   • CHF (congestive heart failure) (CMS/[facility])   • New onset atrial fibrillation (S/p Maze w/ Pulmonary Vein Ablation 3/3/21))   • Former smoker   • COVID-19 virus (2020)   • Acending thoracic aortic aneurysm (Repair/replace w/ woven Dacron graft size 30 mm 3/3/21)   • Bicuspid aortic valve (S/P 27 trifecta bioprosthetic tissue valve 3/3/21)   • Cardiomyopathy (EF <25%)     Past Medical History:   Diagnosis Date   • Arrhythmia    • Arthritis    • Atrial fibrillation (CMS/[facility])    • Cataract    • CHF (congestive heart failure) (CMS/[facility]) 2021   • COVID-19 2020   • Dyslipidemia    • Former smoker    • History of gout    • History of pneumonia    • Hypertension    • Obesity (BMI 30-39.9)    • SOB (shortness of breath)      Past Surgical History:   Procedure Laterality Date   • AORTIC VALVE REPAIR/REPLACEMENT N/A 3/3/2021    Procedure: MEDIAN STERNOTOMY, MAZE, AORTIC VALVE REPLACEMENT WITH LEFT FEMORAL ARTERIAL CANNULATION;  Surgeon: Evgeny Guerrier MD;  Location: [facility] OR;  Service: Cardiothoracic;  Laterality: N/A;   • CARDIAC CATHETERIZATION Left 2021    Procedure: Left Heart Cath - COVID+ ;  Surgeon: Mahendra Waldron MD;  Location:  [facility] CATH INVASIVE LOCATION;  Service: Cardiology;  Laterality: Left;   • CARDIAC CATHETERIZATION N/A 2021    Procedure: Aortic root aortogram;  Surgeon: Mahendra Waldron MD;  Location:  [facility] CATH INVASIVE LOCATION;  Service: Cardiology;  Laterality: N/A;   • CATARACT EXTRACTION     • COLONOSCOPY     • EYE SURGERY      lasic and cataract   • KNEE SURGERY Bilateral    •  THORACIC AORTIC ANEURYSM REPAIR N/A 3/3/2021    Procedure: ASCENDING THORACIC AORTIC ANEURYSM REPAIR, DIONISIO PER ANESTHESIA;  Surgeon: Evgeny Guerrier MD;  Location: [facility];  Service: Cardiothoracic;  Laterality: N/A;   • TONSILLECTOMY       General Information     Row Name 03/07/21 1116          Physical Therapy Time and Intention    Document Type  therapy note (daily note)  -AY     Mode of Treatment  physical therapy;individual therapy  -AY     Row Name 03/07/21 1116          General Information    Patient Profile Reviewed  yes  -AY     Existing Precautions/Restrictions  cardiac;fall;oxygen therapy device and L/min;sternal  -AY     Row Name 03/07/21 1116          Cognition    Orientation Status (Cognition)  oriented x 4  -AY     Row Name 03/07/21 1116          Safety Issues, Functional Mobility    Impairments Affecting Function (Mobility)  balance;endurance/activity tolerance;shortness of breath;strength  -AY       User Key  (r) = Recorded By, (t) = Taken By, (c) = Cosigned By    Initials Name Provider Type    AY Shelby Rojas, MARTHA Physical Therapist        Mobility     Row Name 03/07/21 1117          Bed Mobility    Comment (Bed Mobility)  Pt recieved and left sitting up in chair  -AY     Row Name 03/07/21 1117          Transfers    Comment (Transfers)  Verbal cueing for sequencing and sternal precautions.  -AY     Row Name 03/07/21 1117          Sit-Stand Transfer    Sit-Stand Sierra (Transfers)  contact guard;verbal cues  -AY     Assistive Device (Sit-Stand Transfers)  other (see comments) gait belt, no AD  -AY     Row Name 03/07/21 1117          Gait/Stairs (Locomotion)    Sierra Level (Gait)  contact guard;verbal cues  -AY     Assistive Device (Gait)  other (see comments) BUE support on tele monitor  -AY     Distance in Feet (Gait)  440  -AY     Deviations/Abnormal Patterns (Gait)  stride length decreased;bilateral deviations;jj decreased;gait speed decreased  -AY     Bilateral Gait  Deviations  forward flexed posture;heel strike decreased  -AY     Comment (Gait/Stairs)  Pt ambulated 440ft with CGA and BUE support on tele monitor, demonstrating step through gait pattern with decreased jj. Two standing rest breaks required with cueing for PLB. Verbal cueing for posture, increased stride/step length, and B heel strike. Gait distance limited by fatigue.  -AY       User Key  (r) = Recorded By, (t) = Taken By, (c) = Cosigned By    Initials Name Provider Type    Shelby Walton PT Physical Therapist        Obj/Interventions     Row Name 03/07/21 1121          Balance    Balance Assessment  sitting static balance;sitting dynamic balance;standing static balance;standing dynamic balance  -AY     Static Sitting Balance  WFL;supported;sitting in chair  -AY     Dynamic Sitting Balance  WFL;unsupported;sitting in chair  -AY     Static Standing Balance  WFL;supported;standing  -AY     Dynamic Standing Balance  mild impairment;supported;standing  -AY     Balance Interventions  sitting;standing;sit to stand;supported;static  -AY       User Key  (r) = Recorded By, (t) = Taken By, (c) = Cosigned By    Initials Name Provider Type    Shelby Walton PT Physical Therapist        Goals/Plan    No documentation.       Clinical Impression     Row Name 03/07/21 1122          Pain    Additional Documentation  Pain Scale: Numbers Pre/Post-Treatment (Group)  -AY     Row Name 03/07/21 1122          Pain Scale: Numbers Pre/Post-Treatment    Pretreatment Pain Rating  0/10 - no pain  -AY     Posttreatment Pain Rating  0/10 - no pain  -AY     Pre/Posttreatment Pain Comment  Denied pain throughout PTx. RN reported she recently administered pain medication.  -AY     Pain Intervention(s)  Ambulation/increased activity;Repositioned  -AY     Row Name 03/07/21 1122          Plan of Care Review    Plan of Care Reviewed With  patient  -AY     Progress  improving  -AY     Outcome Summary  Pt showing improved functional  endurance as he increased ambulation distance to 440ft with CGA and BUE support on tele monitor. Two standing rest breaks required during ambulation with cueing for PLB. Pt limited by pain, decreased functional endurance, and generalized weakness. Continue to progress as able. D/c rec for HWA remains appropriate at this time.  -AY     Row Name 03/07/21 1122          Vital Signs    Pre Systolic BP Rehab  116  -AY     Pre Treatment Diastolic BP  83  -AY     Post Systolic BP Rehab  143  -AY     Post Treatment Diastolic BP  104 RN notifed.  -AY     Pretreatment Heart Rate (beats/min)  98  -AY     Posttreatment Heart Rate (beats/min)  90  -AY     Pre SpO2 (%)  94  -AY     O2 Delivery Pre Treatment  room air  -AY     Intra SpO2 (%)  94  -AY     O2 Delivery Intra Treatment  room air  -AY     Post SpO2 (%)  92  -AY     O2 Delivery Post Treatment  room air  -AY     Pre Patient Position  Sitting  -AY     Intra Patient Position  Standing  -AY     Post Patient Position  Sitting  -AY     Row Name 03/07/21 1122          Positioning and Restraints    Pre-Treatment Position  sitting in chair/recliner  -AY     Post Treatment Position  chair  -AY     In Chair  notified nsg;reclined;sitting;call light within reach;encouraged to call for assist;RUE elevated;LUE elevated;waffle cushion;legs elevated;heels elevated  -AY       User Key  (r) = Recorded By, (t) = Taken By, (c) = Cosigned By    Initials Name Provider Type    Shelby Walton, PT Physical Therapist        Outcome Measures     Row Name 03/07/21 1125          How much help from another person do you currently need...    Turning from your back to your side while in flat bed without using bedrails?  3  -AY     Moving from lying on back to sitting on the side of a flat bed without bedrails?  3  -AY     Moving to and from a bed to a chair (including a wheelchair)?  3  -AY     Standing up from a chair using your arms (e.g., wheelchair, bedside chair)?  3  -AY     Climbing 3-5 steps  with a railing?  3  -AY     To walk in hospital room?  3  -AY     AM-PAC 6 Clicks Score (PT)  18  -AY     Row Name 03/07/21 1125          Functional Assessment    Outcome Measure Options  AM-PAC 6 Clicks Basic Mobility (PT)  -AY       User Key  (r) = Recorded By, (t) = Taken By, (c) = Cosigned By    Initials Name Provider Type    Shelby Walton, MARTHA Physical Therapist        Physical Therapy Education                 Title: PT OT SLP Therapies (Done)     Topic: Physical Therapy (Done)     Point: Mobility training (Done)     Learning Progress Summary           Patient Acceptance, E,TB, VU,NR by AY at 3/7/2021 1126    Acceptance, E,TB, VU,NR by AY at 3/6/2021 1322    Acceptance, E, VU by JONES at 3/6/2021 0429    Acceptance, E, NR by JB1 at 3/5/2021 1010    Acceptance, E, NR by JB1 at 3/4/2021 1030   Family Acceptance, E,TB, VU,NR by AY at 3/6/2021 1322                   Point: Home exercise program (Done)     Learning Progress Summary           Patient Acceptance, E,TB, VU,NR by AY at 3/6/2021 1322    Acceptance, E, NR by JB1 at 3/5/2021 1010    Acceptance, E, NR by JB1 at 3/4/2021 1030   Family Acceptance, E,TB, VU,NR by AY at 3/6/2021 1322                   Point: Body mechanics (Done)     Learning Progress Summary           Patient Acceptance, E,TB, VU,NR by AY at 3/7/2021 1126    Acceptance, E,TB, VU,NR by AY at 3/6/2021 1322    Acceptance, E, VU by JONES at 3/6/2021 0429    Acceptance, E, NR by JB1 at 3/5/2021 1010    Acceptance, E, NR by JB1 at 3/4/2021 1030   Family Acceptance, E,TB, VU,NR by AY at 3/6/2021 1322                   Point: Precautions (Done)     Learning Progress Summary           Patient Acceptance, E,TB, VU,NR by AY at 3/7/2021 1126    Acceptance, E,TB, VU,NR by AY at 3/6/2021 1322    Acceptance, E, NR by JB1 at 3/5/2021 1010    Acceptance, E, NR by GEORGE at 3/4/2021 1030   Family Acceptance, E,TB, VU,NR by HUY at 3/6/2021 1322                               User Key     Initials Effective Dates Name  Provider Type Discipline    JB1 06/19/15 -  Cecilia Hughes PT Physical Therapist PT    JONES 06/16/16 -  Clint Avilez RN Registered Nurse Nurse     11/10/20 -  Shelby Rojas PT Physical Therapist PT              PT Recommendation and Plan     Plan of Care Reviewed With: patient  Progress: improving  Outcome Summary: Pt showing improved functional endurance as he increased ambulation distance to 440ft with CGA and BUE support on tele monitor. Two standing rest breaks required during ambulation with cueing for PLB. Pt limited by pain, decreased functional endurance, and generalized weakness. Continue to progress as able. D/c rec for HWA remains appropriate at this time.     Time Calculation:   PT Charges     Row Name 03/07/21 1126             Time Calculation    Start Time  0902  -AY      PT Received On  03/07/21  -AY         Time Calculation- PT    Total Timed Code Minutes- PT  29 minute(s)  -AY         Timed Charges    41471 - PT Therapeutic Activity Minutes  29  -AY        User Key  (r) = Recorded By, (t) = Taken By, (c) = Cosigned By    Initials Name Provider Type    AY Shelby Rojas, MARTHA Physical Therapist        Therapy Charges for Today     Code Description Service Date Service Provider Modifiers Qty    11120219303 HC PT THER PROC EA 15 MIN 3/6/2021 Shelby Rojas, PT GP 1    68274062337 HC PT THERAPEUTIC ACT EA 15 MIN 3/6/2021 Shelby Rojas, PT GP 1    04455096083 HC PT THERAPEUTIC ACT EA 15 MIN 3/7/2021 Shelby Rojas PT GP 2          PT G-Codes  Outcome Measure Options: AM-PAC 6 Clicks Basic Mobility (PT)  AM-PAC 6 Clicks Score (PT): 18    Shelby Rojas PT  3/7/2021

## 2021-03-07 NOTE — PROGRESS NOTES
[facility] at [facility]  PROGRESS NOTE      Date of Admission: 3/3/2021  Length of Stay: 4  Primary Care Physician: Kreis, Samuel Duane, MD    Chief Complaint: Follow-up aortic stenosis, atrial fibrillation.    Problem List:  1. Ascending aortic aneurysm  a. 5.6cm on CT chest   b. Replacement and repair of AAA with 30mm Dacron graft 3/3/2021, Dr. Guerrier  2. Low flow- low gradient Aortic stenosis  a. Right and LHC 1/25/21: severe global hypokinesia and EF 20%, mod-severe PAH with elevated LVEDP; cardiac output low at 4.9L/min, normal coronaries, dilated aortic root   b. DIONISIO 1/26/21: EF 21-25%, bicuspid aortic valve with mild AS, moderate functional MR, severe dilation of ascending aorta measuring 5.7cm, RV is mildly dilated   c. Dobutamine stress echo 1/27/21: Best diagnosis is low flow, low gradient aortic stenosis.  d. AV Replacement 27 trifecta bioprosthetic tissue valve, 3/3/2021 Dr. Guerrier  3. Atrial fibrillation   a. Diagnosed 1/18/21 at time of hospitalization in [facility]  b. CHADsVASC=2  c. Echo 1/19/21: EF 56-60%, LA is mildly increased, RVSP 42mmHg, moderate dilation of the aortic root   d. MAZE procedure with ablation of left/right superior and inferior pulmonary veins.  Unable to clip CHARLOTTE as DIONISIO demonstrated a clot within the appendage.  4. Covid 19 infection 12/27/20  5. History of tobacco abuse, inactive 2008  6. Probable ALESSANDRO   7. Obesity    Subjective      HPI: He is basically asymptomatic at rest.  Chest tubes will be removed this afternoon.  A pleural tube will remain in place as previously described.  Overnight the patient developed asymptomatic rate controlled atrial flutter.       Objective   Vital Signs:  Temp:  [98.1 °F (36.7 °C)-99.4 °F (37.4 °C)] 98.1 °F (36.7 °C)  Heart Rate:  [] 88  Resp:  [16-18] 16  BP: ()/(57-97) 116/83    Intake/Output Summary (Last 24 hours) at 3/7/2021 1030  Last data filed at 3/7/2021 0900  Gross per 24 hour   Intake 850 ml  "  Output 1060 ml   Net -210 ml     Flowsheet Rows      First Filed Value   Admission Height  188 cm (74\") Documented at 03/03/2021 0523   Admission Weight  126 kg (277 lb 12.8 oz) Documented at 03/03/2021 0523          Physical Exam:   HEENT: Fundoscopic deferred, otherwise unremarkable.  NECK: No Jugular venous distention, adenopathy, or thyromegaly noted.   HEART: No discrete PMI is noted. The 1st and 2nd heart sounds are normal, and no murmurs, gallops, rubs, clicks, or other sounds are noted. Stable median sternotomy.  LUNGS: Clear to auscultation.  ABDOMEN: Flat without evidence of organomegaly, masses, or tenderness.  NEUROLOGIC: No focal abnormalities involving strength or sensation are noted.   EXTREMITIES: No clubbing, cyanosis, or edema noted.         Results Review:   Results from last 7 days   Lab Units 03/07/21 0347 03/06/21  0355 03/05/21  0408 03/03/21 2353 03/03/21 2027 03/03/21  1546 03/03/21  1224   SODIUM mmol/L 135* 132* 132* 137 138 138 141   POTASSIUM mmol/L 4.3 4.6 5.1 4.3 4.3 4.2 5.5*   CHLORIDE mmol/L 97* 96* 96* 101 102 104 107   CO2 mmol/L 29.0 29.0 28.0 27.0 26.0 24.0 24.0   BUN mg/dL 32* 34* 22 25* 24* 22 21   CREATININE mg/dL 0.86 1.23 1.10 1.85* 1.81* 1.61* 1.20   GLUCOSE mg/dL 119* 141* 176* 148* 131* 177* 189*   CALCIUM mg/dL 9.1 9.7 9.0 9.7 9.5 9.2 9.4         Results from last 7 days   Lab Units 03/07/21  0347 03/06/21  0355 03/05/21  0408 03/04/21  0530 03/03/21 2353 03/03/21 2027 03/03/21  1546   WBC 10*3/mm3 10.96* 14.45* 16.68* 13.21* 14.29* 13.91* 10.94*   HEMOGLOBIN g/dL 10.5* 10.8* 10.4* 10.3* 10.5* 10.6* 10.2*   HEMATOCRIT % 32.4* 33.0* 32.6* 31.9* 32.2* 33.3* 32.0*   PLATELETS 10*3/mm3 136* 135* 151 197 207 195 174     Results from last 7 days   Lab Units 03/04/21  0853 03/03/21  1224 03/01/21  1608   INR  1.22* 1.01 0.98   APTT seconds  --  36.1 32.5     Results from last 7 days   Lab Units 03/05/21  0408   MAGNESIUM mg/dL 2.2             Results from last 7 days " "  Lab Units 03/01/21  1608   HEMOGLOBIN A1C % 5.90*           Current Medications:  aspirin, 325 mg, Oral, Daily  atorvastatin, 80 mg, Oral, Nightly  insulin lispro, 0-14 Units, Subcutaneous, TID AC  metoprolol tartrate, 12.5 mg, Oral, Q12H  pantoprazole, 40 mg, Oral, Q AM  sennosides-docusate, 2 tablet, Oral, BID  tamsulosin, 0.4 mg, Oral, Daily      EPINEPHrine, 0.02-0.3 mcg/kg/min, Last Rate: Stopped (03/05/21 0900)  niCARdipine, 5-15 mg/hr  nitroglycerin, 5-200 mcg/min, Last Rate: Stopped (03/03/21 2030)  norepinephrine, 0.02-0.3 mcg/kg/min, Last Rate: 0.02 mcg/kg/min (03/03/21 1530)          I reviewed the patient's new clinical results.  I personally viewed and interpreted the patient's EKG/Telemetry data    Assessment and Plan:        1. Low Flow, Low Gradient Severe Aortic Stenosis  -  EF by DIONISIO 1/26/21 21-25%.  Bicuspid aortic valve.  Normal coronaries by [facility]  -  POD2 Aortic Valve Replacement using #27 trifecta bioprosthetic tissue valve, with repair of ascending aneurysm using 30mm Dacron graft, Dr. Guerrier.  Per CTS.   - PM \"off\" with only first degree AV block. To floor when tubes out.     2. PAF  - CHADsVASC=2, on Eliquis prior to surgery  - s/o MAZE procedure; could not deliver CHARLOTTE clip as thrombus seen in CHARLOTTE by DIONISIO intra-op   - needs anticoagulation when OK with CTS   - recurrent A flutter with CVR 3/6.     3. Leukocytosis   - per intensivists     4. Ulnar neuropathy               - Observation only for today. Improved per patient.     5. Worsening right pneumothorax: Pleural tube placed 3/7.      Mahendra Waldron MD  03/07/21  10:30 EST  "

## 2021-03-07 NOTE — PROGRESS NOTES
Pulmonary/Critical Care Follow-up     LOS: 4 days   Patient Care Team:  Kreis, Samuel Duane, MD as PCP - General (Family Medicine)  Mabel Flores MD as Consulting Physician (Cardiology)    Chief Complaint: Valvular heart disease/medical management of issues including respiratory, glycemic, electrolyte management postoperatively after valve replacement.      Subjective      History of hospitalization (as of ICU note 3/5/2021):    65-year-old white male with a history of tobacco abuse, valvular heart disease, cardiomyopathy with ejection fraction less than 25%, and atrial fibrillation underwent aortic valve replacement with a #27 trifecta bioprosthetic tissue valve, replacement/repair of ascending aortic aneurysm with woven dacryon graft size 30 mm, Maze procedure with ablation of bilateral pulmonary veins on 3/3/2021.     He has been moved to the intensive care unit postoperatively.  Currently mechanically ventilated and sedated on propofol. On nitroglycerin, epinephrine, and norepinephrine drips.     Patient has a history of tobacco abuse, resolved in 2008.    (End of copied text).    Interval History:     Chest tubes and pacing wires removed per cardiothoracic surgery.  Patient continues to have atrial fibrillation.  He is having some urine output, but has had some issues with retention.  No fevers or chills.  No nausea or vomiting.    History taken from: Patient    PMH/FH/Social History were reviewed and updated appropriately in the electronic medical record.     Review of Systems:    Review of 14 systems was completed with positives and pertinent negatives noted in the subjective section.  All other systems reviewed and are negative.         Objective     Vital Signs  Temp:  [98.1 °F (36.7 °C)-99.4 °F (37.4 °C)] 98.1 °F (36.7 °C)  Heart Rate:  [] 79  Resp:  [16-18] 16  BP: ()/(57-91) 106/91  03/06 0701 - 03/07 0700  In: 1050 [P.O.:1050]  Out: 1030 [Urine:900]  Body mass index is 35.67 kg/m².     IV  drips:  EPINEPHrine, Last Rate: Stopped (03/05/21 0900)  niCARdipine  nitroglycerin, Last Rate: Stopped (03/03/21 2030)  norepinephrine, Last Rate: 0.02 mcg/kg/min (03/03/21 1530)       Physical Exam:     Constitutional:   Alert, cooperative, in no acute distress   Head:   Normocephalic, without obvious abnormality, atraumatic   Eyes:           Lids and lashes normal, conjunctivae and sclerae normal.  No icterus, no pallor, corneas clear, PER   ENMT:  Ears appear intact with no abnormalities noted     No oral lesions, no thrush, oral mucosa moist   Neck:  No adenopathy, supple, trachea midline, no thyromegaly, no JVD   Lungs/Resp:    Normal effort, symmetric chest rise, no crepitus, diminished breath sounds on right, no chest wall tenderness, right anterior chest tube in place without air leak.                Heart/CV:    Irregularly irregular, normal S1 and S2, no            murmur   Abdomen/GI:    Normal bowel sounds, no masses, no organomegaly, soft        nontender, nondistended   :    Deferred   Extremities/MSK:  No clubbing or cyanosis.  No edema.  Normal tone.    No deformities.    Pulses:  Pulses palpable and equal bilaterally   Skin:  No bleeding, bruising or rash   Heme/Lymph:  No cervical or supraclavicular adenopathy.   Neurologic:    Psychiatric:    Moves all extremities with no obvious focal motor deficit.  Cranial nerves 2 - 12 grossly intact  Oriented x 3, normal affect.    The above physical exam findings were reviewed and reflect exam findings as of today's exam.   Electronically signed by:Conrad El MD 03/07/21 15:30 EST    Results Review:     I reviewed the patient's new clinical results.   Results from last 7 days   Lab Units 03/07/21  0347 03/06/21  0355 03/05/21  0408 03/01/21  1608   SODIUM mmol/L 135* 132* 132* 144   POTASSIUM mmol/L 4.3 4.6 5.1 4.6   CHLORIDE mmol/L 97* 96* 96* 105   CO2 mmol/L 29.0 29.0 28.0 29.0   BUN mg/dL 32* 34* 22 14   CREATININE mg/dL 0.86 1.23 1.10 1.06    CALCIUM mg/dL 9.1 9.7 9.0 9.5   BILIRUBIN mg/dL  --   --   --  1.2   ALK PHOS U/L  --   --   --  96   ALT (SGPT) U/L  --   --   --  24   AST (SGOT) U/L  --   --   --  23   GLUCOSE mg/dL 119* 141* 176* 130*     Results from last 7 days   Lab Units 03/07/21  0347 03/06/21  0355 03/05/21  0408   WBC 10*3/mm3 10.96* 14.45* 16.68*   HEMOGLOBIN g/dL 10.5* 10.8* 10.4*   HEMATOCRIT % 32.4* 33.0* 32.6*   PLATELETS 10*3/mm3 136* 135* 151     Results from last 7 days   Lab Units 03/03/21  1716   PH, ARTERIAL pH units 7.367   PO2 ART mm Hg 82.5*   PCO2, ARTERIAL mm Hg 45.6*   HCO3 ART mmol/L 26.1*     Results from last 7 days   Lab Units 03/05/21  0408 03/03/21  2353 03/03/21 2027 03/03/21  1546   MAGNESIUM mg/dL 2.2 1.7 1.8 1.7   PHOSPHORUS mg/dL  --  4.0 3.6 2.9       I reviewed the patient's new imaging including images and reports.    Chest x-ray 3/7/2021: Right sided pigtail catheter in place with no definite residual pneumothorax.  Left-sided chest tube remains in place.  Right internal jugular catheter in place.  Minimal basilar atelectasis.    Chest x-ray 3/6/2021: Enlarging right-sided pneumothorax.    Medication Review:   aspirin, 325 mg, Oral, Daily  atorvastatin, 80 mg, Oral, Nightly  metoprolol tartrate, 12.5 mg, Oral, Q12H  pantoprazole, 40 mg, Oral, Q AM  sennosides-docusate, 2 tablet, Oral, BID  tamsulosin, 0.4 mg, Oral, Daily      EPINEPHrine, 0.02-0.3 mcg/kg/min, Last Rate: Stopped (03/05/21 0900)  niCARdipine, 5-15 mg/hr  nitroglycerin, 5-200 mcg/min, Last Rate: Stopped (03/03/21 2030)  norepinephrine, 0.02-0.3 mcg/kg/min, Last Rate: 0.02 mcg/kg/min (03/03/21 1530)        Assessment/Plan         Acending thoracic aortic aneurysm (Repair/replace w/ woven Dacron graft size 30 mm 3/3/21)    New onset atrial fibrillation (S/p Maze w/ Pulmonary Vein Ablation 3/3/21))    Former smoker    COVID-19 virus (12/2020)    Bicuspid aortic valve (S/P 27 trifecta bioprosthetic tissue valve 3/3/21)    Cardiomyopathy (EF  <25%)    65 y.o. male with history of tobacco abuse, valvular heart disease, cardiomyopathy with ejection fraction less than 25%, atrial fibrillation who underwent aortic valve replacement on 3/3/2021 with a trifecta bioprosthetic tissue valve, replacement/repair of a sending aortic aneurysm, maze procedure with ablation of pulmonary veins.    The patient has an enlarging right-sided pneumothorax on chest x-ray the morning of 3/6/2021.  I placed a right anterior pigtail catheter at the bedside with resolution of pneumothorax.      Patient had his pacing wires and a large bore chest tubes removed this morning by cardiothoracic surgery.    Plan:  1.  Place right-sided percutaneous chest tube.  2.  Management of atrial fibrillation per cardiology.  3.  Okay to telemetry from pulmonary standpoint.  4.  BMP, magnesium in a.m.    Conrad El MD  03/07/21  15:30 EST        *. Please note that portions of this note were completed with [employer] - a voice recognition program.

## 2021-03-08 ENCOUNTER — APPOINTMENT (OUTPATIENT)
Dept: GENERAL RADIOLOGY | Facility: HOSPITAL | Age: 66
End: 2021-03-08

## 2021-03-08 LAB
ANION GAP SERPL CALCULATED.3IONS-SCNC: 7 MMOL/L (ref 5–15)
BUN SERPL-MCNC: 25 MG/DL (ref 8–23)
BUN/CREAT SERPL: 32.1 (ref 7–25)
CALCIUM SPEC-SCNC: 9.2 MG/DL (ref 8.6–10.5)
CHLORIDE SERPL-SCNC: 100 MMOL/L (ref 98–107)
CO2 SERPL-SCNC: 29 MMOL/L (ref 22–29)
CREAT SERPL-MCNC: 0.78 MG/DL (ref 0.76–1.27)
GFR SERPL CREATININE-BSD FRML MDRD: 100 ML/MIN/1.73
GLUCOSE BLDC GLUCOMTR-MCNC: 119 MG/DL (ref 70–130)
GLUCOSE SERPL-MCNC: 120 MG/DL (ref 65–99)
MAGNESIUM SERPL-MCNC: 2 MG/DL (ref 1.6–2.4)
POTASSIUM SERPL-SCNC: 4.1 MMOL/L (ref 3.5–5.2)
QT INTERVAL: 412 MS
QTC INTERVAL: 466 MS
SODIUM SERPL-SCNC: 136 MMOL/L (ref 136–145)

## 2021-03-08 PROCEDURE — 80048 BASIC METABOLIC PNL TOTAL CA: CPT | Performed by: INTERNAL MEDICINE

## 2021-03-08 PROCEDURE — 71045 X-RAY EXAM CHEST 1 VIEW: CPT

## 2021-03-08 PROCEDURE — 99232 SBSQ HOSP IP/OBS MODERATE 35: CPT | Performed by: INTERNAL MEDICINE

## 2021-03-08 PROCEDURE — 97116 GAIT TRAINING THERAPY: CPT

## 2021-03-08 PROCEDURE — 99024 POSTOP FOLLOW-UP VISIT: CPT | Performed by: THORACIC SURGERY (CARDIOTHORACIC VASCULAR SURGERY)

## 2021-03-08 PROCEDURE — 83735 ASSAY OF MAGNESIUM: CPT | Performed by: INTERNAL MEDICINE

## 2021-03-08 RX ORDER — SODIUM CHLORIDE 0.9 % (FLUSH) 0.9 %
10 SYRINGE (ML) INJECTION EVERY 12 HOURS SCHEDULED
Status: DISCONTINUED | OUTPATIENT
Start: 2021-03-08 | End: 2021-03-09 | Stop reason: HOSPADM

## 2021-03-08 RX ORDER — ACETAMINOPHEN 325 MG/1
650 TABLET ORAL EVERY 6 HOURS PRN
Status: DISCONTINUED | OUTPATIENT
Start: 2021-03-08 | End: 2021-03-09 | Stop reason: HOSPADM

## 2021-03-08 RX ORDER — DOCUSATE SODIUM 100 MG/1
100 CAPSULE, LIQUID FILLED ORAL 2 TIMES DAILY PRN
Status: DISCONTINUED | OUTPATIENT
Start: 2021-03-08 | End: 2021-03-09 | Stop reason: HOSPADM

## 2021-03-08 RX ORDER — SODIUM CHLORIDE 0.9 % (FLUSH) 0.9 %
10 SYRINGE (ML) INJECTION EVERY 8 HOURS SCHEDULED
Status: DISCONTINUED | OUTPATIENT
Start: 2021-03-08 | End: 2021-03-09 | Stop reason: HOSPADM

## 2021-03-08 RX ORDER — ASPIRIN 81 MG/1
81 TABLET ORAL DAILY
Status: DISCONTINUED | OUTPATIENT
Start: 2021-03-09 | End: 2021-03-09 | Stop reason: HOSPADM

## 2021-03-08 RX ORDER — BISACODYL 10 MG
10 SUPPOSITORY, RECTAL RECTAL DAILY PRN
Status: DISCONTINUED | OUTPATIENT
Start: 2021-03-08 | End: 2021-03-09 | Stop reason: HOSPADM

## 2021-03-08 RX ORDER — CARVEDILOL 6.25 MG/1
6.25 TABLET ORAL 2 TIMES DAILY WITH MEALS
Status: DISCONTINUED | OUTPATIENT
Start: 2021-03-08 | End: 2021-03-08

## 2021-03-08 RX ORDER — CARVEDILOL 12.5 MG/1
12.5 TABLET ORAL 2 TIMES DAILY WITH MEALS
Status: DISCONTINUED | OUTPATIENT
Start: 2021-03-08 | End: 2021-03-09 | Stop reason: HOSPADM

## 2021-03-08 RX ORDER — AMOXICILLIN 250 MG
2 CAPSULE ORAL 2 TIMES DAILY PRN
Status: DISCONTINUED | OUTPATIENT
Start: 2021-03-08 | End: 2021-03-09 | Stop reason: HOSPADM

## 2021-03-08 RX ORDER — BISACODYL 5 MG/1
10 TABLET, DELAYED RELEASE ORAL DAILY PRN
Status: DISCONTINUED | OUTPATIENT
Start: 2021-03-08 | End: 2021-03-09 | Stop reason: HOSPADM

## 2021-03-08 RX ADMIN — PANTOPRAZOLE SODIUM 40 MG: 40 TABLET, DELAYED RELEASE ORAL at 06:02

## 2021-03-08 RX ADMIN — METOPROLOL TARTRATE 12.5 MG: 25 TABLET, FILM COATED ORAL at 08:07

## 2021-03-08 RX ADMIN — APIXABAN 5 MG: 5 TABLET, FILM COATED ORAL at 20:45

## 2021-03-08 RX ADMIN — TAMSULOSIN HYDROCHLORIDE 0.4 MG: 0.4 CAPSULE ORAL at 08:08

## 2021-03-08 RX ADMIN — CARVEDILOL 12.5 MG: 12.5 TABLET, FILM COATED ORAL at 18:42

## 2021-03-08 RX ADMIN — SODIUM CHLORIDE, PRESERVATIVE FREE 10 ML: 5 INJECTION INTRAVENOUS at 10:27

## 2021-03-08 RX ADMIN — ACETAMINOPHEN 650 MG: 325 TABLET ORAL at 13:42

## 2021-03-08 RX ADMIN — SODIUM CHLORIDE, PRESERVATIVE FREE 10 ML: 5 INJECTION INTRAVENOUS at 20:44

## 2021-03-08 RX ADMIN — ASPIRIN 325 MG: 325 TABLET, COATED ORAL at 08:08

## 2021-03-08 RX ADMIN — ATORVASTATIN CALCIUM 80 MG: 40 TABLET, FILM COATED ORAL at 20:45

## 2021-03-08 RX ADMIN — SODIUM CHLORIDE, PRESERVATIVE FREE 10 ML: 5 INJECTION INTRAVENOUS at 15:26

## 2021-03-08 NOTE — PROGRESS NOTES
Critical Care Note     LOS: 5 days   Patient Care Team:  Kreis, Samuel Duane, MD as PCP - General (Family Medicine)  Mabel Flores MD as Consulting Physician (Cardiology)    Chief Complaint/Reason for visit:      Thoracic aortic aneurysm repair  Aortic valve replacement  Atrial fibrillation  COVID-19 viral infection December 2020      Subjective     65-year-old gentleman, former smoker with valvular heart disease, cardiomyopathy, EF 25% with atrial fibrillation who underwent aortic valve replacement with a #27 trifecta bioprosthetic tissue valve and replacement/repair of ascending aortic aneurysm with woven Dacron graft, Maze procedure with ablation of bilateral pulmonary veins on March 3, 2021.  In the immediate postoperative course he required mechanical ventilation, epinephrine and norepinephrine drips.  His hemodynamics improved and he was extubated.  On March 5 he developed a right pneumothorax.  Chest tube was placed with resolution.    Chest tubes and pacing wires were removed March 7.    Interval History:     Currently sitting in the chair on room air and in sinus rhythm.  Tolerating a p.o. diet.  Oxygen saturation 93%  Urine output 2075mls  Hemoglobin 10.5, potassium 4.1  Right pigtail chest tube removed this morning    Review of Systems:    All systems were reviewed and negative except as noted in subjective.    Medical history, surgical history, social history, family history reviewed    Objective     Intake/Output:    Intake/Output Summary (Last 24 hours) at 3/8/2021 0813  Last data filed at 3/8/2021 0400  Gross per 24 hour   Intake 600 ml   Output 2075 ml   Net -1475 ml       Nutrition:  Diet Regular; Thin; Consistent Carbohydrate, Cardiac    Infusions:       Mechanical Ventilator Settings:            Resp Rate (Set): 16  Pressure Support (cm H2O): 10 cm H20  FiO2 (%): 35 %  PEEP/CPAP (cm H2O): 5 cm H20    Minute Ventilation (L/min) (Obs): 8.31 L/min  Resp Rate (Observed) Vent: 19  I:E Ratio (Set):  "1:2.41  I:E Ratio (Obs): 1:6.80    PIP Observed (cm H2O): 15 cm H2O  Plateau Pressure (cm H2O): 0 cm H2O    Telemetry: Sinus rhythm             Vital Signs  Blood pressure 114/95, pulse 93, temperature 99 °F (37.2 °C), temperature source Oral, resp. rate 18, height 188 cm (74\"), weight 126 kg (277 lb 12.8 oz), SpO2 93 %.    Physical Exam:  General Appearance:   Well-developed middle-aged gentleman sitting in the chair in no distress   Head:   Normocephalic, atraumatic   Eyes:          Pupils are equal and reactive to light, no jaundice   Ears:     Throat:  Oral mucosa moist   Neck:  Right IJ introducer.  Trachea midline   Back:      Lungs:    Sternotomy incision intact.  Breath sounds are bilateral without wheeze or rhonchi    Heart:   Regular rhythm, S1, S2 auscultated, no murmur   Abdomen:    Bowel sounds present, nondistended, soft   Rectal:   Deferred   Extremities:  No pretibial edema or cyanosis   Pulses:  Pedal pulses present   Skin:  Warm and dry   Lymph nodes:    Neurologic:  Alert and oriented,  symmetric, speech fluent      Results Review:     I reviewed the patient's new clinical results.   Results from last 7 days   Lab Units 03/08/21  0408 03/07/21 0347 03/06/21 0355 03/01/21  1608   SODIUM mmol/L 136 135* 132* 144   POTASSIUM mmol/L 4.1 4.3 4.6 4.6   CHLORIDE mmol/L 100 97* 96* 105   CO2 mmol/L 29.0 29.0 29.0 29.0   BUN mg/dL 25* 32* 34* 14   CREATININE mg/dL 0.78 0.86 1.23 1.06   CALCIUM mg/dL 9.2 9.1 9.7 9.5   BILIRUBIN mg/dL  --   --   --  1.2   ALK PHOS U/L  --   --   --  96   ALT (SGPT) U/L  --   --   --  24   AST (SGOT) U/L  --   --   --  23   GLUCOSE mg/dL 120* 119* 141* 130*     Results from last 7 days   Lab Units 03/07/21  0347 03/06/21  0355 03/05/21  0408   WBC 10*3/mm3 10.96* 14.45* 16.68*   HEMOGLOBIN g/dL 10.5* 10.8* 10.4*   HEMATOCRIT % 32.4* 33.0* 32.6*   PLATELETS 10*3/mm3 136* 135* 151     Results from last 7 days   Lab Units 03/03/21  1716   PH, ARTERIAL pH units 7.367   PO2 " ART mm Hg 82.5*   PCO2, ARTERIAL mm Hg 45.6*   HCO3 ART mmol/L 26.1*     Lab Results   Component Value Date    BLOODCX No growth at 5 days 01/18/2021    BLOODCX No growth at 5 days 01/18/2021     No results found for: URINECX    I reviewed the patient's new imaging including images and reports.  Right pigtail catheter in place.  No visible pneumothorax.  Cardiomegaly is present.  No pulmonary edema    All medications reviewed.   aspirin, 325 mg, Oral, Daily  atorvastatin, 80 mg, Oral, Nightly  metoprolol tartrate, 12.5 mg, Oral, Q12H  pantoprazole, 40 mg, Oral, Q AM  sennosides-docusate, 2 tablet, Oral, BID  tamsulosin, 0.4 mg, Oral, Daily          Assessment/Plan       Acending thoracic aortic aneurysm (Repair/replace w/ woven Dacron graft size 30 mm 3/3/21)    New onset atrial fibrillation (S/p Maze w/ Pulmonary Vein Ablation 3/3/21))    COVID-19 virus (12/2020)    Bicuspid aortic valve (S/P 27 trifecta bioprosthetic tissue valve 3/3/21)    Cardiomyopathy (EF <25%)    Former smoker    #1 ascending aortic aneurysm status post repair with aortic valve replacement.  Incision is intact without evidence of erythema or drainage.  This morning his hemoglobin is 10.5.  He did not require blood products.    #2 atrial fibrillation status post maze with pulmonary vein ablations.  He remains in sinus rhythm    #3 cardiomyopathy with EF less than 25%.  Currently he is compensated with no pulmonary edema    #4 COVID-19 infection December 2020, remains afebrile and on room air    PLAN:    Aspirin, statin, beta-blocker  Ambulate with therapy  Blood glucoses 120-130, can DC insulin  Telemetry        VTE Prophylaxis: Foot pumps    Stress Ulcer Prophylaxis: None    Kailey Diop MD  03/08/21  08:13 EST      Time: 25 minutes

## 2021-03-08 NOTE — THERAPY TREATMENT NOTE
Patient Name: Yeison Bryant  : 1955    MRN: [id number]                              Today's Date: 3/8/2021       Admit Date: 3/3/2021    Visit Dx:     ICD-10-CM ICD-9-CM   1. Postprocedural pneumothorax  J95.811 512.1   2. Thoracic aortic aneurysm without rupture (CMS/[facility])  I71.2 441.2   3. Bicuspid aortic valve  Q23.1 746.4     Patient Active Problem List   Diagnosis   • CHF (congestive heart failure) (CMS/[facility])   • New onset atrial fibrillation (S/p Maze w/ Pulmonary Vein Ablation 3/3/21))   • Former smoker   • COVID-19 virus (2020)   • Acending thoracic aortic aneurysm (Repair/replace w/ woven Dacron graft size 30 mm 3/3/21)   • Bicuspid aortic valve (S/P 27 trifecta bioprosthetic tissue valve 3/3/21)   • Cardiomyopathy (EF <25%)     Past Medical History:   Diagnosis Date   • Arrhythmia    • Arthritis    • Atrial fibrillation (CMS/[facility])    • Cataract    • CHF (congestive heart failure) (CMS/[facility]) 2021   • COVID-19 2020   • Dyslipidemia    • Former smoker    • History of gout    • History of pneumonia    • Hypertension    • Obesity (BMI 30-39.9)    • SOB (shortness of breath)      Past Surgical History:   Procedure Laterality Date   • AORTIC VALVE REPAIR/REPLACEMENT N/A 3/3/2021    Procedure: MEDIAN STERNOTOMY, MAZE, AORTIC VALVE REPLACEMENT WITH LEFT FEMORAL ARTERIAL CANNULATION;  Surgeon: Evgeny Guerrier MD;  Location: [facility] OR;  Service: Cardiothoracic;  Laterality: N/A;   • CARDIAC CATHETERIZATION Left 2021    Procedure: Left Heart Cath - COVID+ ;  Surgeon: Mahendra Waldron MD;  Location:  [facility] CATH INVASIVE LOCATION;  Service: Cardiology;  Laterality: Left;   • CARDIAC CATHETERIZATION N/A 2021    Procedure: Aortic root aortogram;  Surgeon: Mahendra Waldron MD;  Location:  [facility] CATH INVASIVE LOCATION;  Service: Cardiology;  Laterality: N/A;   • CATARACT EXTRACTION     • COLONOSCOPY     • EYE SURGERY      lasic and cataract   • KNEE SURGERY Bilateral    •  THORACIC AORTIC ANEURYSM REPAIR N/A 3/3/2021    Procedure: ASCENDING THORACIC AORTIC ANEURYSM REPAIR, DIONISIO PER ANESTHESIA;  Surgeon: Evgeny Guerrier MD;  Location: [facility];  Service: Cardiothoracic;  Laterality: N/A;   • TONSILLECTOMY       General Information     Row Name 03/08/21 1039          Physical Therapy Time and Intention    Document Type  therapy note (daily note)  -KG     Mode of Treatment  physical therapy  -KG     Row Name 03/08/21 1039          General Information    Existing Precautions/Restrictions  cardiac;fall;sternal  -KG     Row Name 03/08/21 1039          Cognition    Orientation Status (Cognition)  oriented x 4  -KG     Row Name 03/08/21 1039          Safety Issues, Functional Mobility    Safety Issues Affecting Function (Mobility)  insight into deficits/self-awareness;safety precaution awareness;safety precautions follow-through/compliance  -KG     Impairments Affecting Function (Mobility)  balance;endurance/activity tolerance;postural/trunk control;strength  -KG       User Key  (r) = Recorded By, (t) = Taken By, (c) = Cosigned By    Initials Name Provider Type    KG Elise Washington, PT Physical Therapist        Mobility     Row Name 03/08/21 1039          Bed Mobility    Comment (Bed Mobility)  UIC  -KG     Row Name 03/08/21 1039          Transfers    Comment (Transfers)  VC's for sequencing. Pt able to maintain sternal precautions without cues.  -KG     Row Name 03/08/21 1039          Sit-Stand Transfer    Sit-Stand [place] (Transfers)  contact guard;verbal cues  -KG     Row Name 03/08/21 1039          Gait/Stairs (Locomotion)    [place] Level (Gait)  contact guard;verbal cues  -KG     Assistive Device (Gait)  other (see comments) B UE support on tele monitor  -KG     Distance in Feet (Gait)  660  -KG     Deviations/Abnormal Patterns (Gait)  jj decreased;stride length decreased  -KG     Bilateral Gait Deviations  forward flexed posture  -KG     Comment (Gait/Stairs)   Pt demonstrated step through gait pattern with slow jj and decreased step length. VC's for upright posture. Pt demonstrated good stability with no LOB. Required one standing rest break. Denied any c/o pain.  -KG       User Key  (r) = Recorded By, (t) = Taken By, (c) = Cosigned By    Initials Name Provider Type    Elise Davis PT Physical Therapist        Obj/Interventions     Row Name 03/08/21 1041          Balance    Balance Assessment  sitting static balance;standing static balance;standing dynamic balance  -KG     Static Sitting Balance  WNL;sitting in chair  -KG     Static Standing Balance  WFL;unsupported;standing  -KG     Dynamic Standing Balance  WFL;supported;standing  -KG       User Key  (r) = Recorded By, (t) = Taken By, (c) = Cosigned By    Initials Name Provider Type    Elise Davis PT Physical Therapist        Goals/Plan    No documentation.       Clinical Impression     Row Name 03/08/21 1041          Pain    Additional Documentation  Pain Scale: Numbers Pre/Post-Treatment (Group)  -KG     Row Name 03/08/21 1041          Pain Scale: Numbers Pre/Post-Treatment    Pretreatment Pain Rating  0/10 - no pain  -KG     Posttreatment Pain Rating  0/10 - no pain  -KG     Row Name 03/08/21 1041          Plan of Care Review    Plan of Care Reviewed With  patient  -KG     Progress  improving  -KG     Outcome Summary  Pt increased ambulation distance to 660ft with CGA and B UE support. VC's for upright posture and increased stride length. Pt demonstrated good stability with no LOB. Required one standing rest break. Denied any c/o pain. Continue to progress as appropriate.  -KG     Row Name 03/08/21 1041          Vital Signs    Pre Systolic BP Rehab  114  -KG     Pre Treatment Diastolic BP  95  -KG     Pretreatment Heart Rate (beats/min)  87  -KG     Posttreatment Heart Rate (beats/min)  101  -KG     Pre SpO2 (%)  96  -KG     O2 Delivery Pre Treatment  room air  -KG     Post SpO2 (%)   95  -KG     O2 Delivery Post Treatment  room air  -KG     Pre Patient Position  Sitting  -KG     Intra Patient Position  Standing  -KG     Post Patient Position  Sitting  -KG     Row Name 03/08/21 1041          Positioning and Restraints    Pre-Treatment Position  sitting in chair/recliner  -KG     Post Treatment Position  chair  -KG     In Chair  notified nsg;reclined;call light within reach;encouraged to call for assist;RUE elevated;LUE elevated;legs elevated  -KG       User Key  (r) = Recorded By, (t) = Taken By, (c) = Cosigned By    Initials Name Provider Type    Elise Davis PT Physical Therapist        Outcome Measures     Row Name 03/08/21 1043          How much help from another person do you currently need...    Turning from your back to your side while in flat bed without using bedrails?  3  -KG     Moving from lying on back to sitting on the side of a flat bed without bedrails?  3  -KG     Moving to and from a bed to a chair (including a wheelchair)?  3  -KG     Standing up from a chair using your arms (e.g., wheelchair, bedside chair)?  3  -KG     Climbing 3-5 steps with a railing?  3  -KG     To walk in hospital room?  3  -KG     AM-PAC 6 Clicks Score (PT)  18  -KG     Row Name 03/08/21 1043          Functional Assessment    Outcome Measure Options  AM-PAC 6 Clicks Basic Mobility (PT)  -KG       User Key  (r) = Recorded By, (t) = Taken By, (c) = Cosigned By    Initials Name Provider Type    Elise Davis PT Physical Therapist        Physical Therapy Education                 Title: PT OT SLP Therapies (Done)     Topic: Physical Therapy (Done)     Point: Mobility training (Done)     Learning Progress Summary           Patient Acceptance, E, VU,NR by KG at 3/8/2021 0821    Acceptance, E,TB, VU,NR by AY at 3/7/2021 1126    Acceptance, E,TB, VU,NR by AY at 3/6/2021 1322    Acceptance, E, VU by JONES at 3/6/2021 0429    Acceptance, E, NR by JB1 at 3/5/2021 1010    Acceptance, E, NR by  JB1 at 3/4/2021 1030   Family Acceptance, E,TB, VU,NR by AY at 3/6/2021 1322                   Point: Home exercise program (Done)     Learning Progress Summary           Patient Acceptance, E, VU,NR by KG at 3/8/2021 0821    Acceptance, E,TB, VU,NR by AY at 3/6/2021 1322    Acceptance, E, NR by JB1 at 3/5/2021 1010    Acceptance, E, NR by JB1 at 3/4/2021 1030   Family Acceptance, E,TB, VU,NR by AY at 3/6/2021 1322                   Point: Body mechanics (Done)     Learning Progress Summary           Patient Acceptance, E, VU,NR by KG at 3/8/2021 0821    Acceptance, E,TB, VU,NR by AY at 3/7/2021 1126    Acceptance, E,TB, VU,NR by AY at 3/6/2021 1322    Acceptance, E, VU by JONES at 3/6/2021 0429    Acceptance, E, NR by JB1 at 3/5/2021 1010    Acceptance, E, NR by JB1 at 3/4/2021 1030   Family Acceptance, E,TB, VU,NR by AY at 3/6/2021 1322                   Point: Precautions (Done)     Learning Progress Summary           Patient Acceptance, E, VU,NR by KG at 3/8/2021 0821    Acceptance, E,TB, VU,NR by AY at 3/7/2021 1126    Acceptance, E,TB, VU,NR by AY at 3/6/2021 1322    Acceptance, E, NR by JB1 at 3/5/2021 1010    Acceptance, E, NR by JB1 at 3/4/2021 1030   Family Acceptance, E,TB, VU,NR by AY at 3/6/2021 1322                               User Key     Initials Effective Dates Name Provider Type Discipline    [facility] 06/19/15 -  Cecilia Hughes, PT Physical Therapist PT    JONES 06/16/16 -  Clint Avilez RN Registered Nurse Nurse    KG 05/22/20 -  Elise Washington PT Physical Therapist PT    AY 11/10/20 -  Shelby Rojas, PT Physical Therapist PT              PT Recommendation and Plan     Plan of Care Reviewed With: patient  Progress: improving  Outcome Summary: Pt increased ambulation distance to 660ft with CGA and B UE support. VC's for upright posture and increased stride length. Pt demonstrated good stability with no LOB. Required one standing rest break. Denied any c/o pain. Continue to progress as  appropriate.     Time Calculation:   PT Charges     Row Name 03/08/21 0821             Time Calculation    Start Time  0821  -KG      PT Received On  03/08/21  -KG      PT Goal Re-Cert Due Date  03/14/21  -KG         Time Calculation- PT    Total Timed Code Minutes- PT  23 minute(s)  -KG         Timed Charges    28261 - Gait Training Minutes   23  -KG        User Key  (r) = Recorded By, (t) = Taken By, (c) = Cosigned By    Initials Name Provider Type    KG Elise Washington, PT Physical Therapist        Therapy Charges for Today     Code Description Service Date Service Provider Modifiers Qty    16978193874 HC GAIT TRAINING EA 15 MIN 3/8/2021 Elise Washington, PT GP 2    20896213939 HC PT THER SUPP EA 15 MIN 3/8/2021 Elise Washington, PT GP 2          PT G-Codes  Outcome Measure Options: AM-PAC 6 Clicks Basic Mobility (PT)  AM-PAC 6 Clicks Score (PT): 18    Graciela Washington PT  3/8/2021

## 2021-03-08 NOTE — PLAN OF CARE
Goal Outcome Evaluation:  Plan of Care Reviewed With: patient  Progress: improving  Outcome Summary: Pt came to us from the unit today.  VSS mild c/o pain tylenol given and effective.  Ambulated in room to bathroom tolerated well.  Appitite is good.  Incision is good open to air midsternal.  Plan is to DC home tomorrow if stable.

## 2021-03-08 NOTE — PROGRESS NOTES
Continued Stay Note  [facility]     Patient Name: Yeison Bryant  MRN: [id number]  Today's Date: 3/8/2021    Admit Date: 3/3/2021    Discharge Plan     Row Name 03/08/21 1126       Plan    Plan  Home with family    Patient/Family in Agreement with Plan  yes    Plan Comments  Patient up in the chair at CM visit.  Ambulates 660 ft with therapy.   On room air.  Has transfer orders.  Plan is home.  Denies discharge needs.    Final Discharge Disposition Code  01 - home or self-care        Discharge Codes    No documentation.             Lois Potts RN

## 2021-03-08 NOTE — PLAN OF CARE
Goal Outcome Evaluation:  Patient AOX4/4, VSS on room air, no drips. Patient ambulating well with minimal assistance. Adequate UO; pleural tube scant drainage over night. Patient appears ot be doing well. Appropriate for transfer.

## 2021-03-08 NOTE — PROGRESS NOTES
CTS Progress Note       LOS: 5 days   Patient Care Team:  Kreis, Samuel Duane, MD as PCP - General (Family Medicine)  Mabel Flores MD as Consulting Physician (Cardiology)    Chief Complaint: Thoracic aortic aneurysm without rupture (CMS/HCC)    Vital Signs:  Temp:  [99 °F (37.2 °C)] 99 °F (37.2 °C)  Heart Rate:  [] 89  Resp:  [16-18] 18  BP: ()/() 98/81    Physical Exam:  Up in chair breathing unlabored     Results:   Results from last 7 days   Lab Units 03/07/21  0347   WBC 10*3/mm3 10.96*   HEMOGLOBIN g/dL 10.5*   HEMATOCRIT % 32.4*   PLATELETS 10*3/mm3 136*     Results from last 7 days   Lab Units 03/08/21  0408   SODIUM mmol/L 136   POTASSIUM mmol/L 4.1   CHLORIDE mmol/L 100   CO2 mmol/L 29.0   BUN mg/dL 25*   CREATININE mg/dL 0.78   GLUCOSE mg/dL 120*   CALCIUM mg/dL 9.2           Imaging Results (Last 24 Hours)     Procedure Component Value Units Date/Time    XR Chest 1 View [[id number]] Resulted: 03/08/21 0429     Updated: 03/08/21 0437    XR Chest 1 View [[id number]] Collected: 03/07/21 0918     Updated: 03/07/21 1247    Narrative:         EXAMINATION: XR CHEST 1 VW - 03/07/2021     INDICATION: J95.811-Postprocedural pneumothorax; I71.2-Thoracic aortic  aneurysm, without rupture; Q23.1-Congenital insufficiency of aortic  valve. Evaluate tube placement.      COMPARISON: 03/06/2021     FINDINGS: Portable chest reveals heart to be enlarged. There is a chest  tube identified on the right. No definite pneumothorax. Chest tube on  the left. Mild increased markings in lung bases bilaterally. Small left  pleural effusion. Patient is status post median sternotomy.          Impression:      The chest with no definite pneumothorax identified on the  right. Bilateral chest tubes in place. Heart is enlarged with minimal  increased markings at the lung bases bilaterally.     DICTATED:   03/07/2021  EDITED/ls :   03/07/2021                Assessment      Acending thoracic aortic aneurysm (Repair/replace  w/ woven Dacron graft size 30 mm 3/3/21)    New onset atrial fibrillation (S/p Maze w/ Pulmonary Vein Ablation 3/3/21))    Former smoker    COVID-19 virus (12/2020)    Bicuspid aortic valve (S/P 27 trifecta bioprosthetic tissue valve 3/3/21)    Cardiomyopathy (EF <25%)    Discontinue right pleural tube.  Discontinue right internal jugular line    Plan   Discontinue right pleural tube.  Discontinue right internal jugular central line.  Transfer to telemetry.  Chest x-ray in the a.m.  Discontinue all narcotics at transfer.  Patient request Tylenol only for pain.  Anticipate start Eliquis in the morning possibly discharge home tomorrow.    Please note that portions of this note were completed with a voice recognition program. Efforts were made to edit the dictations, but occasionally words are mistranscribed.    Evgeny Guerrier MD  03/08/21  06:28 EST

## 2021-03-08 NOTE — PLAN OF CARE
Goal Outcome Evaluation:  Plan of Care Reviewed With: patient  Progress: improving  Outcome Summary: Pt increased ambulation distance to 660ft with CGA and B UE support. VC's for upright posture and increased stride length. Pt demonstrated good stability with no LOB. Required one standing rest break. Denied any c/o pain. Continue to progress as appropriate.

## 2021-03-08 NOTE — PROGRESS NOTES
"  [facility]  PROGRESS NOTE    Date of Admission: 3/3/2021  Date of Service: 03/08/21    Primary Care Physician: Kreis, Samuel Duane, MD    Chief Complaint: f/u aortic stenosis, NICM, PAF   Problem List:   1. Ascending aortic aneurysm  a. 5.6cm on CT chest   b. Replacement and repair of AAA with 30mm Dacron graft 3/3/2021, Dr. Guerrier  2. Low flow- low gradient Aortic stenosis  a. Right and LHC 1/25/21: severe global hypokinesia and EF 20%, mod-severe PAH with elevated LVEDP; cardiac output low at 4.9L/min, normal coronaries, dilated aortic root   b. DIONISIO 1/26/21: EF 21-25%, bicuspid aortic valve with mild AS, moderate functional MR, severe dilation of ascending aorta measuring 5.7cm, RV is mildly dilated   c. Dobutamine stress echo 1/27/21: Best diagnosis is low flow, low gradient aortic stenosis.  d. AV Replacement 27 trifecta bioprosthetic tissue valve, 3/3/2021 Dr. Guerrier  3. Atrial fibrillation   a. Diagnosed 1/18/21 at time of hospitalization in [place]  b. CHADsVASC=2  c. Echo 1/19/21: EF 56-60%, LA is mildly increased, RVSP 42mmHg, moderate dilation of the aortic root   d. MAZE procedure with ablation of left/right superior and inferior pulmonary veins.  Unable to clip CHARLOTTE as DIONISIO demonstrated a clot within the appendage.  4. Covid 19 infection 12/27/20  5. History of tobacco abuse, inactive 2008  6. Probable ALESSANDRO   7. Obesity    Subjective      Asymptomatic at rest. Just moved to telemetry. States he feels better than yesterday overall. Denies any palpitations       Objective   Vitals: BP (!) 136/113   Pulse 104   Temp 98.9 °F (37.2 °C) (Oral)   Resp 20   Ht 188 cm (74\")   Wt 126 kg (277 lb 12.8 oz)   SpO2 96%   BMI 35.67 kg/m²     Physical Exam:  GENERAL: Alert, cooperative, in no acute distress.   HEENT: Normocephalic, no jugular venous distention  HEART: Irregular rhythm, normal rate, and no murmurs, gallops, or rubs.   LUNGS:  No wheezing,or rhonchi. Scattered " rales   ABDOMEN: Soft, bowel sounds present, non-tender   NEUROLOGIC: No focal abnormalities involving strength or sensation are noted.   EXTREMITIES: No clubbing, cyanosis, or edema noted.     Results:  Results from last 7 days   Lab Units 03/07/21  0347 03/06/21  0355 03/05/21  0408   WBC 10*3/mm3 10.96* 14.45* 16.68*   HEMOGLOBIN g/dL 10.5* 10.8* 10.4*   HEMATOCRIT % 32.4* 33.0* 32.6*   PLATELETS 10*3/mm3 136* 135* 151     Results from last 7 days   Lab Units 03/08/21  0408 03/07/21  0347 03/06/21  0355   SODIUM mmol/L 136 135* 132*   POTASSIUM mmol/L 4.1 4.3 4.6   CHLORIDE mmol/L 100 97* 96*   CO2 mmol/L 29.0 29.0 29.0   BUN mg/dL 25* 32* 34*   CREATININE mg/dL 0.78 0.86 1.23   GLUCOSE mg/dL 120* 119* 141*      Lab Results   Component Value Date    CHOL 181 01/19/2021    TRIG 124 01/19/2021    HDL 56 01/19/2021     (H) 01/19/2021    AST 23 03/01/2021    ALT 24 03/01/2021     Results from last 7 days   Lab Units 03/01/21  1608   HEMOGLOBIN A1C % 5.90*       Results from last 7 days   Lab Units 03/04/21  0853 03/03/21  1224 03/01/21  1608   PROTIME Seconds 15.1* 13.0 12.7   INR  1.22* 1.01 0.98   APTT seconds  --  36.1 32.5       Intake/Output Summary (Last 24 hours) at 3/8/2021 0910  Last data filed at 3/8/2021 0400  Gross per 24 hour   Intake 400 ml   Output 2075 ml   Net -1675 ml     I personally reviewed the patient's EKG/Telemetry data    Radiology Data:   CXR 3/8/21:  IMPRESSION:  Removal of the left chest tube with no definite  pneumothorax. The heart remains enlarged with right chest tube in place  and tiny apical pneumothorax seen on the right.         Current Medications:  aspirin, 325 mg, Oral, Daily  atorvastatin, 80 mg, Oral, Nightly  metoprolol tartrate, 12.5 mg, Oral, Q12H  pantoprazole, 40 mg, Oral, Q AM  sennosides-docusate, 2 tablet, Oral, BID  tamsulosin, 0.4 mg, Oral, Daily           Assessment and Plan:   1. Low Flow, Low Gradient Severe Aortic Stenosis  -  EF by DIONISIO 1/26/21 21-25%.   Bicuspid aortic valve.  Normal coronaries by [facility]  -  POD5 Aortic Valve Replacement using #27 trifecta bioprosthetic tissue valve, with repair of ascending aneurysm using 30mm Dacron graft, Dr. Guerrier.  Per CTS.  - will repeat echo for LV function and to look at valve in about 3 months at his office visit   - will need AHA SBE ppx instructions prior to discharge   - will change to 6.25 mg po BID Coreg (from metoprolol tartrate)  - BP too low now for ACEI, will relook prior to discharge.     2. PAF  - CHADsVASC=2, on Eliquis prior to surgery  - s/o MAZE procedure; could not deliver CHARLOTTE clip as thrombus seen in CHARLOTTE by DIONISIO intra-op   - needs anticoagulation to start today.  - recurrent A flutter with CVR 3/6.     3. Worsening right pneumothorax: Pleural tube placed 3/7 and D/C'd 3/8    - per CTS    Electronically signed by Eleanor Novak PA-C, 03/08/21, 9:12 AM EST.

## 2021-03-09 ENCOUNTER — READMISSION MANAGEMENT (OUTPATIENT)
Dept: CALL CENTER | Facility: HOSPITAL | Age: 66
End: 2021-03-09

## 2021-03-09 ENCOUNTER — APPOINTMENT (OUTPATIENT)
Dept: GENERAL RADIOLOGY | Facility: HOSPITAL | Age: 66
End: 2021-03-09

## 2021-03-09 VITALS
HEART RATE: 82 BPM | TEMPERATURE: 97.7 F | HEIGHT: 74 IN | OXYGEN SATURATION: 94 % | BODY MASS INDEX: 34.68 KG/M2 | WEIGHT: 270.2 LBS | DIASTOLIC BLOOD PRESSURE: 75 MMHG | SYSTOLIC BLOOD PRESSURE: 149 MMHG | RESPIRATION RATE: 18 BRPM

## 2021-03-09 LAB
ANION GAP SERPL CALCULATED.3IONS-SCNC: 8 MMOL/L (ref 5–15)
BUN SERPL-MCNC: 22 MG/DL (ref 8–23)
BUN/CREAT SERPL: 32.4 (ref 7–25)
CALCIUM SPEC-SCNC: 9.1 MG/DL (ref 8.6–10.5)
CHLORIDE SERPL-SCNC: 102 MMOL/L (ref 98–107)
CO2 SERPL-SCNC: 29 MMOL/L (ref 22–29)
CREAT SERPL-MCNC: 0.68 MG/DL (ref 0.76–1.27)
GFR SERPL CREATININE-BSD FRML MDRD: 117 ML/MIN/1.73
GLUCOSE SERPL-MCNC: 129 MG/DL (ref 65–99)
HCT VFR BLD AUTO: 32.7 % (ref 37.5–51)
HGB BLD-MCNC: 10.7 G/DL (ref 13–17.7)
POTASSIUM SERPL-SCNC: 3.9 MMOL/L (ref 3.5–5.2)
SODIUM SERPL-SCNC: 139 MMOL/L (ref 136–145)

## 2021-03-09 PROCEDURE — 71045 X-RAY EXAM CHEST 1 VIEW: CPT

## 2021-03-09 PROCEDURE — 85018 HEMOGLOBIN: CPT | Performed by: PHYSICIAN ASSISTANT

## 2021-03-09 PROCEDURE — 99024 POSTOP FOLLOW-UP VISIT: CPT | Performed by: THORACIC SURGERY (CARDIOTHORACIC VASCULAR SURGERY)

## 2021-03-09 PROCEDURE — 99231 SBSQ HOSP IP/OBS SF/LOW 25: CPT | Performed by: PHYSICIAN ASSISTANT

## 2021-03-09 PROCEDURE — 85014 HEMATOCRIT: CPT | Performed by: PHYSICIAN ASSISTANT

## 2021-03-09 PROCEDURE — 80048 BASIC METABOLIC PNL TOTAL CA: CPT | Performed by: PHYSICIAN ASSISTANT

## 2021-03-09 RX ORDER — ATORVASTATIN CALCIUM 80 MG/1
80 TABLET, FILM COATED ORAL NIGHTLY
Start: 2021-03-09 | End: 2021-04-15

## 2021-03-09 RX ORDER — LISINOPRIL 10 MG/1
10 TABLET ORAL DAILY
Qty: 30 TABLET | Refills: 11 | Status: SHIPPED | OUTPATIENT
Start: 2021-03-09 | End: 2021-03-10 | Stop reason: SDUPTHER

## 2021-03-09 RX ORDER — ASPIRIN 81 MG/1
81 TABLET ORAL DAILY
Qty: 100 TABLET | Refills: 4
Start: 2021-03-10 | End: 2021-05-19 | Stop reason: SDUPTHER

## 2021-03-09 RX ORDER — CARVEDILOL 25 MG/1
25 TABLET ORAL 2 TIMES DAILY WITH MEALS
Qty: 60 TABLET | Refills: 5 | Status: SHIPPED | OUTPATIENT
Start: 2021-03-09 | End: 2021-03-10 | Stop reason: SDUPTHER

## 2021-03-09 RX ORDER — TAMSULOSIN HYDROCHLORIDE 0.4 MG/1
0.4 CAPSULE ORAL DAILY
Qty: 30 CAPSULE | Refills: 0 | Status: SHIPPED | OUTPATIENT
Start: 2021-03-09 | End: 2021-04-15

## 2021-03-09 RX ORDER — CARVEDILOL 12.5 MG/1
12.5 TABLET ORAL 2 TIMES DAILY WITH MEALS
Start: 2021-03-09 | End: 2021-03-09 | Stop reason: HOSPADM

## 2021-03-09 RX ADMIN — CARVEDILOL 12.5 MG: 12.5 TABLET, FILM COATED ORAL at 08:27

## 2021-03-09 RX ADMIN — DOCUSATE SODIUM 50MG AND SENNOSIDES 8.6MG 2 TABLET: 8.6; 5 TABLET, FILM COATED ORAL at 08:26

## 2021-03-09 RX ADMIN — TAMSULOSIN HYDROCHLORIDE 0.4 MG: 0.4 CAPSULE ORAL at 08:28

## 2021-03-09 RX ADMIN — ASPIRIN 81 MG: 81 TABLET, COATED ORAL at 08:27

## 2021-03-09 RX ADMIN — SODIUM CHLORIDE, PRESERVATIVE FREE 10 ML: 5 INJECTION INTRAVENOUS at 06:32

## 2021-03-09 RX ADMIN — APIXABAN 5 MG: 5 TABLET, FILM COATED ORAL at 08:27

## 2021-03-09 RX ADMIN — PANTOPRAZOLE SODIUM 40 MG: 40 TABLET, DELAYED RELEASE ORAL at 06:31

## 2021-03-09 NOTE — PROGRESS NOTES
CTS Progress Note       LOS: 6 days   Patient Care Team:  Kreis, Samuel Duane, MD as PCP - General (Family Medicine)  Mabel Flores MD as Consulting Physician (Cardiology)    Chief Complaint: Thoracic aortic aneurysm without rupture (CMS/HCC)    Vital Signs:  Temp:  [97.9 °F (36.6 °C)-99.1 °F (37.3 °C)] 97.9 °F (36.6 °C)  Heart Rate:  [] 85  Resp:  [18-20] 18  BP: (105-147)/() 110/71    Physical Exam:       Results:   Results from last 7 days   Lab Units 03/07/21  0347   WBC 10*3/mm3 10.96*   HEMOGLOBIN g/dL 10.5*   HEMATOCRIT % 32.4*   PLATELETS 10*3/mm3 136*     Results from last 7 days   Lab Units 03/08/21  0408   SODIUM mmol/L 136   POTASSIUM mmol/L 4.1   CHLORIDE mmol/L 100   CO2 mmol/L 29.0   BUN mg/dL 25*   CREATININE mg/dL 0.78   GLUCOSE mg/dL 120*   CALCIUM mg/dL 9.2           Imaging Results (Last 24 Hours)     Procedure Component Value Units Date/Time    XR Chest 1 View [[id number]] Resulted: 03/09/21 0545     Updated: 03/09/21 0604          Assessment      Acending thoracic aortic aneurysm (Repair/replace w/ woven Dacron graft size 30 mm 3/3/21)    New onset atrial fibrillation (S/p Maze w/ Pulmonary Vein Ablation 3/3/21))    Former smoker    COVID-19 virus (12/2020)    Bicuspid aortic valve (S/P 27 trifecta bioprosthetic tissue valve 3/3/21)    Cardiomyopathy (EF <25%)    May discharge if satisfactory with cardiology.  Eliquis started yesterday.  Spoke with patient's and he will require no narcotics at discharge.  Plan   Discharge home today if satisfactory with cardiology.  Eliquis started.  Patient requires no narcotics at discharge    Please note that portions of this note were completed with a voice recognition program. Efforts were made to edit the dictations, but occasionally words are mistranscribed.    Evgeny Guerrier MD  03/09/21  06:20 EST

## 2021-03-09 NOTE — PROGRESS NOTES
[facility]  CLINICAL REVIEW NOTE    Patient Name: Yeison Bryant  : 1955  MRN: [id number]    Date of Admission: 3/3/2021  Length of Stay: 6  Attending Service:  CTS        Patient's labs, charting, and events reviewed.  No active medical management issues to address today, have adjusted discharge med rec to reflect most recent cardiology medication changes. The Hospitalist team will be available for any needs, and see or bill for services as needed.

## 2021-03-09 NOTE — PLAN OF CARE
Problem: Adult Inpatient Plan of Care  Goal: Plan of Care Review  Outcome: Ongoing, Progressing  Flowsheets  Taken 3/9/2021 0410 by Tracie Hung, RN  Outcome Summary: Pt came from unit. Denies Pain. Ambulated in room and ambulated in hallways during the day. Slept between care. Possible discharge today. No signs of acute distress.  Taken 3/8/2021 1800 by Chiquis Borges, RN  Progress: improving  Plan of Care Reviewed With: patient   Goal Outcome Evaluation:        Outcome Summary: Pt came from unit. Denies Pain. Ambulated in room and ambulated in hallways during the day. Slept between care. Possible discharge today. No signs of acute distress.

## 2021-03-09 NOTE — PROGRESS NOTES
"  [facility]  PROGRESS NOTE    Date of Admission: 3/3/2021  Date of Service: 03/09/21    Primary Care Physician: Kreis, Samuel Duane, MD    Chief Complaint: f/u NICM, VHD, PAF  Problem List:   1. Ascending aortic aneurysm  a. 5.6cm on CT chest   b. Replacement and repair of AAA with 30mm Dacron graft 3/3/2021, Dr. Guerrier  2. Low flow- low gradient Aortic stenosis  a. Right and LHC 1/25/21: severe global hypokinesia and EF 20%, mod-severe PAH with elevated LVEDP; cardiac output low at 4.9L/min, normal coronaries, dilated aortic root   b. DIONISIO 1/26/21: EF 21-25%, bicuspid aortic valve with mild AS, moderate functional MR, severe dilation of ascending aorta measuring 5.7cm, RV is mildly dilated   c. Dobutamine stress echo 1/27/21: Best diagnosis is low flow, low gradient aortic stenosis.  d. AV Replacement 27 trifecta bioprosthetic tissue valve, 3/3/2021 Dr. Guerrier  3. Atrial fibrillation   a. Diagnosed 1/18/21 at time of hospitalization in [place]  b. CHADsVASC=2  c. Echo 1/19/21: EF 56-60%, LA is mildly increased, RVSP 42mmHg, moderate dilation of the aortic root   d. MAZE procedure with ablation of left/right superior and inferior pulmonary veins.  Unable to clip CHARLOTTE as DIONISIO demonstrated a clot within the appendage.  4. Covid 19 infection 12/27/20  5. History of tobacco abuse, inactive 2008  6. Probable ALESSANDRO   7. Obesity    Subjective      Asymptomatic at rest, denies palpitations or dyspnea. Asking about possible discharge     Objective   Vitals: /75 (BP Location: Right arm, Patient Position: Lying)   Pulse 82   Temp 97.7 °F (36.5 °C) (Oral)   Resp 18   Ht 188 cm (74\")   Wt 123 kg (270 lb 3.2 oz)   SpO2 95%   BMI 34.69 kg/m²     Physical Exam:  GENERAL: Alert, cooperative, in no acute distress.   HEENT: Normocephalic, no jugular venous distention  HEART: Irregular rhythm, normal rate, and no murmurs, gallops, or rubs.   LUNGS:  No wheezing, rales or rhonchi. 96% on " room air   ABDOMEN: Soft, bowel sounds present, non-tender   NEUROLOGIC: No focal abnormalities involving strength or sensation are noted.   EXTREMITIES: No clubbing, cyanosis, or edema noted.     Results:  Results from last 7 days   Lab Units 03/09/21  0651 03/07/21  0347 03/06/21  0355 03/05/21  0408   WBC 10*3/mm3  --  10.96* 14.45* 16.68*   HEMOGLOBIN g/dL 10.7* 10.5* 10.8* 10.4*   HEMATOCRIT % 32.7* 32.4* 33.0* 32.6*   PLATELETS 10*3/mm3  --  136* 135* 151     Results from last 7 days   Lab Units 03/09/21  0651 03/08/21  0408 03/07/21  0347   SODIUM mmol/L 139 136 135*   POTASSIUM mmol/L 3.9 4.1 4.3   CHLORIDE mmol/L 102 100 97*   CO2 mmol/L 29.0 29.0 29.0   BUN mg/dL 22 25* 32*   CREATININE mg/dL 0.68* 0.78 0.86   GLUCOSE mg/dL 129* 120* 119*      Lab Results   Component Value Date    CHOL 181 01/19/2021    TRIG 124 01/19/2021    HDL 56 01/19/2021     (H) 01/19/2021    AST 23 03/01/2021    ALT 24 03/01/2021       Results from last 7 days   Lab Units 03/04/21  0853 03/03/21  1224   PROTIME Seconds 15.1* 13.0   INR  1.22* 1.01   APTT seconds  --  36.1       Intake/Output Summary (Last 24 hours) at 3/9/2021 0854  Last data filed at 3/9/2021 0718  Gross per 24 hour   Intake 980 ml   Output 1300 ml   Net -320 ml     I personally reviewed the patient's EKG/Telemetry data    Radiology Data:   CXR 3/9/21:  IMPRESSION:  Interval removal of right internal jugular approach catheter  sheath and right chest tube from prior comparison with a persistent  trace right apical predominant pneumothorax.     Current Medications:  apixaban, 5 mg, Oral, Q12H  aspirin, 81 mg, Oral, Daily  atorvastatin, 80 mg, Oral, Nightly  carvedilol, 12.5 mg, Oral, BID With Meals  pantoprazole, 40 mg, Oral, Q AM  sennosides-docusate, 2 tablet, Oral, BID  sodium chloride, 10 mL, Intravenous, Q12H  sodium chloride, 10 mL, Intravenous, Q8H  tamsulosin, 0.4 mg, Oral, Daily           Assessment and Plan:     1. Low Flow, Low Gradient Severe  Aortic Stenosis  -  EF by DIONISIO 1/26/21 21-25%.  Bicuspid aortic valve.  Normal coronaries by [facility]  -  POD6 Aortic Valve Replacement using #27 trifecta bioprosthetic tissue valve, with repair of ascending aneurysm using 30mm Dacron graft, Dr. Guerrier.  Per CTS.  - will repeat echo for LV and valvular function in 3 months at his office visit   - providedd AHA SBE ppx instructions and wallet card and discussed indications   - Will add low dose ACE-I at discharge for cardiomyopathy and elevated BP      2. PAF  - CHADsVASC=2, on Eliquis prior to surgery  - s/o MAZE procedure; could not deliver CHARLOTTE clip as thrombus seen in CHARLOTTE by DIONISIO intra-op  - recurrent A flutter with CVR 3/6.  - Eliquis resumed yesterday, resume home dose of Coreg at discharge       3. Right pneumothorax: Pleural tube placed 3/7 and D/C'd 3/8               - per CTS       Electronically signed by Eleanor Novak PA-C, 03/09/21, 8:55 AM EST.

## 2021-03-10 ENCOUNTER — READMISSION MANAGEMENT (OUTPATIENT)
Dept: CALL CENTER | Facility: HOSPITAL | Age: 66
End: 2021-03-10

## 2021-03-10 RX ORDER — LISINOPRIL 10 MG/1
10 TABLET ORAL DAILY
Qty: 30 TABLET | Refills: 11 | Status: SHIPPED | OUTPATIENT
Start: 2021-03-10 | End: 2021-04-16 | Stop reason: SINTOL

## 2021-03-10 RX ORDER — CARVEDILOL 25 MG/1
25 TABLET ORAL 2 TIMES DAILY WITH MEALS
Qty: 60 TABLET | Refills: 5 | Status: SHIPPED | OUTPATIENT
Start: 2021-03-10 | End: 2021-05-19 | Stop reason: SDUPTHER

## 2021-03-10 NOTE — OUTREACH NOTE
CT Surgery Week 1 Survey      Responses   [facility] patient discharged from?  [place]   Does the patient have one of the following disease processes/diagnoses(primary or secondary)?  Cardiothoracic surgery   Week 1 attempt successful?  Yes   Call start time  1527   Call end time  1535   Is patient permission given to speak with other caregiver?  Yes   List who call center can speak with  Marcial-son or spouse-Nicky   Person spoke with today (if not patient) and relationship  Marcial-son   Meds reviewed with patient/caregiver?  Yes   Is the patient having any side effects they believe may be caused by any medication additions or changes?  No   Does the patient have all medications related to this admission filled (includes all antibiotics, pain medications, cardiac medications, etc.)  Yes   Is the patient taking all medications as directed (includes completed medication regime)?  Yes   Comments regarding appointments  PCP appt 03/18/21.   Does the patient have a primary care provider?   Yes   Does the patient have an appointment scheduled with their C/T surgeon?  Yes   Has the patient kept scheduled appointments due by today?  N/A   Has home health visited the patient within 72 hours of discharge?  N/A   Psychosocial issues?  No   Did the patient receive a copy of their discharge instructions?  Yes   Nursing interventions  Reviewed instructions with patient   What is the patient's perception of their health status since discharge?  Improving   Nursing interventions  Nurse provided patient education   Nursing interventions  Reassured on normal signs of recovery   Is the patient /caregiver able to teach back basic post-op care?  Continue use of incentive spirometry at least 1 week post discharge, Drive as instructed by MD in discharge instructions, No tub bath, swimming, or hot tub until instructed by MD, Practice 'cough and deep breath', Take showers only when approved by MD-sponge bathe until then, Keep incision  "areas clean, dry and protected, Lifting as instructed by MD in discharge instructions   Is the patient/caregiver able to teach back signs and symptoms of incisional infection?  Increased redness, swelling or pain at the incisonal site, Incisional warmth, Fever, Increased drainage or bleeding, Pus or odor from incision   Is the patient/caregiver able to teach back steps to recovery at home?  Set small, achievable goals for return to baseline health, Eat a well-balance diet, Rest and rebuild strength, gradually increase activity, Weigh daily, Practice good oral hygiene, Make a list of questions for surgeon's appointment   Is the patient /caregiver able to teach back the importance of cardiac rehab?  Yes   Nursing interventions  Provided education on importance of cardiac rehab   If the patient is a current smoker, are they able to teach back resources for cessation?  Not a smoker   Is the patient/caregiver able to teach back the hierarchy of who to call/visit for symptoms/problems? PCP, Specialist, Home health nurse, Urgent Care, ED, 911  Yes   Week 1 call completed?  Yes   Wrap up additional comments  Son states patient is doing well. Denies any s/s of infection or cardiac s/s. States patient is weighing daily and keeping record of BP-states weight and BP are \"good\". Denies any needs today.            Ailyn Chavarria RN   "

## 2021-03-10 NOTE — OUTREACH NOTE
Prep Survey      Responses   [facility] facility patient discharged from?  [place]   Is LACE score < 7 ?  No   Emergency Room discharge w/ pulse ox?  No   Eligibility  Readm Mgmt   Discharge diagnosis  Aortic valve replacement,   hx of Covid 19   Does the patient have one of the following disease processes/diagnoses(primary or secondary)?  Cardiothoracic surgery   Does the patient have Home health ordered?  No   Is there a DME ordered?  No   Prep survey completed?  Yes          Altagracia Nova RN

## 2021-03-15 ENCOUNTER — OFFICE VISIT (OUTPATIENT)
Dept: CARDIOLOGY | Facility: HOSPITAL | Age: 66
End: 2021-03-15

## 2021-03-15 VITALS
BODY MASS INDEX: 34.93 KG/M2 | SYSTOLIC BLOOD PRESSURE: 134 MMHG | TEMPERATURE: 97.9 F | OXYGEN SATURATION: 99 % | WEIGHT: 272.13 LBS | DIASTOLIC BLOOD PRESSURE: 81 MMHG | HEART RATE: 82 BPM | RESPIRATION RATE: 18 BRPM | HEIGHT: 74 IN

## 2021-03-15 DIAGNOSIS — Q23.1 BICUSPID AORTIC VALVE: ICD-10-CM

## 2021-03-15 DIAGNOSIS — I71.20 THORACIC AORTIC ANEURYSM WITHOUT RUPTURE (HCC): ICD-10-CM

## 2021-03-15 DIAGNOSIS — I48.19 ATRIAL FIBRILLATION, PERSISTENT (HCC): ICD-10-CM

## 2021-03-15 DIAGNOSIS — I50.22 CHRONIC SYSTOLIC HEART FAILURE (HCC): Primary | ICD-10-CM

## 2021-03-15 PROCEDURE — 99214 OFFICE O/P EST MOD 30 MIN: CPT | Performed by: NURSE PRACTITIONER

## 2021-03-15 RX ORDER — GUAIFENESIN 600 MG/1
600 TABLET, EXTENDED RELEASE ORAL DAILY
COMMUNITY
End: 2021-05-05

## 2021-03-15 NOTE — PROGRESS NOTES
"Chief Complaint  Congestive Heart Failure and Follow-up    Subjective    History of Present Illness {  Problem List  Visit  Diagnosis   Encounters  Notes  Medications  Labs  Result Review Imaging  Media :23}       History of Present Illness   65 year old male presents to the office today for ongoing evaluation of his chronic systolic heart failure and valvular heart disease. Patient underwent repair of ascending aortic aneurysm on 3/3/2021 per Dr. Guerrier, AV replacement and maze of left/right superior and inferior pulmonary veins.  Unable to clip CHARLOTTE as clot was noted per DIONISIO.  Patient has a history of COVID-19 December 27, 2020, probable obstructive sleep apnea and atrial fibrillation.  He is anticoagulated with Eliquis and denies any signs and symptoms of bleeding.  Reports that energy is slowly returning.  He reports he is ambulating multiple times throughout the day.  Notes mild dyspnea on exertion when he overdoes it.  Currently denies fevers, chills, drainage from the incision site.  Objective     Vital Signs:   Vitals:    03/15/21 1005 03/15/21 1013   BP: 120/69 134/81   BP Location: Left arm Right arm   Patient Position: Sitting Sitting   Cuff Size: Adult Adult   Pulse: 52 82   Resp: 18    Temp: 97.9 °F (36.6 °C)    TempSrc: Temporal    SpO2: 99%    Weight: 123 kg (272 lb 2 oz)    Height: 188 cm (74\")      Body mass index is 34.94 kg/m².  Physical Exam  Vitals and nursing note reviewed.   Constitutional:       Appearance: Normal appearance.   HENT:      Head: Normocephalic.   Eyes:      Pupils: Pupils are equal, round, and reactive to light.   Cardiovascular:      Rate and Rhythm: Normal rate. Rhythm irregular.      Pulses: Normal pulses.      Heart sounds: Normal heart sounds. No murmur.   Pulmonary:      Effort: Pulmonary effort is normal.      Breath sounds: Normal breath sounds.   Abdominal:      General: Bowel sounds are normal.      Palpations: Abdomen is soft.   Musculoskeletal:         " General: Normal range of motion.      Cervical back: Normal range of motion.      Right lower leg: No edema.      Left lower leg: No edema.   Skin:     General: Skin is warm and dry.      Capillary Refill: Capillary refill takes less than 2 seconds.      Comments: midsternal well healed  mt sites healing   Occlusive dressing to right upper chest    Neurological:      Mental Status: He is alert and oriented to person, place, and time.   Psychiatric:         Mood and Affect: Mood normal.         Thought Content: Thought content normal.              Result Review  Data Reviewed:{ Labs  Result Review  Imaging  Med Tab  Media :23}   Basic Metabolic Panel (03/09/2021 06:51)  Hemoglobin & Hematocrit, Blood (03/09/2021 06:51)  Magnesium (03/08/2021 04:08)  Adult Transesophageal Echo (DIONISIO) W/ Cont if Necessary Per Protocol (01/26/2021 13:09)  Adult Transthoracic Echo Limited W/ Cont if Necessary Per Protocol (01/26/2021 14:40)  Echocardiogram Stress Test For Aortic Valve Gradients (01/27/2021 13:47)             Assessment and Plan {CC Problem List  Visit Diagnosis  ROS  Review (Popup)  Health Maintenance  Quality  BestPractice  Medications  SmartSets  SnapShot Encounters  Media :23}   1. Chronic systolic heart failure (CMS/HCC)  Euvolemic ,continue carvedilol and lisinopril  NYHA class II   Heart failure education today including signs and symptoms, the role of the heart failure center, daily weights, low sodium diet (less than 1500 mg per day), and daily physical activity. Reviewed HF Zones with patient and family.  Patient to continue current medications as previously ordered.     2. Thoracic aortic aneurysm without rupture (CMS/HCC)  S/p repair per Dr Guerrier 3/3/2021  3. Atrial fibrillation, persistent (CMS/HCC)  Status post maze  Currently rate controlled  CHADS-VASc Risk Assessment            3 Total Score    1 CHF    1 Hypertension    1 Age 65-74        Criteria that do not apply:    Age >/= 75    DM     PRIOR STROKE/TIA/THROMBO    Vascular Disease    Sex: Female        Anticoagulated with eliquis and denies any s/s of bleeding   4. Bicuspid aortic valve (S/P 27 trifecta bioprosthetic tissue valve 3/3/21)  S/p repair       Follow Up {Instructions Charge Capture  Follow-up Communications :23}   Return in about 3 weeks (around 4/5/2021) for Telemedicine visit, HF.    Patient was given instructions and counseling regarding his condition or for health maintenance advice. Please see specific information pulled into the AVS if appropriate.  Patient was instructed to call the [facility] with any questions, concerns, or worsening symptoms.    *Please note that portions of this note were completed with a voice recognition program. Efforts were made to edit the dictations, but occasionally words are mistranscribed.

## 2021-03-16 ENCOUNTER — READMISSION MANAGEMENT (OUTPATIENT)
Dept: CALL CENTER | Facility: HOSPITAL | Age: 66
End: 2021-03-16

## 2021-03-16 NOTE — OUTREACH NOTE
CT Surgery Week 2 Survey      Responses   [facility] patient discharged from?  [place]   Does the patient have one of the following disease processes/diagnoses(primary or secondary)?  Cardiothoracic surgery   Week 2 attempt successful?  Yes   Call start time  1553   Call end time  1554   Discharge diagnosis  Aortic valve replacement,   hx of Covid 19   Meds reviewed with patient/caregiver?  Yes   Is the patient taking all medications as directed (includes completed medication regime)?  Yes   Has the patient kept scheduled appointments due by today?  Yes   What is the patient's perception of their health status since discharge?  Improving   Nursing interventions  Nurse provided patient education   Is the patient/caregiver able to teach back signs and symptoms of incisional infection?  Fever   Is the patient/caregiver able to teach back steps to recovery at home?  Rest and rebuild strength, gradually increase activity, Eat a well-balance diet   Week 2 call completed?  Yes          Luz Maria Schuster RN

## 2021-03-19 NOTE — DISCHARGE SUMMARY
CTS Discharge Summary    Patient Care Team:  Kreis, Samuel Duane, MD as PCP - General (Family Medicine)  Mabel Flores MD as Consulting Physician (Cardiology)  Consults:   Consults     Date and Time Order Name Status Description    3/3/2021 12:15 PM Inpatient Consult to Cardiology Completed           Date of Admission: 3/3/2021  5:04 AM  Date of Discharge: 3/9/2021     Discharge Diagnosis  Past Medical History:   Diagnosis Date   • Arrhythmia    • Arthritis    • Atrial fibrillation (CMS/HCC)    • Cataract    • CHF (congestive heart failure) (CMS/HCC) 01/18/2021   • COVID-19 12/2020   • COVID-19 vaccine administered 03/11/2021   • Dyslipidemia    • Former smoker    • History of gout 2011   • History of pneumonia 1974   • Hypertension    • Obesity (BMI 30-39.9)    • SOB (shortness of breath)      Thoracic aortic aneurysm without rupture (CMS/HCC) [I71.2]  Bicuspid aortic valve [Q23.1]  Thoracic aortic aneurysm without rupture (CMS/HCC) [I71.2]     Procedures Performed  Procedure(s):  MEDIAN STERNOTOMY, MAZE, AORTIC VALVE REPLACEMENT WITH LEFT FEMORAL ARTERIAL CANNULATION  ASCENDING THORACIC AORTIC ANEURYSM REPAIR, DIONISIO PER ANESTHESIA     History of Present Illness  Patient is a 65 y.o. male who was seen in the hospital at the request of Dr. Mahendra Waldron to evaluate an ascending aortic aneurysm and a bicuspid aortic valve. His coronary arteries are normal.  This patient did have Covid in mid December, 2020.  At this time, he has some shortness of breath and mild congestive failure symptoms with exertion. He is here to further discuss potential surgery.  He has a number of questions regarding the procedure.  Some of the information was already obtained during the patient's hospitalization and from his chart from other physicians.  Also, I discussed this case with Dr. Mahendra Waldron.         Hospital Course  Patient was taken to the operating room on 3/3/21 for aortic valve replacement with a size 27 trifecta  bioprosthetic tissue valve, replacement and repair of ascending aortic aneurysm with woven Dacron graft, MAZE procedure and left femoral arterial cutdown.  Patient was transported to cardiac ICU intubated and in stable condition. Extubated per ICU protocol.  POD 1:  Ventricular paced. Dixie removed.  POD 2:  Diuresed. Ambulated 200 ft with PT.  POD 3:  Afib. Right sided ptx.  Right pigtail catheter placed.    POD 4:  Remaining chest tubes and pacing wires removed. Right pigtail to waterseal.   POD 5:  Right pleural chest tube removed.  Patient transferred to telemetry.  POD 6: Eliquis started for afib.  Patient met discharge criteria and was discharged to home.        Discharge Medications     Discharge Medications      New Medications      Instructions Start Date   aspirin 81 MG EC tablet  Replaces: aspirin 81 MG chewable tablet   81 mg, Oral, Daily      lisinopril 10 MG tablet  Commonly known as: PRINIVIL,ZESTRIL   10 mg, Oral, Daily      tamsulosin 0.4 MG capsule 24 hr capsule  Commonly known as: FLOMAX   0.4 mg, Oral, Daily         Changes to Medications      Instructions Start Date   atorvastatin 80 MG tablet  Commonly known as: LIPITOR  What changed:   · medication strength  · how much to take   80 mg, Oral, Nightly         Continue These Medications      Instructions Start Date   apixaban 5 MG tablet tablet  Commonly known as: ELIQUIS   5 mg, Oral, Every 12 Hours Scheduled      carvedilol 25 MG tablet  Commonly known as: Coreg   25 mg, Oral, 2 Times Daily With Meals      multivitamin with minerals tablet tablet   1 tablet, Oral, Daily         Stop These Medications    aspirin 81 MG chewable tablet  Replaced by: aspirin 81 MG EC tablet            Discharge Diet:   Diet Instructions     Diet: Cardiac      Discharge Diet: Cardiac          Activity at Discharge:   Activity Instructions     Bathing Restrictions      Type of Restriction: Bathing    Bathing Restrictions: No Tub Bath    Driving Restrictions      Type  of Restriction: Driving    Driving Restrictions: No Driving Until Next Appointment    Lifting Restrictions      Type of Restriction: Lifting    Lifting Restrictions: Lifting Restriction (Indicate Limit)    Weight Limit (Pounds): 10    Length of Lifting Restriction: until next appointment          Follow-up Appointments  Future Appointments   Date Time Provider Department Center   4/1/2021 12:45 PM Mindi Leiva APRN [facility]   4/8/2021  1:45 PM Alicia Gonzalez APRN [facility] None   6/15/2021  2:00 PM [facility] ECHO/VASC CART RM1 [facility] NON [facility]   6/15/2021  3:30 PM Mahendra Waldron MD [facility] None        WOUND CARE: Monitor surgical wounds daily. Keep incisons clean and dry.   Call CT Surgery office [phone]  with any questions or concerns, specifically let them know of increased redness, drainage, or opening up of incision.       Lina Osuna PA-C  03/19/21  09:29 EDT

## 2021-03-22 ENCOUNTER — READMISSION MANAGEMENT (OUTPATIENT)
Dept: CALL CENTER | Facility: HOSPITAL | Age: 66
End: 2021-03-22

## 2021-03-22 NOTE — OUTREACH NOTE
CT Surgery Week 3 Survey      Responses   [facility] patient discharged from?  [place]   Does the patient have one of the following disease processes/diagnoses(primary or secondary)?  Cardiothoracic surgery   Week 3 attempt successful?  Yes   Call start time  1645   Rescheduled  Rescheduled-pt requested   Call end time  1646          Tamiko Yang RN

## 2021-03-24 ENCOUNTER — READMISSION MANAGEMENT (OUTPATIENT)
Dept: CALL CENTER | Facility: HOSPITAL | Age: 66
End: 2021-03-24

## 2021-03-24 NOTE — OUTREACH NOTE
CT Surgery Week 3 Survey      Responses   [facility] patient discharged from?  [place]   Does the patient have one of the following disease processes/diagnoses(primary or secondary)?  Cardiothoracic surgery   Week 3 attempt successful?  Yes   Call start time  1242   Call end time  1245   Discharge diagnosis  Aortic valve replacement,   hx of Covid 19   Meds reviewed with patient/caregiver?  Yes   Is the patient taking all medications as directed (includes completed medication regime)?  Yes   Medication comments  PCP gave colchine for gout   Has the patient kept scheduled appointments due by today?  Yes   Psychosocial issues?  No   Comments  Pt reports improving this week. He reports having gout right now, MD added meds.Chest Incision healed well. Groin incision still has size of goose egg edema.    What is the patient's perception of their health status since discharge?  New symptoms unrelated to diagnosis   Nursing interventions  Reassured on normal signs of recovery   Is the patient /caregiver able to teach back basic post-op care?  Continue use of incentive spirometry at least 1 week post discharge, No tub bath, swimming, or hot tub until instructed by MD, Do not remove steri-strips, Lifting as instructed by MD in discharge instructions   Is the patient/caregiver able to teach back signs and symptoms of incisional infection?  Incisional warmth, Increased redness, swelling or pain at the incisonal site   Is the patient/caregiver able to teach back steps to recovery at home?  Weigh daily, Set small, achievable goals for return to baseline health   If the patient is a current smoker, are they able to teach back resources for cessation?  Not a smoker   Is the patient/caregiver able to teach back the hierarchy of who to call/visit for symptoms/problems? PCP, Specialist, Home health nurse, Urgent Care, ED, 911  Yes   Week 3 call completed?  Yes          Tamiko Yang RN

## 2021-03-31 ENCOUNTER — READMISSION MANAGEMENT (OUTPATIENT)
Dept: CALL CENTER | Facility: HOSPITAL | Age: 66
End: 2021-03-31

## 2021-03-31 NOTE — OUTREACH NOTE
CT Surgery Week 4 Survey      Responses   [facility] patient discharged from?  [place]   Does the patient have one of the following disease processes/diagnoses(primary or secondary)?  Cardiothoracic surgery   Week 4 attempt successful?  Yes   Call start time  1742   Call end time  1744   Discharge diagnosis  Aortic valve replacement,   hx of Covid 19   Meds reviewed with patient/caregiver?  Yes   Is the patient having any side effects they believe may be caused by any medication additions or changes?  No   Is the patient taking all medications as directed (includes completed medication regime)?  Yes   Has the patient kept scheduled appointments due by today?  Yes   Is the patient still receiving Home Health Services?  N/A   Psychosocial issues?  No   What is the patient's perception of their health status since discharge?  Improving   Nursing interventions  Nurse provided patient education   Is the patient/caregiver able to teach back steps to recovery at home?  Set small, achievable goals for return to baseline health, Rest and rebuild strength, gradually increase activity   If the patient is a current smoker, are they able to teach back resources for cessation?  Not a smoker   Is the patient/caregiver able to teach back the hierarchy of who to call/visit for symptoms/problems? PCP, Specialist, Home health nurse, Urgent Care, ED, 911  Yes   Week 4 Call Completed?  Yes   Would the patient like one additional call?  No   Graduated  Yes   Is the patient interested in additional calls from an ambulatory ?  NOTE:  applies to high risk patients requiring additional follow-up.  No   Did the patient feel the follow up calls were helpful during their recovery period?  Yes   Was the number of calls appropriate?  Yes          Maribel Rivera RN

## 2021-04-08 ENCOUNTER — OFFICE VISIT (OUTPATIENT)
Dept: CARDIAC SURGERY | Facility: CLINIC | Age: 66
End: 2021-04-08

## 2021-04-08 VITALS
HEART RATE: 70 BPM | DIASTOLIC BLOOD PRESSURE: 79 MMHG | SYSTOLIC BLOOD PRESSURE: 131 MMHG | OXYGEN SATURATION: 98 % | HEIGHT: 74 IN | WEIGHT: 259 LBS | BODY MASS INDEX: 33.24 KG/M2 | TEMPERATURE: 97.3 F

## 2021-04-08 DIAGNOSIS — Z95.2 S/P AVR: ICD-10-CM

## 2021-04-08 DIAGNOSIS — I71.20 THORACIC AORTIC ANEURYSM WITHOUT RUPTURE (HCC): ICD-10-CM

## 2021-04-08 DIAGNOSIS — I48.91 NEW ONSET ATRIAL FIBRILLATION (HCC): ICD-10-CM

## 2021-04-08 DIAGNOSIS — Q23.1 BICUSPID AORTIC VALVE: Primary | ICD-10-CM

## 2021-04-08 PROCEDURE — 71046 X-RAY EXAM CHEST 2 VIEWS: CPT | Performed by: NURSE PRACTITIONER

## 2021-04-08 PROCEDURE — 99024 POSTOP FOLLOW-UP VISIT: CPT | Performed by: NURSE PRACTITIONER

## 2021-04-08 RX ORDER — ATORVASTATIN CALCIUM 40 MG/1
TABLET, FILM COATED ORAL DAILY
COMMUNITY
Start: 2021-03-18 | End: 2021-05-19 | Stop reason: SDUPTHER

## 2021-04-08 NOTE — PROGRESS NOTES
[facility] Cardiothoracic Surgery Office Follow Up Note     Date of Encounter: 04/08/2021     MRN Number: [id number]  Name: Yeison Bryant  Phone Number: [phone]     Referred By: No ref. provider found  PCP: Kreis, Samuel Duane, MD    Chief Complaint:    Chief Complaint   Patient presents with   • Post-op Follow-up     hosp f/u S/P AVR/ MAZE with ROM on 3/3       History of Present Illness:    Yeison Bryant is a 65 y.o. male with a history of former tobacco use, cardiomyopathy (preop EF 15 to 20%), A. fib/ascending thoracic aortic aneurysm and bicuspid arctic valve s/p aortic valve replacement with bioprosthetic tissue valve, replacement and repair of a sending aortic aneurysm with Dacron graft, Maze procedure and left femoral arterial cutdown on 3/3/2021 with Dr. Guerrier.  He presents today in postoperative follow-up.  Postoperatively, patient did have recurrent atrial fibrillation as well as a right pneumothorax s/p chest tube.  He has been doing well and walking on a daily basis.  He is interested in cardiac rehab.  Plans to follow-up with Dr. Waldron in June with repeat TTE.  He does report that since surgery he has been having enlargement in his left groin incision as well as pain.  He denies any drainage, erythema, fevers or chills.    Review of Systems:  Review of Systems   Constitutional: Positive for decreased appetite, malaise/fatigue and weight loss. Negative for chills and fever.   Cardiovascular: Negative for chest pain, claudication, dyspnea on exertion, irregular heartbeat, leg swelling, near-syncope, orthopnea, palpitations and syncope.   Respiratory: Negative for cough, hemoptysis, shortness of breath, sputum production and wheezing.    Hematologic/Lymphatic: Negative for bleeding problem. Does not bruise/bleed easily.   Skin: Positive for poor wound healing ( groin incision is swelling and has a knot ). Negative for color change and rash.   Musculoskeletal: Negative for back pain,  falls and joint pain.   Gastrointestinal: Negative for abdominal pain, constipation, diarrhea, nausea and vomiting.   Neurological: Negative for focal weakness, numbness and paresthesias.   Psychiatric/Behavioral: Negative for depression. The patient has insomnia.        I have reviewed the review of systems as entered by my clinical support staff and have updated it as appropriate.       Allergies:  No Known Allergies    Medications:      Current Outpatient Medications:   •  apixaban (ELIQUIS) 5 MG tablet tablet, Take 1 tablet by mouth Every 12 (Twelve) Hours. Indications: Atrial Fibrillation (Patient taking differently: Take 5 mg by mouth Every 12 (Twelve) Hours. Indications: Atrial Fibrillation, Atrial Fibrillation), Disp: 60 tablet, Rfl: 11  •  aspirin 81 MG EC tablet, Take 1 tablet by mouth Daily., Disp: 100 tablet, Rfl: 4  •  atorvastatin (LIPITOR) 40 MG tablet, , Disp: , Rfl:   •  carvedilol (Coreg) 25 MG tablet, Take 1 tablet by mouth 2 (Two) Times a Day With Meals., Disp: 60 tablet, Rfl: 5  •  guaiFENesin (MUCINEX) 600 MG 12 hr tablet, Take 600 mg by mouth Daily. As needed, Disp: , Rfl:   •  lisinopril (PRINIVIL,ZESTRIL) 10 MG tablet, Take 1 tablet by mouth Daily., Disp: 30 tablet, Rfl: 11  •  multivitamin with minerals (MULTIVITAMIN ADULTS PO), Take 1 tablet by mouth Daily., Disp: , Rfl:   •  atorvastatin (LIPITOR) 80 MG tablet, Take 1 tablet by mouth Every Night., Disp: , Rfl:   •  tamsulosin (FLOMAX) 0.4 MG capsule 24 hr capsule, Take 1 capsule by mouth Daily., Disp: 30 capsule, Rfl: 0    Social History     Socioeconomic History   • Marital status:      Spouse name: Not on file   • Number of children: 2   • Years of education: Not on file   • Highest education level: Not on file   Tobacco Use   • Smoking status: Former Smoker     Packs/day: 2.00     Years: 36.00     Pack years: 72.00     Types: Cigarettes     Quit date: 2008     Years since quittin.2   • Smokeless tobacco: Never Used  "  Vaping Use   • Vaping Use: Never used   Substance and Sexual Activity   • Alcohol use: Yes     Comment: OCCASIONAL ALCOHOL    • Drug use: Never   • Sexual activity: Defer       Family History   Adopted: Yes   Family history unknown: Yes       Past Medical History:   Diagnosis Date   • Arrhythmia    • Arthritis    • Atrial fibrillation (CMS/[facility])    • Cataract    • CHF (congestive heart failure) (CMS/HCC) 01/18/2021   • COVID-19 12/2020   • COVID-19 vaccine administered 03/11/2021   • Dyslipidemia    • Former smoker    • History of gout 2011   • History of pneumonia 1974   • Hypertension    • Obesity (BMI 30-39.9)    • SOB (shortness of breath)        Past Surgical History:   Procedure Laterality Date   • AORTIC VALVE REPAIR/REPLACEMENT N/A 3/3/2021    Procedure: MEDIAN STERNOTOMY, MAZE, AORTIC VALVE REPLACEMENT WITH LEFT FEMORAL ARTERIAL CANNULATION;  Surgeon: Evgeny Guerrier MD;  Location: [facility] OR;  Service: Cardiothoracic;  Laterality: N/A;   • AORTIC VALVE SURGERY     • CARDIAC CATHETERIZATION Left 1/25/2021    Procedure: Left Heart Cath - COVID+ 12/27;  Surgeon: Mahendra Waldron MD;  Location:  [facility] CATH INVASIVE LOCATION;  Service: Cardiology;  Laterality: Left;   • CARDIAC CATHETERIZATION N/A 1/25/2021    Procedure: Aortic root aortogram;  Surgeon: Mahendra Waldron MD;  Location:  [facility] CATH INVASIVE LOCATION;  Service: Cardiology;  Laterality: N/A;   • CATARACT EXTRACTION     • COLONOSCOPY     • EYE SURGERY      lasic and cataract   • KNEE SURGERY Bilateral    • THORACIC AORTIC ANEURYSM REPAIR N/A 3/3/2021    Procedure: ASCENDING THORACIC AORTIC ANEURYSM REPAIR, DIONISIO PER ANESTHESIA;  Surgeon: Evgeny Guerrier MD;  Location:  [facility] OR;  Service: Cardiothoracic;  Laterality: N/A;   • TONSILLECTOMY         Physical Exam:  Vital Signs:    Vitals:    04/08/21 1329   BP: 131/79   BP Location: Left arm   Pulse: 70   Temp: 97.3 °F (36.3 °C)   SpO2: 98%   Weight: 117 kg (259 lb)   Height: 188 cm (74\") "     Body mass index is 33.25 kg/m².     Physical Exam  Vitals and nursing note reviewed.   Constitutional:       Appearance: Normal appearance. He is well-developed and well-groomed.   HENT:      Head: Normocephalic and atraumatic.   Cardiovascular:      Rate and Rhythm: Normal rate and regular rhythm.      Pulses:           Dorsalis pedis pulses are 2+ on the right side and 2+ on the left side.        Posterior tibial pulses are 2+ on the right side and 2+ on the left side.      Heart sounds: Normal heart sounds, S1 normal and S2 normal. No murmur heard.   No friction rub.      Comments: Left femoral +thrill.  No bruit  Pulmonary:      Comments: Unlabored, Clear to auscultation bilaterally  Abdominal:      General: Bowel sounds are normal.      Palpations: Abdomen is soft.      Tenderness: There is no abdominal tenderness.   Musculoskeletal:      Cervical back: Neck supple.      Right lower leg: No edema.      Left lower leg: No edema.   Skin:     General: Skin is warm and dry.      Comments: Incisions:  Sternal/MT sites Intact  No surrounding erythema, hematoma or induration    Left groin incision with noted hematoma, tender to palpation.  No surrounding erythema, no drainage   Neurological:      Mental Status: He is alert and oriented to person, place, and time.   Psychiatric:         Attention and Perception: Attention normal.         Mood and Affect: Mood normal.         Speech: Speech normal.         Behavior: Behavior is cooperative.         Labs/Imaging:    XR Chest 2 View    Result Date: 4/9/2021  Postoperative changes as above without evidence of acute disease.  This report was finalized on 4/9/2021 12:10 PM by Bruno Milian.         Assessment / Plan:  Diagnoses and all orders for this visit:    1. Bicuspid aortic valve (S/P 27 trifecta bioprosthetic tissue valve 3/3/21) (Primary)    2. S/P AVR    3. Acending thoracic aortic aneurysm (Repair/replace w/ woven Dacron graft size 30 mm 3/3/21)    4. New  onset atrial fibrillation (S/p Maze w/ Pulmonary Vein Ablation 3/3/21))     Yeison Bryant is a 65 y.o. male with a history of former tobacco use, cardiomyopathy (preop EF 15 to 20%), A. fib/ascending thoracic aortic aneurysm and bicuspid arctic valve s/p aortic valve replacement with bioprosthetic tissue valve, replacement and repair of ascending aortic aneurysm with Dacron graft, Maze procedure and left femoral arterial cutdown on 3/3/2021 with Dr. Guerrier.  Patient is doing well from postoperative standpoint.  He is walking daily with stable chest x-ray.  Left groin femoral cutdown incision with noted hematoma, pain and tender to deep palpation with thrill.  Will rule out pseudoaneurysm with ultrasound and notify patient of results (CTA in Feb 2021).  Will discuss patient with Dr. Guerrier if he has further recommendations.  We will follow-up with patient in 4 weeks otherwise earlier as needed.    DORIS Carvalho  [facility] Cardiothoracic Surgery    Please note that portions of this note may have been completed with a voice recognition program. Efforts were made to edit the dictations, but occasionally words are mistranscribed.

## 2021-04-09 LAB
QT INTERVAL: 378 MS
QTC INTERVAL: 464 MS

## 2021-04-12 ENCOUNTER — TELEPHONE (OUTPATIENT)
Dept: CARDIAC SURGERY | Facility: CLINIC | Age: 66
End: 2021-04-12

## 2021-04-12 NOTE — TELEPHONE ENCOUNTER
Asking if he can go to the dentist, needs to get a filling fixed or replaced.  I spoke with [facility] and he says he can get tooth fixed, but needs to let the dentist know he will need to be put on antibiotics.

## 2021-04-14 ENCOUNTER — TELEPHONE (OUTPATIENT)
Dept: CARDIAC SURGERY | Facility: CLINIC | Age: 66
End: 2021-04-14

## 2021-04-14 DIAGNOSIS — R10.32 LEFT GROIN PAIN: Primary | ICD-10-CM

## 2021-04-14 DIAGNOSIS — Q23.1 BICUSPID AORTIC VALVE: ICD-10-CM

## 2021-04-14 NOTE — TELEPHONE ENCOUNTER
4/14/21 Patient called wanting to know when his ultrasound of his leg was going to be scheduled.  Patient scheduled for 4/15/21 @ 4 pm at [facility] [place]. Pt. Confirmed he will come to [facility] For appt. And actually prefers all testing to be done at [facility] As opposed to the [facility] which he lives closer to.    Patient is also to have Cardiac Rehab and that referral is for [facility] and he is agreeable to going to that facility for the Rehab. , but has not heard from anyone with that appt. Date. -[facility]

## 2021-04-15 ENCOUNTER — TELEMEDICINE (OUTPATIENT)
Dept: CARDIOLOGY | Facility: HOSPITAL | Age: 66
End: 2021-04-15

## 2021-04-15 ENCOUNTER — HOSPITAL ENCOUNTER (EMERGENCY)
Facility: HOSPITAL | Age: 66
Discharge: HOME | End: 2021-04-15
Attending: EMERGENCY MEDICINE | Admitting: EMERGENCY MEDICINE

## 2021-04-15 ENCOUNTER — HOSPITAL ENCOUNTER (OUTPATIENT)
Dept: CARDIOLOGY | Facility: HOSPITAL | Age: 66
Discharge: HOME | End: 2021-04-15

## 2021-04-15 ENCOUNTER — TELEPHONE (OUTPATIENT)
Dept: CARDIOLOGY | Facility: HOSPITAL | Age: 66
End: 2021-04-15

## 2021-04-15 ENCOUNTER — APPOINTMENT (OUTPATIENT)
Dept: CT IMAGING | Facility: HOSPITAL | Age: 66
End: 2021-04-15

## 2021-04-15 ENCOUNTER — HOSPITAL ENCOUNTER (OUTPATIENT)
Dept: CARDIOLOGY | Facility: HOSPITAL | Age: 66
Discharge: HOME | End: 2021-04-15
Admitting: NURSE PRACTITIONER

## 2021-04-15 VITALS
BODY MASS INDEX: 32.73 KG/M2 | OXYGEN SATURATION: 98 % | TEMPERATURE: 98 F | RESPIRATION RATE: 18 BRPM | HEART RATE: 71 BPM | WEIGHT: 255 LBS | DIASTOLIC BLOOD PRESSURE: 95 MMHG | SYSTOLIC BLOOD PRESSURE: 149 MMHG | HEIGHT: 74 IN

## 2021-04-15 VITALS
WEIGHT: 255 LBS | BODY MASS INDEX: 32.73 KG/M2 | DIASTOLIC BLOOD PRESSURE: 79 MMHG | SYSTOLIC BLOOD PRESSURE: 130 MMHG | HEART RATE: 77 BPM | HEIGHT: 74 IN

## 2021-04-15 DIAGNOSIS — Q23.1 BICUSPID AORTIC VALVE: ICD-10-CM

## 2021-04-15 DIAGNOSIS — Z98.890 POST-OPERATIVE STATE: ICD-10-CM

## 2021-04-15 DIAGNOSIS — R10.32 LEFT GROIN PAIN: ICD-10-CM

## 2021-04-15 DIAGNOSIS — S30.1XXA ABDOMINAL WALL SEROMA, INITIAL ENCOUNTER: Primary | ICD-10-CM

## 2021-04-15 DIAGNOSIS — I10 ELEVATED BLOOD PRESSURE READING WITH DIAGNOSIS OF HYPERTENSION: ICD-10-CM

## 2021-04-15 DIAGNOSIS — I48.0 PAF (PAROXYSMAL ATRIAL FIBRILLATION) (HCC): ICD-10-CM

## 2021-04-15 DIAGNOSIS — R00.2 PALPITATIONS: ICD-10-CM

## 2021-04-15 DIAGNOSIS — I50.22 CHRONIC SYSTOLIC HEART FAILURE (HCC): Primary | ICD-10-CM

## 2021-04-15 DIAGNOSIS — I71.20 THORACIC AORTIC ANEURYSM WITHOUT RUPTURE (HCC): ICD-10-CM

## 2021-04-15 LAB
ABO GROUP BLD: NORMAL
ALBUMIN SERPL-MCNC: 4.2 G/DL (ref 3.5–5.2)
ALBUMIN/GLOB SERPL: 1.1 G/DL
ALP SERPL-CCNC: 158 U/L (ref 39–117)
ALT SERPL W P-5'-P-CCNC: 18 U/L (ref 1–41)
ANION GAP SERPL CALCULATED.3IONS-SCNC: 12 MMOL/L (ref 5–15)
APTT PPP: 39.2 SECONDS (ref 22–39)
AST SERPL-CCNC: 24 U/L (ref 1–40)
BASOPHILS # BLD AUTO: 0.05 10*3/MM3 (ref 0–0.2)
BASOPHILS NFR BLD AUTO: 0.5 % (ref 0–1.5)
BILIRUB SERPL-MCNC: 0.8 MG/DL (ref 0–1.2)
BLD GP AB SCN SERPL QL: NEGATIVE
BUN SERPL-MCNC: 15 MG/DL (ref 8–23)
BUN/CREAT SERPL: 18.8 (ref 7–25)
CALCIUM SPEC-SCNC: 9.5 MG/DL (ref 8.6–10.5)
CHLORIDE SERPL-SCNC: 104 MMOL/L (ref 98–107)
CO2 SERPL-SCNC: 26 MMOL/L (ref 22–29)
CREAT SERPL-MCNC: 0.8 MG/DL (ref 0.76–1.27)
DEPRECATED RDW RBC AUTO: 43.7 FL (ref 37–54)
EOSINOPHIL # BLD AUTO: 0.13 10*3/MM3 (ref 0–0.4)
EOSINOPHIL NFR BLD AUTO: 1.3 % (ref 0.3–6.2)
ERYTHROCYTE [DISTWIDTH] IN BLOOD BY AUTOMATED COUNT: 13.3 % (ref 12.3–15.4)
GFR SERPL CREATININE-BSD FRML MDRD: 97 ML/MIN/1.73
GLOBULIN UR ELPH-MCNC: 3.7 GM/DL
GLUCOSE SERPL-MCNC: 89 MG/DL (ref 65–99)
HCT VFR BLD AUTO: 43.2 % (ref 37.5–51)
HGB BLD-MCNC: 13.5 G/DL (ref 13–17.7)
IMM GRANULOCYTES # BLD AUTO: 0.03 10*3/MM3 (ref 0–0.05)
IMM GRANULOCYTES NFR BLD AUTO: 0.3 % (ref 0–0.5)
INR PPP: 1.12 (ref 0.85–1.16)
LYMPHOCYTES # BLD AUTO: 2.52 10*3/MM3 (ref 0.7–3.1)
LYMPHOCYTES NFR BLD AUTO: 24.8 % (ref 19.6–45.3)
MCH RBC QN AUTO: 28.2 PG (ref 26.6–33)
MCHC RBC AUTO-ENTMCNC: 31.3 G/DL (ref 31.5–35.7)
MCV RBC AUTO: 90.2 FL (ref 79–97)
MONOCYTES # BLD AUTO: 0.71 10*3/MM3 (ref 0.1–0.9)
MONOCYTES NFR BLD AUTO: 7 % (ref 5–12)
NEUTROPHILS NFR BLD AUTO: 6.71 10*3/MM3 (ref 1.7–7)
NEUTROPHILS NFR BLD AUTO: 66.1 % (ref 42.7–76)
NRBC BLD AUTO-RTO: 0 /100 WBC (ref 0–0.2)
PLATELET # BLD AUTO: 269 10*3/MM3 (ref 140–450)
PMV BLD AUTO: 8.9 FL (ref 6–12)
POTASSIUM SERPL-SCNC: 4.2 MMOL/L (ref 3.5–5.2)
PROT SERPL-MCNC: 7.9 G/DL (ref 6–8.5)
PROTHROMBIN TIME: 14.1 SECONDS (ref 11.4–14.4)
RBC # BLD AUTO: 4.79 10*6/MM3 (ref 4.14–5.8)
RH BLD: POSITIVE
SODIUM SERPL-SCNC: 142 MMOL/L (ref 136–145)
T&S EXPIRATION DATE: NORMAL
WBC # BLD AUTO: 10.15 10*3/MM3 (ref 3.4–10.8)

## 2021-04-15 PROCEDURE — 85610 PROTHROMBIN TIME: CPT | Performed by: EMERGENCY MEDICINE

## 2021-04-15 PROCEDURE — 85730 THROMBOPLASTIN TIME PARTIAL: CPT | Performed by: EMERGENCY MEDICINE

## 2021-04-15 PROCEDURE — 99214 OFFICE O/P EST MOD 30 MIN: CPT | Performed by: NURSE PRACTITIONER

## 2021-04-15 PROCEDURE — 86850 RBC ANTIBODY SCREEN: CPT | Performed by: EMERGENCY MEDICINE

## 2021-04-15 PROCEDURE — 80053 COMPREHEN METABOLIC PANEL: CPT | Performed by: EMERGENCY MEDICINE

## 2021-04-15 PROCEDURE — 75635 CT ANGIO ABDOMINAL ARTERIES: CPT

## 2021-04-15 PROCEDURE — 0 IOPAMIDOL PER 1 ML: Performed by: EMERGENCY MEDICINE

## 2021-04-15 PROCEDURE — 99284 EMERGENCY DEPT VISIT MOD MDM: CPT

## 2021-04-15 PROCEDURE — 93270 REMOTE 30 DAY ECG REV/REPORT: CPT

## 2021-04-15 PROCEDURE — 86901 BLOOD TYPING SEROLOGIC RH(D): CPT | Performed by: EMERGENCY MEDICINE

## 2021-04-15 PROCEDURE — 85025 COMPLETE CBC W/AUTO DIFF WBC: CPT | Performed by: EMERGENCY MEDICINE

## 2021-04-15 PROCEDURE — 93926 LOWER EXTREMITY STUDY: CPT

## 2021-04-15 PROCEDURE — 93926 LOWER EXTREMITY STUDY: CPT | Performed by: INTERNAL MEDICINE

## 2021-04-15 PROCEDURE — 86900 BLOOD TYPING SEROLOGIC ABO: CPT | Performed by: EMERGENCY MEDICINE

## 2021-04-15 RX ORDER — AMOXICILLIN 500 MG/1
2000 TABLET, FILM COATED ORAL AS NEEDED
COMMUNITY
Start: 2021-04-13 | End: 2021-04-15

## 2021-04-15 RX ORDER — FLUTICASONE PROPIONATE 50 MCG
2 SPRAY, SUSPENSION (ML) NASAL DAILY PRN
COMMUNITY

## 2021-04-15 RX ORDER — COLCHICINE 0.6 MG/1
0.6 TABLET ORAL DAILY PRN
COMMUNITY
End: 2022-03-15

## 2021-04-15 RX ORDER — SODIUM CHLORIDE 0.9 % (FLUSH) 0.9 %
10 SYRINGE (ML) INJECTION AS NEEDED
Status: DISCONTINUED | OUTPATIENT
Start: 2021-04-15 | End: 2021-04-15 | Stop reason: HOSPADM

## 2021-04-15 RX ADMIN — IOPAMIDOL 150 ML: 755 INJECTION, SOLUTION INTRAVENOUS at 19:06

## 2021-04-15 NOTE — CONSULTS
"CTS Consult    Patient Care Team:  Kreis, Samuel Duane, MD as PCP - General (Family Medicine)  Mabel Flores MD as Consulting Physician (Cardiology)      Reason for Consult: Left groin mass at site of surgical incision    HPI  Patient is a 65 y.o. male who is being seen in the emergency department here [facility] for a complaint of a palpable mass in his left groin.  Patient is s/p AVR/MAZE with left femoral cutdown on 3/3.  He was discharged from the hospital on 3/20.  He was recently seen for his 1 month post surgical follow-up on 4/8.  Patient states that he has felt this mass since he got home from the hospital, and has slowly grown in size since then.  He mentioned this while at his 1 month follow-up, which prompted a duplex lower extremity, which was performed yesterday.  This showed a large mass of \"unknown etiology\"measuring 9.02 x 7.4 x 9.69.  The patient's cardiologist saw this, and encouraged patient to come to the emergency department.  Patient states that this mass is sometimes mildly tender, but has grown in size significantly since discharge.  Patient denies chest pain, shortness of breath, lower extremity weakness, numbness or tingling.  It should be noted that patient does take Eliquis daily.      Review of Systems    A comprehensive review of systems was negative except for:   Constitutional: Denies fever, chills, weakness, fatigue.  Respiratory: Denies cough, shortness of breath, or wheezing.  Cardiovascular: Denies chest pain, palpitation, or syncope.  Gastrointestinal: Denies abdominal pain, nausea, vomiting, or diarrhea.  Musculoskeletal: Admits to a palpable mass in his left groin.  Neurological: Denies weakness or numbness in the lower extremities.      History  Past Medical History:   Diagnosis Date   • Arrhythmia    • Arthritis    • Atrial fibrillation (CMS/[facility])    • Cataract    • CHF (congestive heart failure) (CMS/HCC) 01/18/2021   • COVID-19 12/2020   • COVID-19 vaccine " administered 2021   • Dyslipidemia    • Former smoker    • History of gout    • History of pneumonia    • Hypertension    • Obesity (BMI 30-39.9)    • SOB (shortness of breath)      Past Surgical History:   Procedure Laterality Date   • AORTIC VALVE REPAIR/REPLACEMENT N/A 3/3/2021    Procedure: MEDIAN STERNOTOMY, MAZE, AORTIC VALVE REPLACEMENT WITH LEFT FEMORAL ARTERIAL CANNULATION;  Surgeon: Evgeny Guerrier MD;  Location:  [facility] OR;  Service: Cardiothoracic;  Laterality: N/A;   • AORTIC VALVE SURGERY     • CARDIAC CATHETERIZATION Left 2021    Procedure: Left Heart Cath - COVID+ ;  Surgeon: Mahendra Waldron MD;  Location:  [facility] CATH INVASIVE LOCATION;  Service: Cardiology;  Laterality: Left;   • CARDIAC CATHETERIZATION N/A 2021    Procedure: Aortic root aortogram;  Surgeon: Mahendra Waldron MD;  Location:  [facility] CATH INVASIVE LOCATION;  Service: Cardiology;  Laterality: N/A;   • CATARACT EXTRACTION     • COLONOSCOPY     • EYE SURGERY      lasic and cataract   • KNEE SURGERY Bilateral    • THORACIC AORTIC ANEURYSM REPAIR N/A 3/3/2021    Procedure: ASCENDING THORACIC AORTIC ANEURYSM REPAIR, DIONISIO PER ANESTHESIA;  Surgeon: Evgeny Guerrier MD;  Location:  [facility] OR;  Service: Cardiothoracic;  Laterality: N/A;   • TONSILLECTOMY       Family History   Adopted: Yes   Family history unknown: Yes     Social History     Tobacco Use   • Smoking status: Former Smoker     Packs/day: 2.00     Years: 36.00     Pack years: 72.00     Types: Cigarettes     Quit date: 2008     Years since quittin.2   • Smokeless tobacco: Never Used   Vaping Use   • Vaping Use: Never used   Substance Use Topics   • Alcohol use: Yes     Comment: OCCASIONAL ALCOHOL    • Drug use: Never     (Not in a hospital admission)    Allergies:  Patient has no known allergies.    Objective    Vital Signs  Temp:  [98 °F (36.7 °C)] 98 °F (36.7 °C)  Heart Rate:  [] 104  Resp:  [18] 18  BP: (128-147)/()  "141/107    Physical Exam:  General Appearance: alert, appears stated age and cooperative  Head: normocephalic, without obvious abnormality and atraumatic  Lungs: clear to auscultation, respirations regular, respirations even and respirations unlabored  Heart: regular rhythm & normal rate, normal S1, S2, no murmur, no gallop, no rub and no click  Abdomen: Palpable hard, nonmobile mass of left groin at incision site.  No erythema noted.  Nontender.  Extremities: Equal strength in lower extremities bilaterally.  Pulses: PT and DP pulses palpable and found with Doppler.  Skin: Surgical incision site of left groin.  See \"abdomen\"  Neurologic: Normal sensation of lower extremity.          Data Review:  Results from last 7 days   Lab Units 04/15/21  1807   WBC 10*3/mm3 10.15   HEMOGLOBIN g/dL 13.5   HEMATOCRIT % 43.2   PLATELETS 10*3/mm3 269     Results from last 7 days   Lab Units 04/15/21  1807   SODIUM mmol/L 142   POTASSIUM mmol/L 4.2   CHLORIDE mmol/L 104   CO2 mmol/L 26.0   BUN mg/dL 15   CREATININE mg/dL 0.80   GLUCOSE mg/dL 89   CALCIUM mg/dL 9.5     Coagulation:   INR   Date Value Ref Range Status   04/15/2021 1.12 0.85 - 1.16 Final         Imaging Results (Last 72 Hours)     ** No results found for the last 72 hours. **            Assessment:      Post-surgical Hematoma      Plan:  We will order a CT of the abdominal aorta with runoff and 3D reconstruction for this evening.  After speaking great today with Dr. Evgeny Guerrier on this case, we feel that this is most likely a postsurgical hematoma.  We will tentatively plan to see this patient outpatient in the office at 8am Monday morning to discuss the next course of action for this.  Patient voiced understanding of this plan and agreed.      Porfirio Tan PA-C  04/15/21  18:49 EDT      "

## 2021-04-15 NOTE — PROGRESS NOTES
Chief Complaint  Follow-up and Congestive Heart Failure    Subjective    History of Present Illness {CC  Problem List  Visit  Diagnosis   Encounters  Notes  Medications  Labs  Result Review Imaging  Media :23}   You have chosen to receive care through the use of telemedicine. Telemedicine enables health care providers at different locations to provide safe, effective, and convenient care through the use of technology. As with any health care service, there are risks associated with the use of telemedicine, including equipment failure, poor connections, and  issues.    • Do you understand the risks and benefits of telemedicine as I have explained them to you? Yes  • Have your questions regarding telemedicine been answered? Yes  • Do you consent to the use of telemedicine in your medical care today? Yes      History of Present Illness   65-year-old male with telemedicine visit today for ongoing evaluation of his chronic systolic heart failure.  Patient underwent a finding thoracic aortic aneurysm bicuspid aortic valve replacement, maze procedure and left femoral arterial cutdown on 3/3/2021 per Dr. Guerrier.  Postoperatively patient did have recurrent atrial fibrillation and a right pneumothorax (status post chest tube).  Patient reports he is walking daily and is eager to start cardiac rehab.  He does report swelling in his left groin and is scheduled for a lower extremity duplex.  He does report decreased appetite, ongoing fatigue and weight loss of 20 pounds since his surgery.  Also reports sleep patterns have been different since surgery and he is unable to sleep through the night.  He currently denies chest pain, dyspnea, presyncope, syncope, orthopnea, PND or pedal edema.  He does report numbness in his right fifth digit that he reports is improving but is still present.  Patient does report that he is having A. fib on a regular basis and he is monitoring not on his blood pressure  "cuff.  Patient is unable to tell me how high his heart rate is or how long the atrial fibrillation is lasting.  Objective     Vital Signs:   Vitals:    04/15/21 0817   BP: 130/79   BP Location: Left arm   Patient Position: Sitting   Pulse: 77   Weight: 116 kg (255 lb)   Height: 188 cm (74\")     Body mass index is 32.74 kg/m².  Physical Exam  Vitals and nursing note reviewed.   Constitutional:       Appearance: Normal appearance.   HENT:      Head: Normocephalic.   Pulmonary:      Effort: Pulmonary effort is normal.   Neurological:      Mental Status: He is alert and oriented to person, place, and time.   Psychiatric:         Mood and Affect: Mood normal.         Behavior: Behavior normal.         Thought Content: Thought content normal.              Result Review  Data Reviewed:{ Labs  Result Review  Imaging  Med Tab  Media :23}   Basic Metabolic Panel (03/09/2021 06:51)  Hemoglobin & Hematocrit, Blood (03/09/2021 06:51)  Magnesium (03/08/2021 04:08)  Adult Transthoracic Echo Limited W/ Cont if Necessary Per Protocol (01/26/2021 14:40)  Adult Transesophageal Echo (DIONISIO) W/ Cont if Necessary Per Protocol (01/26/2021 13:09)             Assessment and Plan {CC Problem List  Visit Diagnosis  ROS  Review (Popup)  Health Maintenance  Quality  BestPractice  Medications  SmartSets  SnapShot Encounters  Media :23}   1. Chronic systolic heart failure (CMS/HCC)  euvolemic  nyha 1    2. PAF (paroxysmal atrial fibrillation) (CMS/HCC)  CHADS-VASc Risk Assessment            3 Total Score    1 CHF    1 Hypertension    1 Age 65-74        Criteria that do not apply:    Age >/= 75    DM    PRIOR STROKE/TIA/THROMBO    Vascular Disease    Sex: Female        Anticoagulated with eliquis and denies any s/s of bleeding  Will order- Mobile Cardiac Outpatient Telemetry to assess burden of afib     3. Thoracic aortic aneurysm without rupture (CMS/HCC)  S/p repair 3/3/21  4. Bicuspid aortic valve (S/P 27 trifecta bioprosthetic " tissue valve 3/3/21)            Follow Up {Instructions Charge Capture  Follow-up Communications :23}   No follow-ups on file.    Patient was given instructions and counseling regarding his condition or for health maintenance advice. Please see specific information pulled into the AVS if appropriate.  Patient was instructed to call the [facility] with any questions, concerns, or worsening symptoms.    *Please note that portions of this note were completed with a voice recognition program. Efforts were made to edit the dictations, but occasionally words are mistranscribed.  You have chosen to receive care through the use of telemedicine.

## 2021-04-15 NOTE — TELEPHONE ENCOUNTER
Patient stated him D/C his lisinopril was dicussed during his tele visit, patent stated he is anxious to get off the medication and would like to know how long it will be before he can replace the lisinopril.

## 2021-04-15 NOTE — ED PROVIDER NOTES
"Subjective   The patient is a 65 y.o. year old male presenting to the ED complaining of a post-operative problem from his coronary artery bypass graft and cardiac catheterization procedures. These procedures were performed on 3/3/21. Since 3/4, the patient noticed a \"knot\" in his groin that worsens with walking or movement. He denies numbness or tingling of his legs. He denies chest pain, shortness of breath, nausea, and vomiting. Dr. Morton, cardiology, was concerned after seeing the result of an ultrasound of his groin and sent the patient here today for a CT. The patient is currently on Eliquis. The patient has pertinent medical history of hypertension, atrial fibrillation, and congestive heart failure. The patient currently lists no other acute symptoms at this time.      History provided by:  Patient  Groin Pain  Location:  Left inguinal region  Quality:  Swelling  Severity:  Moderate  Onset quality:  Gradual  Duration:  6 weeks  Timing:  Constant  Progression:  Worsening  Chronicity:  New  Context:  Post-operative swelling of inguinal region  Worsened by:  Walking, movement  Associated symptoms: no chest pain, no nausea, no shortness of breath and no vomiting    Risk factors:  Hypertension, congestive heart failure      Review of Systems   Respiratory: Negative for shortness of breath.    Cardiovascular: Negative for chest pain.   Gastrointestinal: Negative for nausea and vomiting.   Musculoskeletal:        Left inguinal swelling   All other systems reviewed and are negative.      Past Medical History:   Diagnosis Date   • Arrhythmia    • Arthritis    • Atrial fibrillation (CMS/[facility])    • Cataract    • CHF (congestive heart failure) (CMS/HCC) 01/18/2021   • COVID-19 12/2020   • COVID-19 vaccine administered 03/11/2021   • Dyslipidemia    • Former smoker    • History of gout 2011   • History of pneumonia 1974   • Hypertension    • Obesity (BMI 30-39.9)    • SOB (shortness of breath)        No Known " Allergies    Past Surgical History:   Procedure Laterality Date   • AORTIC VALVE REPAIR/REPLACEMENT N/A 3/3/2021    Procedure: MEDIAN STERNOTOMY, MAZE, AORTIC VALVE REPLACEMENT WITH LEFT FEMORAL ARTERIAL CANNULATION;  Surgeon: Evgeny Guerrier MD;  Location:  [facility];  Service: Cardiothoracic;  Laterality: N/A;   • AORTIC VALVE SURGERY     • CARDIAC CATHETERIZATION Left 2021    Procedure: Left Heart Cath - COVID+ ;  Surgeon: Mahendra Waldron MD;  Location:  [facility];  Service: Cardiology;  Laterality: Left;   • CARDIAC CATHETERIZATION N/A 2021    Procedure: Aortic root aortogram;  Surgeon: Mahendra Waldron MD;  Location:  [facility];  Service: Cardiology;  Laterality: N/A;   • CATARACT EXTRACTION     • COLONOSCOPY     • EYE SURGERY      lasic and cataract   • KNEE SURGERY Bilateral    • THORACIC AORTIC ANEURYSM REPAIR N/A 3/3/2021    Procedure: ASCENDING THORACIC AORTIC ANEURYSM REPAIR, DIONISIO PER ANESTHESIA;  Surgeon: Evgeny Guerrier MD;  Location:  [facility];  Service: Cardiothoracic;  Laterality: N/A;   • TONSILLECTOMY         Family History   Adopted: Yes   Family history unknown: Yes       Social History     Socioeconomic History   • Marital status:      Spouse name: Not on file   • Number of children: 2   • Years of education: Not on file   • Highest education level: Not on file   Tobacco Use   • Smoking status: Former Smoker     Packs/day: 2.00     Years: 36.00     Pack years: 72.00     Types: Cigarettes     Quit date: 2008     Years since quittin.2   • Smokeless tobacco: Never Used   Vaping Use   • Vaping Use: Never used   Substance and Sexual Activity   • Alcohol use: Yes     Comment: OCCASIONAL ALCOHOL    • Drug use: Never   • Sexual activity: Defer         Objective   Physical Exam  Vitals and nursing note reviewed.   Constitutional:       General: He is not in acute distress.     Appearance: He is well-developed.   HENT:       Head: Normocephalic and atraumatic.      Nose: Nose normal.   Eyes:      General: No scleral icterus.     Conjunctiva/sclera: Conjunctivae normal.   Cardiovascular:      Rate and Rhythm: Normal rate and regular rhythm.      Heart sounds: Normal heart sounds.   Pulmonary:      Effort: Pulmonary effort is normal. No respiratory distress.      Breath sounds: Normal breath sounds.   Abdominal:      Palpations: Abdomen is soft.      Tenderness: There is no abdominal tenderness.       Musculoskeletal:         General: Normal range of motion.      Cervical back: Normal range of motion and neck supple.   Skin:     General: Skin is warm and dry.      Comments: large area of firm swelling associated with healed incision site of inguinal left groin region with mild tenderness; no pulsatile flow   Neurological:      Mental Status: He is alert and oriented to person, place, and time.   Psychiatric:         Behavior: Behavior normal.         Procedures         ED Course  ED Course as of Apr 15 2359   Thu Apr 15, 2021   7239 I discussed this patient with the echo technician prior to his arrival.  Patient with recent intravascular procedure with concern for possible pseudoaneurysm.  Inconclusive ultrasound.  Patient sent here for CT imaging per the request of cardiology.    [RS]   2002 Personally discussed the CT scan with the radiologist.  Large fluid collection which the radiologist states is more favorable for a seroma.  See report from the radiologist for details.   CT Angio Abdominal Aorta Bilateral Iliofem Runoff [RS]   2003 CT surgery paged.    [RS]   2014 I discussed the case with Dr. Hollis who personally reviewed the operative report as well as the imaging studies performed this evening.  He states and agrees that it appears to be a seroma and that the patient can be discharged to follow-up with Dr. Guerrier as an outpatient.  He advised he will update Dr. Guerrier on the findings.    [RS]   2027 I updated the patient on  the findings and the plan.  He is very agreeable and has an appointment set up for Monday with Dr. Guerrier for follow-up. I had a discussion with the patient/family regarding diagnosis, diagnostic results, treatment plan, and medications.  The patient/family indicated understanding of these instructions.  I spent adequate time at the bedside proceeding discharge necessary to personally discuss the aftercare instructions, giving patient education, providing explanations of the results of our evaluations/findings, and my decision making to assure that the patient/family understand the plan of care.  Time was allotted to answer questions at that time and throughout the ED course.  Emphasis was placed on timely follow-up after discharge.  I also discussed the potential for the development of an acute emergent condition requiring further evaluation, admission, or even surgical intervention. I discussed that we found nothing during the visit today indicating the need for further workup, admission, or the presence of an unstable medical condition.  I encouraged the patient to return to the emergency department immediately for ANY concerns, worsening, new complaints, or if symptoms persist and unable to seek follow-up in a timely fashion.  The patient/family expressed understanding and agreement with this plan.      [RS]      ED Course User Index  [RS] Adrian Hoyos MD          Recent Results (from the past 24 hour(s))   Duplex Lower Extremity Art / Grafts - Left CAR    Collection Time: 04/15/21  5:20 PM   Result Value Ref Range    BSA 2.4 m^2    Left CFA prox sys PSV 89.6 cm/sec    PROX SFA PSV LEFT 102.0 cm/sec    MID SFA PSV LEFT 102.7 cm/sec    DIST SFA PSV LEFT -99.9 cm/sec    PROX POP A PSV LEFT 34.9 cm/sec    DIST PTA PSV LEFT 25.1 cm/sec     CV ECHO JOHN - BZI_BMI 32.7 kilograms/m^2     CV ECHO JOHN - BSA([place]) 2.5 m^2     CV ECHO JOHN - BZI_METRIC_WEIGHT 115.7 kg     CV ECHO JOHN -  BZI_METRIC_HEIGHT 188.0 cm    CFA Prox PSV-Left 93.00 cm/s    CFA Distal PSV-Left 89.00 cm/s    DFA Prox PSV-Left 42.00 cm/s    SFA Prox PSV-Left 101.00 cm/s    SFA Mid PSV-Left 102.00 cm/s    SFA Distal PSV-Left 99.00 cm/s    Popiteal A Prox PSV-Left 35.00 cm/s    PTA Distal PSV-Left 25.00 cm/s   Comprehensive Metabolic Panel    Collection Time: 04/15/21  6:07 PM    Specimen: Blood   Result Value Ref Range    Glucose 89 65 - 99 mg/dL    BUN 15 8 - 23 mg/dL    Creatinine 0.80 0.76 - 1.27 mg/dL    Sodium 142 136 - 145 mmol/L    Potassium 4.2 3.5 - 5.2 mmol/L    Chloride 104 98 - 107 mmol/L    CO2 26.0 22.0 - 29.0 mmol/L    Calcium 9.5 8.6 - 10.5 mg/dL    Total Protein 7.9 6.0 - 8.5 g/dL    Albumin 4.20 3.50 - 5.20 g/dL    ALT (SGPT) 18 1 - 41 U/L    AST (SGOT) 24 1 - 40 U/L    Alkaline Phosphatase 158 (H) 39 - 117 U/L    Total Bilirubin 0.8 0.0 - 1.2 mg/dL    eGFR Non African Amer 97 >60 mL/min/1.73    Globulin 3.7 gm/dL    A/G Ratio 1.1 g/dL    BUN/Creatinine Ratio 18.8 7.0 - 25.0    Anion Gap 12.0 5.0 - 15.0 mmol/L   Protime-INR    Collection Time: 04/15/21  6:07 PM    Specimen: Blood   Result Value Ref Range    Protime 14.1 11.4 - 14.4 Seconds    INR 1.12 0.85 - 1.16   aPTT    Collection Time: 04/15/21  6:07 PM    Specimen: Blood   Result Value Ref Range    PTT 39.2 (H) 22.0 - 39.0 seconds   CBC Auto Differential    Collection Time: 04/15/21  6:07 PM    Specimen: Blood   Result Value Ref Range    WBC 10.15 3.40 - 10.80 10*3/mm3    RBC 4.79 4.14 - 5.80 10*6/mm3    Hemoglobin 13.5 13.0 - 17.7 g/dL    Hematocrit 43.2 37.5 - 51.0 %    MCV 90.2 79.0 - 97.0 fL    MCH 28.2 26.6 - 33.0 pg    MCHC 31.3 (L) 31.5 - 35.7 g/dL    RDW 13.3 12.3 - 15.4 %    RDW-SD 43.7 37.0 - 54.0 fl    MPV 8.9 6.0 - 12.0 fL    Platelets 269 140 - 450 10*3/mm3    Neutrophil % 66.1 42.7 - 76.0 %    Lymphocyte % 24.8 19.6 - 45.3 %    Monocyte % 7.0 5.0 - 12.0 %    Eosinophil % 1.3 0.3 - 6.2 %    Basophil % 0.5 0.0 - 1.5 %    Immature Grans % 0.3  0.0 - 0.5 %    Neutrophils, Absolute 6.71 1.70 - 7.00 10*3/mm3    Lymphocytes, Absolute 2.52 0.70 - 3.10 10*3/mm3    Monocytes, Absolute 0.71 0.10 - 0.90 10*3/mm3    Eosinophils, Absolute 0.13 0.00 - 0.40 10*3/mm3    Basophils, Absolute 0.05 0.00 - 0.20 10*3/mm3    Immature Grans, Absolute 0.03 0.00 - 0.05 10*3/mm3    nRBC 0.0 0.0 - 0.2 /100 WBC   Type & Screen    Collection Time: 04/15/21  6:34 PM    Specimen: Blood   Result Value Ref Range    ABO Type A     RH type Positive     Antibody Screen Negative     T&S Expiration Date 4/18/2021 11:59:59 PM      Note: In addition to lab results from this visit, the labs listed above may include labs taken at another facility or during a different encounter within the last 24 hours. Please correlate lab times with ED admission and discharge times for further clarification of the services performed during this visit.    CT Angio Abdominal Aorta Bilateral Iliofem Runoff   Final Result   1. There is a large fluid collection in the patient's left inguinal region. There does not appear to be definite active extravasation or definite pseudoaneurysm. This is thought to most likely represent a seroma. However, subtle vascular injury cannot be   entirely excluded. If the patient's symptoms persist or worsen, consider follow-up with formal angiogram. This case was discussed with Dr. Hoyos at time of dictation.   2. There is severe narrowing with potential distal occlusion involving the patient's left anterior and posterior tibial arteries.      Signer Name: Suki Hunt MD    Signed: 4/15/2021 7:52 PM    Workstation Name: [id number]     [facility]        Vitals:    04/15/21 1816 04/15/21 1845 04/15/21 1945 04/15/21 2050   BP: (!) 141/107 123/90 (!) 131/102 149/95   BP Location:    Right arm   Patient Position:    Sitting   Pulse:   75 71   Resp:    18   Temp:       TempSrc:       SpO2: 97% 96% 96% 98%   Weight:       Height:         Medications   iopamidol  (ISOVUE-370) 76 % injection 150 mL (150 mL Intravenous Given 4/15/21 1906)     ECG/EMG Results (last 24 hours)     ** No results found for the last 24 hours. **        No orders to display                                         MDM  Number of Diagnoses or Management Options     Amount and/or Complexity of Data Reviewed  Clinical lab tests: reviewed  Tests in the radiology section of CPT®: reviewed  Discuss the patient with other providers: yes  Independent visualization of images, tracings, or specimens: yes        Final diagnoses:   Abdominal wall seroma, initial encounter   Post-operative state   Elevated blood pressure reading with diagnosis of hypertension     DISCHARGE    Patient discharged in stable condition.    Reviewed implications of results, diagnosis, meds, responsibility to follow up, warning signs and symptoms of possible worsening, potential complications and reasons to return to ER.    Patient/Family voiced understanding of above instructions.    Discussed plan for discharge, as there is no emergent indication for admission.  Pt/family is agreeable and understands need for follow up and possible repeat testing.  Pt/family is aware that discharge does not mean that nothing is wrong but that it indicates no emergency is currently present that requires admission and they must continue care with follow-up as given below or with a physician of their choice.     FOLLOW-UP  Kreis, Samuel Duane, MD  [address]   [place] [address] [phone]          [facility] Emergency Department  [address] [address] [phone]    As needed, If symptoms worsen or ANY concerns.    Evgeny Guerrier MD  [address]  [address]  [place] [address]  [phone]    Go on 4/19/2021  As scheduled.         Medication List      Changed    atorvastatin 40 MG tablet  Commonly known as: LIPITOR  What changed: Another medication with the same name was  removed. Continue taking this medication, and follow the directions you see here.        Stop    amoxicillin 500 MG tablet  Commonly known as: AMOXIL          Documentation assistance provided by paco Campbell.  Information recorded by the scribe was done at my direction and has been verified and validated by me.     Patrick Campbell  04/15/21 9590       Adrian Hoyos MD  04/15/21 8435

## 2021-04-16 ENCOUNTER — TELEPHONE (OUTPATIENT)
Dept: CARDIAC SURGERY | Facility: CLINIC | Age: 66
End: 2021-04-16

## 2021-04-16 LAB
BH CV ECHO MEAS - BSA(HAYCOCK): 2.5 M^2
BH CV ECHO MEAS - BSA: 2.4 M^2
BH CV ECHO MEAS - BZI_BMI: 32.7 KILOGRAMS/M^2
BH CV ECHO MEAS - BZI_METRIC_HEIGHT: 188 CM
BH CV ECHO MEAS - BZI_METRIC_WEIGHT: 115.7 KG
BH CV LEA LEFT CFA DISTAL PSV: 89 CM/S
BH CV LEA LEFT CFA PROX PSV: 93 CM/S
BH CV LEA LEFT DFA PROX PSV: 42 CM/S
BH CV LEA LEFT POPITEAL A  PROX PSV: 35 CM/S
BH CV LEA LEFT PTA DISTAL PSV: 25 CM/S
BH CV LEA LEFT SFA DISTAL PSV: 99 CM/S
BH CV LEA LEFT SFA MID PSV: 102 CM/S
BH CV LEA LEFT SFA PROX PSV: 101 CM/S
DIST PTA PSV LEFT: 25.1 CM/SEC
DIST SFA PSV LEFT: -99.9 CM/SEC
LEFT CFA PROX SYS PSV: 89.6 CM/SEC
MID SFA PSV LEFT: 102.7 CM/SEC
PROX POP A PSV LEFT: 34.9 CM/SEC
PROX SFA PSV LEFT: 102 CM/SEC

## 2021-04-16 RX ORDER — LOSARTAN POTASSIUM 25 MG/1
25 TABLET ORAL DAILY
Qty: 30 TABLET | Refills: 3 | Status: SHIPPED | OUTPATIENT
Start: 2021-04-16 | End: 2021-05-19 | Stop reason: SDUPTHER

## 2021-04-16 NOTE — TELEPHONE ENCOUNTER
"4/16/21 Spoke with pt. regarding f/u appt moved up after ER visit 4/15/21.  Mr. Bryant had ultrasound of his groin at [facility]. Yesterday 4/15/21.  Ultrasound was abnormal and reading physician Dr. Morton suggested patient go to ER for evaluation & referral back to surgeon.  Pt. Stated he was in ER for over seven hours and wanted to know how an \"elective\" test became an \"emergent\" visit regarding his groin problem.      Patient is scheduled to see DORIS Grigsby with Dr. Guerrier 4/19/21 to evaluate and discuss if procedure is needed.-RDC  "

## 2021-04-16 NOTE — TELEPHONE ENCOUNTER
Will discontinue lisinopril and begin losartan 25 mg daily due to side effects of dry cough. Rx sent to patient's pharmacy.

## 2021-04-16 NOTE — TELEPHONE ENCOUNTER
Called and advised patient to discontinue tasking his lisinopril due to side effects and to start taking Losartan 25mg daily which has been sent to the pharmacy. Patient verbalized understanding and had no further questions or concerns at this time.

## 2021-04-19 ENCOUNTER — OFFICE VISIT (OUTPATIENT)
Dept: CARDIAC SURGERY | Facility: CLINIC | Age: 66
End: 2021-04-19

## 2021-04-19 ENCOUNTER — DOCUMENTATION (OUTPATIENT)
Dept: CARDIOLOGY | Facility: HOSPITAL | Age: 66
End: 2021-04-19

## 2021-04-19 VITALS
SYSTOLIC BLOOD PRESSURE: 150 MMHG | BODY MASS INDEX: 33.75 KG/M2 | WEIGHT: 263 LBS | OXYGEN SATURATION: 98 % | HEART RATE: 78 BPM | DIASTOLIC BLOOD PRESSURE: 100 MMHG | TEMPERATURE: 96.9 F | HEIGHT: 74 IN

## 2021-04-19 DIAGNOSIS — Q23.1 BICUSPID AORTIC VALVE: ICD-10-CM

## 2021-04-19 DIAGNOSIS — I71.20 THORACIC AORTIC ANEURYSM WITHOUT RUPTURE (HCC): Primary | ICD-10-CM

## 2021-04-19 PROCEDURE — 99024 POSTOP FOLLOW-UP VISIT: CPT | Performed by: NURSE PRACTITIONER

## 2021-04-19 NOTE — PROGRESS NOTES
[facility] Cardiothoracic Surgery Office Follow Up Note     Date of Encounter: 04/19/2021     MRN Number: [id number]  Name: Yeison Bryant  Phone Number: [phone]     Referred By: No ref. provider found  PCP: Kreis, Samuel Duane, MD    Chief Complaint:    Chief Complaint   Patient presents with   • Post-op Follow-up     Hx AVR 3/3-FU to Eval Left Groin Seroma   • Cardiac Valve Problem       History of Present Illness:    Yeison Bryant is a 65 y.o. male with a history of former tobacco use, cardiomyopathy preop EF 15 to 20%, A. fib/ascending thoracic aortic aneurysm and bicuspid aortic valve s/p aortic valve replacement with bioprosthetic tissue valve, replacement repair of ascending aortic aneurysm with Dacron graft, Maze procedure and left femoral arterial cutdown on 3/3/2021 with Dr. Guerrier.  Presents today in follow-up of left groin.  Last seen in the office on 4/8/2021 in postoperative follow-up.  He was doing well.  However, patient had enlargement of his left groin and painful.  On physical examination, patient's groin was pulsatile and tender.  Subsequent arterial duplex was ordered to rule out left groin pseudoaneurysm.  This was performed on 4/15/2021 and noted large fluid collection, 9 x 10 cm and appeared to be consistent with seroma, noted anterior to the left femoral artery, and acute hematoma could not be excluded.  He was subsequently sent to the emergency room and discussed with Dr. Hollis/Dr. Guerrier and CTA AA with runoff was performed which showed no hematoma with 10.7 x 6.8 cm large fluid collection consistent with seroma.  He presents today in follow-up.  He reports that he has been resting through the weekend and continues to have left groin pain that he describes as sharp.  He has been avoiding 90 degree angles.  He reports the size of his left groin is unchanged and continues to be bothersome.  He denies any erythema, fevers or chills.    Review of Systems:  Review of  Systems   Constitutional: Positive for decreased appetite, malaise/fatigue and weight loss. Negative for chills, fever and night sweats.   HENT: Negative for hearing loss, odynophagia and sore throat.    Cardiovascular: Negative for chest pain, dyspnea on exertion, leg swelling, orthopnea and palpitations.   Respiratory: Negative for cough and hemoptysis.    Endocrine: Negative for cold intolerance, heat intolerance, polydipsia, polyphagia and polyuria.   Hematologic/Lymphatic: Does not bruise/bleed easily.   Skin: Negative for itching and rash.   Musculoskeletal: Negative for joint pain, joint swelling and myalgias.   Gastrointestinal: Negative for abdominal pain, constipation, diarrhea, hematemesis, hematochezia, melena, nausea and vomiting.   Genitourinary: Negative for dysuria, frequency and hematuria.   Neurological: Negative for focal weakness, headaches, numbness and seizures.   Psychiatric/Behavioral: Negative for suicidal ideas. The patient has insomnia.    All other systems reviewed and are negative.      I have reviewed the review of systems as entered by my clinical support staff and have updated it as appropriate.       Allergies:  No Known Allergies    Medications:      Current Outpatient Medications:   •  apixaban (ELIQUIS) 5 MG tablet tablet, Take 1 tablet by mouth Every 12 (Twelve) Hours. Indications: Atrial Fibrillation (Patient taking differently: Take 5 mg by mouth Every 12 (Twelve) Hours. Indications: Atrial Fibrillation, Atrial Fibrillation), Disp: 60 tablet, Rfl: 11  •  aspirin 81 MG EC tablet, Take 1 tablet by mouth Daily., Disp: 100 tablet, Rfl: 4  •  atorvastatin (LIPITOR) 40 MG tablet, , Disp: , Rfl:   •  carvedilol (Coreg) 25 MG tablet, Take 1 tablet by mouth 2 (Two) Times a Day With Meals., Disp: 60 tablet, Rfl: 5  •  fluticasone (FLONASE) 50 MCG/ACT nasal spray, 2 sprays into the nostril(s) as directed by provider Daily., Disp: , Rfl:   •  losartan (Cozaar) 25 MG tablet, Take 1 tablet  by mouth Daily., Disp: 30 tablet, Rfl: 3  •  multivitamin with minerals (MULTIVITAMIN ADULTS PO), Take 1 tablet by mouth Daily., Disp: , Rfl:   •  colchicine 0.6 MG tablet, Take 0.6 mg by mouth Every Other Day., Disp: , Rfl:   •  guaiFENesin (MUCINEX) 600 MG 12 hr tablet, Take 600 mg by mouth Daily. As needed, Disp: , Rfl:     Social History     Socioeconomic History   • Marital status:      Spouse name: Not on file   • Number of children: 2   • Years of education: Not on file   • Highest education level: Not on file   Tobacco Use   • Smoking status: Former Smoker     Packs/day: 2.00     Years: 36.00     Pack years: 72.00     Types: Cigarettes     Quit date: 2008     Years since quittin.2   • Smokeless tobacco: Never Used   Vaping Use   • Vaping Use: Never used   Substance and Sexual Activity   • Alcohol use: Yes     Comment: OCCASIONAL ALCOHOL    • Drug use: Never   • Sexual activity: Defer       Family History   Adopted: Yes   Family history unknown: Yes       Past Medical History:   Diagnosis Date   • Arrhythmia    • Arthritis    • Atrial fibrillation (CMS/[facility])    • Cataract    • CHF (congestive heart failure) (CMS/HCC) 2021   • COVID-19 2020   • COVID-19 vaccine administered 2021   • Dyslipidemia    • Former smoker    • History of gout    • History of pneumonia    • Hypertension    • Obesity (BMI 30-39.9)    • SOB (shortness of breath)        Past Surgical History:   Procedure Laterality Date   • AORTIC VALVE REPAIR/REPLACEMENT N/A 3/3/2021    Procedure: MEDIAN STERNOTOMY, MAZE, AORTIC VALVE REPLACEMENT WITH LEFT FEMORAL ARTERIAL CANNULATION;  Surgeon: Evgeny Guerrier MD;  Location:  [facility] OR;  Service: Cardiothoracic;  Laterality: N/A;   • AORTIC VALVE SURGERY     • CARDIAC CATHETERIZATION Left 2021    Procedure: Left Heart Cath - COVID+ ;  Surgeon: Mahendra Waldron MD;  Location:  [facility] CATH INVASIVE [facility]ATION;  Service: Cardiology;  Laterality: Left;   •  "CARDIAC CATHETERIZATION N/A 1/25/2021    Procedure: Aortic root aortogram;  Surgeon: Mahendra Waldron MD;  Location:  [facility]ATH INVASIVE LOCATION;  Service: Cardiology;  Laterality: N/A;   • CATARACT EXTRACTION     • COLONOSCOPY     • EYE SURGERY      lasic and cataract   • KNEE SURGERY Bilateral    • THORACIC AORTIC ANEURYSM REPAIR N/A 3/3/2021    Procedure: ASCENDING THORACIC AORTIC ANEURYSM REPAIR, DIONISIO PER ANESTHESIA;  Surgeon: Evgeny Guerrier MD;  Location:  [facility] OR;  Service: Cardiothoracic;  Laterality: N/A;   • TONSILLECTOMY         Physical Exam:  Vital Signs:    Vitals:    04/19/21 0733   BP: 150/100   BP Location: Left arm   Patient Position: Sitting   Pulse: 78   Temp: 96.9 °F (36.1 °C)   SpO2: 98%   Weight: 119 kg (263 lb)   Height: 188 cm (74\")     Body mass index is 33.77 kg/m².     Physical Exam  Cardiovascular:      Pulses:           Dorsalis pedis pulses are 2+ on the left side.        Posterior tibial pulses are 2+ on the left side.   Skin:     Comments: Left groin:  Tender fluid filled collection, approximately 9 cm x 9 cm.  No surrounding erythema, drainage, induration or warmth         Labs/Imaging:    XR Chest 2 View    Result Date: 4/9/2021  Postoperative changes as above without evidence of acute disease.  This report was finalized on 4/9/2021 12:10 PM by Bruno Milian.      CT Angio Abdominal Aorta Bilateral Iliofem Runoff    Result Date: 4/15/2021  1. There is a large fluid collection in the patient's left inguinal region. There does not appear to be definite active extravasation or definite pseudoaneurysm. This is thought to most likely represent a seroma. However, subtle vascular injury cannot be entirely excluded. If the patient's symptoms persist or worsen, consider follow-up with formal angiogram. This case was discussed with Dr. Hoyos at time of dictation. 2. There is severe narrowing with potential distal occlusion involving the patient's left anterior and posterior " tibial arteries. Signer Name: Suki Hunt MD  Signed: 4/15/2021 7:52 PM  Workstation Name: [id number]  [facility]       Assessment / Plan:  Diagnoses and all orders for this visit:    1. Acending thoracic aortic aneurysm (Repair/replace w/ woven Dacron graft size 30 mm 3/3/21) (Primary)    2. Bicuspid aortic valve (S/P 27 trifecta bioprosthetic tissue valve 3/3/21)     Yeison Bryant is a 65 y.o. male with a history of former tobacco use, cardiomyopathy preop EF 15 to 20%, A. fib/ascending thoracic aortic aneurysm and bicuspid aortic valve s/p aortic valve replacement with bioprosthetic tissue valve, replacement repair of ascending aortic aneurysm with Dacron graft, Maze procedure and left femoral arterial cutdown on 3/3/2021 with Dr. Guerrier.  Postoperative left groin seroma.  Seen by Dr. Guerrier and offered bedside drainage versus continued monitoring.  Patient was interested in proceeding with drainage s/p bedside needle drainage by Dr. Guerrier in office removing 140 mL of clear yellow drainage consistent with seroma.  Educated patient that he will most likely have drainage from the puncture sites over the next several days and to cover with a dry 4 x 4 and change daily/as needed.  Dr. Guerrier discussed that seroma could reaccumulate and may require a second office drainage as well as potentially having to proceed to the operating room with incision and drainage.  Patient verbailizes understanding.  He is to contact our office with any worsening erythema, drainage, fevers, chills or if he has reaccumulation requiring a second procedure.    Yeison Bryant  reports that he quit smoking about 13 years ago. His smoking use included cigarettes. He has a 72.00 pack-year smoking history. He has never used smokeless tobacco.     Calista Valente, DORIS  [facility] Cardiothoracic Surgery    Please note that portions of this note may have been completed with a voice recognition program.  Efforts were made to edit the dictations, but occasionally words are mistranscribed.

## 2021-04-19 NOTE — PROGRESS NOTES
Patient in mcot. Frequent episodes of atrial fib noted on 4/15/2021 with no afib noted 4/16-4/19. Continue wearing mcot.  Patient is anticoagulated with eliquis and denies any s/s of bleeding.

## 2021-04-22 ENCOUNTER — DOCUMENTATION (OUTPATIENT)
Dept: CARDIAC REHAB | Facility: HOSPITAL | Age: 66
End: 2021-04-22

## 2021-04-22 NOTE — PROGRESS NOTES
Due to the Covid 19 pandemic [facility]  is open but we are social distancing that means space is limited  We will do our best to get  the patients that want to participate into the program scheduled. Patients are being notified of the the restrictions and  being placed on a waiting list at this time and if they are interested staff will schedule patients as soon as possible.      Spoke with patient to let him know we will be scheduling for the first of June for the next class and he stated he would like to get started and ask if there was anywhere else. I told him the closet would probably be [facility] and he said he would contact them.

## 2021-04-29 ENCOUNTER — TREATMENT (OUTPATIENT)
Dept: CARDIAC REHAB | Facility: HOSPITAL | Age: 66
End: 2021-04-29

## 2021-04-29 DIAGNOSIS — Z95.3 S/P AORTIC VALVE REPLACEMENT WITH BIOPROSTHETIC VALVE: Primary | ICD-10-CM

## 2021-04-29 PROCEDURE — 93797 PHYS/QHP OP CAR RHAB WO ECG: CPT

## 2021-04-29 PROCEDURE — 93798 PHYS/QHP OP CAR RHAB W/ECG: CPT

## 2021-04-29 NOTE — PROGRESS NOTES
Pt was seen today in CR for a Phase 2 visit.  Vital signs and session notes recorded in [employer] and will be scanned into Epic by HIM.

## 2021-05-03 ENCOUNTER — OFFICE VISIT (OUTPATIENT)
Dept: CARDIAC SURGERY | Facility: CLINIC | Age: 66
End: 2021-05-03

## 2021-05-03 VITALS
TEMPERATURE: 98.6 F | HEART RATE: 72 BPM | HEIGHT: 74 IN | SYSTOLIC BLOOD PRESSURE: 120 MMHG | DIASTOLIC BLOOD PRESSURE: 72 MMHG | OXYGEN SATURATION: 98 % | WEIGHT: 262.2 LBS | BODY MASS INDEX: 33.65 KG/M2

## 2021-05-03 DIAGNOSIS — I97.648 POSTOPERATIVE SEROMA INVOLVING CIRCULATORY SYSTEM AFTER OTHER CIRCULATORY SYSTEM PROCEDURE: Primary | ICD-10-CM

## 2021-05-03 PROBLEM — T81.9XXA COMPLICATION OF SURGICAL PROCEDURE: Status: ACTIVE | Noted: 2021-05-03

## 2021-05-03 PROBLEM — E78.5 HLD (HYPERLIPIDEMIA): Status: ACTIVE | Noted: 2021-05-03

## 2021-05-03 PROBLEM — E66.9 OBESITY (BMI 30-39.9): Status: ACTIVE | Noted: 2021-05-03

## 2021-05-03 PROBLEM — I10 HTN (HYPERTENSION): Status: ACTIVE | Noted: 2021-05-03

## 2021-05-03 PROCEDURE — 99024 POSTOP FOLLOW-UP VISIT: CPT | Performed by: NURSE PRACTITIONER

## 2021-05-03 NOTE — PROGRESS NOTES
[facility] Cardiothoracic Surgery Office Follow Up Note     Date of Encounter: 05/03/2021     MRN Number: [id number]  Name: Yeison Bryant  Phone Number: [phone]     Referred By: No ref. provider found  PCP: Jerry Garcia PA    Chief Complaint:    Chief Complaint   Patient presents with   • Follow-up     Follow up per ROM to eval left groin seroma, AVR/MAZE 3/3/21. Pt states that he is doing better. Denies any SOB or fatigue.        History of Present Illness:    Yeison Bryant is a 65 y.o. male former smoker with history of atrial fibrillation, CHF (EF less than 25%), bicuspid aortic valve and ascending aortic aneurysm who is s/p aortic valve replacement with bioprosthetic tissue valve, repair of asending aortic aneurysm, maze procedure, and left femoral cutdown on 3/3/2021 with Dr. Guerrier. Postoperatively he experienced atrial fibrillation and right side pneumo requiring pigtail catheter placement on POD 3.  He was discharged on POD 6. Last seen in clinic on 4/8 with APRN and did have left groin hematoma which was tender to deep palpation with thrill.  Ultrasound ordered to rule out pseudoaneurysm. Arterial ultrasound showed large fluid collection 9 x 10 cm and was instructed to go to the ED for CTA.  CTA consistent with 10.7 x 6.8 cm large fluid collection consistent with seroma.  At his last office visit on 4/19 he had bedside drainage of previously mentioned seroma by Dr. Guerrier and 140 mls of clear yellow drainage was removed.  He returns to clinic today for reevaluation of left groin.  He reports it has reaccumulated to original size but is nontender, has had no drainage, or warmth.  He has had no fever, chills, body aches.    Of note patient has started his CP rehab and has follow-up with cardiology Dr. Waldron on 6/15.    Review of Systems:  Review of Systems   Constitutional: Positive for weight loss (lost 15 to 20 pounds in the last two months). Negative for chills, decreased  appetite, diaphoresis, fever, malaise/fatigue, night sweats and weight gain.   HENT: Negative for hoarse voice.    Eyes: Negative for blurred vision, double vision and visual disturbance.   Cardiovascular: Positive for chest pain (tenderness) and irregular heartbeat. Negative for claudication, dyspnea on exertion, leg swelling, near-syncope, orthopnea, palpitations, paroxysmal nocturnal dyspnea and syncope.   Respiratory: Negative for cough, hemoptysis, shortness of breath, sputum production and wheezing.    Hematologic/Lymphatic: Negative for adenopathy and bleeding problem. Bruises/bleeds easily.   Skin: Positive for poor wound healing. Negative for color change, nail changes and rash.   Musculoskeletal: Positive for back pain (lower back). Negative for falls and muscle cramps.   Gastrointestinal: Negative for abdominal pain, dysphagia and heartburn.   Genitourinary: Negative for flank pain.   Neurological: Positive for numbness (right hand pinky and ring fingers). Negative for brief paralysis, disturbances in coordination, dizziness, focal weakness, headaches, light-headedness, loss of balance, paresthesias, sensory change, vertigo and weakness.   Psychiatric/Behavioral: Negative for depression and suicidal ideas. The patient has insomnia.    Allergic/Immunologic: Positive for environmental allergies. Negative for persistent infections.       I have reviewed the following portions of the patient's history: allergies, current medications, past family history, past medical history, past social history, past surgical history and problem list and confirm it's accurate.    Allergies:  No Known Allergies    Medications:      Current Outpatient Medications:   •  apixaban (ELIQUIS) 5 MG tablet tablet, Take 1 tablet by mouth Every 12 (Twelve) Hours. Indications: Atrial Fibrillation (Patient taking differently: Take 5 mg by mouth Every 12 (Twelve) Hours. Indications: Atrial Fibrillation, Atrial Fibrillation), Disp: 60  tablet, Rfl: 11  •  aspirin 81 MG EC tablet, Take 1 tablet by mouth Daily., Disp: 100 tablet, Rfl: 4  •  atorvastatin (LIPITOR) 40 MG tablet, , Disp: , Rfl:   •  carvedilol (Coreg) 25 MG tablet, Take 1 tablet by mouth 2 (Two) Times a Day With Meals., Disp: 60 tablet, Rfl: 5  •  colchicine 0.6 MG tablet, Take 0.6 mg by mouth Every Other Day., Disp: , Rfl:   •  fluticasone (FLONASE) 50 MCG/ACT nasal spray, 2 sprays into the nostril(s) as directed by provider Daily., Disp: , Rfl:   •  guaiFENesin (MUCINEX) 600 MG 12 hr tablet, Take 600 mg by mouth Daily. As needed, Disp: , Rfl:   •  losartan (Cozaar) 25 MG tablet, Take 1 tablet by mouth Daily., Disp: 30 tablet, Rfl: 3  •  multivitamin with minerals (MULTIVITAMIN ADULTS PO), Take 1 tablet by mouth Daily., Disp: , Rfl:     History:   Past Medical History:   Diagnosis Date   • Arrhythmia    • Arthritis    • Atrial fibrillation (CMS/HCC)    • Cataract    • CHF (congestive heart failure) (CMS/HCC) 01/18/2021   • COVID-19 12/2020   • COVID-19 vaccine administered 03/11/2021   • Dyslipidemia    • Former smoker    • History of gout 2011   • History of pneumonia 1974   • Hypertension    • Obesity (BMI 30-39.9)    • SOB (shortness of breath)        Past Surgical History:   Procedure Laterality Date   • AORTIC VALVE REPAIR/REPLACEMENT N/A 3/3/2021    Procedure: MEDIAN STERNOTOMY, MAZE, AORTIC VALVE REPLACEMENT WITH LEFT FEMORAL ARTERIAL CANNULATION;  Surgeon: Evgeny Guerrier MD;  Location: [facility] OR;  Service: Cardiothoracic;  Laterality: N/A;   • AORTIC VALVE SURGERY     • CARDIAC CATHETERIZATION Left 1/25/2021    Procedure: Left Heart Cath - COVID+ 12/27;  Surgeon: Mahendra Waldron MD;  Location:  [facility] INVASIVE LOC[facility];  Service: Cardiology;  Laterality: Left;   • CARDIAC CATHETERIZATION N/A 1/25/2021    Procedure: Aortic root aortogram;  Surgeon: Mahendra Waldron MD;  Location:  [facility] INVASIVE LOC[facility];  Service: Cardiology;  Laterality: N/A;   • CATARACT  "EXTRACTION     • COLONOSCOPY     • EYE SURGERY      lasic and cataract   • KNEE SURGERY Bilateral    • THORACIC AORTIC ANEURYSM REPAIR N/A 3/3/2021    Procedure: ASCENDING THORACIC AORTIC ANEURYSM REPAIR, DIONISIO PER ANESTHESIA;  Surgeon: Evgeny Guerrier MD;  Location: [facility];  Service: Cardiothoracic;  Laterality: N/A;   • TONSILLECTOMY          Family History   Adopted: Yes   Family history unknown: Yes       Social History     Socioeconomic History   • Marital status:      Spouse name: Not on file   • Number of children: 2   • Years of education: Not on file   • Highest education level: Not on file   Tobacco Use   • Smoking status: Former Smoker     Packs/day: 2.00     Years: 36.00     Pack years: 72.00     Types: Cigarettes     Quit date: 2008     Years since quittin.2   • Smokeless tobacco: Never Used   Vaping Use   • Vaping Use: Never used   Substance and Sexual Activity   • Alcohol use: Yes     Comment: OCCASIONAL ALCOHOL    • Drug use: Never   • Sexual activity: Defer       Physical Exam:  Vitals:    21 0736   BP: 120/72   Pulse: 72   Temp: 98.6 °F (37 °C)   SpO2: 98%   Weight: 119 kg (262 lb 3.2 oz)   Height: 188 cm (74\")      Body mass index is 33.66 kg/m².    Physical Exam  Vitals and nursing note reviewed.   Constitutional:       Appearance: Normal appearance. He is obese.   HENT:      Head: Normocephalic and atraumatic.   Eyes:      Pupils: Pupils are equal, round, and reactive to light.   Neck:      Vascular: No carotid bruit.   Cardiovascular:      Rate and Rhythm: Normal rate and regular rhythm.      Pulses: Normal pulses.      Heart sounds: Normal heart sounds, S1 normal and S2 normal. No murmur heard.     Pulmonary:      Effort: Pulmonary effort is normal.      Breath sounds: Normal breath sounds.   Abdominal:      Palpations: Abdomen is soft.   Musculoskeletal:         General: Normal range of motion.      Cervical back: Normal range of motion and neck supple.      Right " lower leg: No edema.      Left lower leg: No edema.   Skin:     General: Skin is warm and dry.      Capillary Refill: Capillary refill takes less than 2 seconds.      Comments: Mid-sternotomy incision: healed wound edges well approximated, no erythema, edema, or induration  Mediastinal tubes sites: healed  Left groin incision: wound edges approximated. no without erythema, opening, or drainage. Large seroma present, nontender   Neurological:      General: No focal deficit present.      Mental Status: He is alert and oriented to person, place, and time. Mental status is at baseline.      GCS: GCS eye subscore is 4. GCS verbal subscore is 5. GCS motor subscore is 6.      Motor: Motor function is intact.      Coordination: Coordination is intact.      Gait: Gait is intact.   Psychiatric:         Mood and Affect: Mood normal.         Speech: Speech normal.         Behavior: Behavior normal. Behavior is cooperative.         Cognition and Memory: Cognition normal.         Labs/Imaging:  XR Chest 2 View    Result Date: 4/9/2021  Postoperative changes as above without evidence of acute disease.  This report was finalized on 4/9/2021 12:10 PM by Bruno Milian.      CT Angio Abdominal Aorta Bilateral Iliofem Runoff    Result Date: 4/15/2021  1. There is a large fluid collection in the patient's left inguinal region. There does not appear to be definite active extravasation or definite pseudoaneurysm. This is thought to most likely represent a seroma. However, subtle vascular injury cannot be entirely excluded. If the patient's symptoms persist or worsen, consider follow-up with formal angiogram. This case was discussed with Dr. Hoyos at time of dictation. 2. There is severe narrowing with potential distal occlusion involving the patient's left anterior and posterior tibial arteries. Signer Name: Suki Hunt MD  Signed: 4/15/2021 7:52 PM  Workstation Name: [id number]  [facility]  / Plan:  Diagnoses and all orders for this visit:    1. Postoperative seroma involving circulatory system after other circulatory system procedure (Primary)     Yeison Bryant is a 65 y.o. male former smoker with history of atrial fibrillation, CHF (EF less than 25%), bicuspid aortic valve and ascending aortic aneurysm who is s/p aortic valve replacement with bioprosthetic tissue valve, repair of asending aortic aneurysm, maze procedure, and left femoral cutdown on 3/3/2021 with Dr. Guerrier who has experienced reaccumulation of postoperative left groin seroma.  During his last office visit on 4/19 he had 140 mls of clear yellow drainage evacuated.  He reports soon after going home fluid reaccumulated.  His seroma is nontender, no erythema or warmth and he has denies constitutional symptoms.  He is compliant on his Eliquis therapy for his A. fib.  Dr. Guerrier at bedside to discuss interventional options including repeat bedside drain versus drainage in OR.  Secondary to concerns of recurrence patient agreed to move forward with drainage in the OR. Risks of surgery were discussed with the patient including: bleeding, infection, blood clots, kidney damage, stroke, heart attack, or death.  Patient understands risks and agrees to proceed.      Follow Up:   No follow-ups on file.   RTC for any further concerns or worsening sign and symptoms. If unable to reach us in the office please dial 911 or go to the nearest emergency department.      Alicia GEORGE  [facility] Cardiothoracic Surgery

## 2021-05-04 ENCOUNTER — TREATMENT (OUTPATIENT)
Dept: CARDIAC REHAB | Facility: HOSPITAL | Age: 66
End: 2021-05-04

## 2021-05-04 DIAGNOSIS — Z95.3 S/P AORTIC VALVE REPLACEMENT WITH BIOPROSTHETIC VALVE: Primary | ICD-10-CM

## 2021-05-04 PROCEDURE — 93798 PHYS/QHP OP CAR RHAB W/ECG: CPT

## 2021-05-04 NOTE — PROGRESS NOTES
Pt was seen today in CR for a Phase 2 visit.  Vital signs and session notes recorded in [employer] and will be scanned into Epic by HIM.

## 2021-05-05 ENCOUNTER — HOSPITAL ENCOUNTER (OUTPATIENT)
Dept: GENERAL RADIOLOGY | Facility: HOSPITAL | Age: 66
Discharge: HOME | End: 2021-05-05

## 2021-05-05 ENCOUNTER — TREATMENT (OUTPATIENT)
Dept: CARDIAC REHAB | Facility: HOSPITAL | Age: 66
End: 2021-05-05

## 2021-05-05 ENCOUNTER — ANESTHESIA EVENT (OUTPATIENT)
Dept: PERIOP | Facility: HOSPITAL | Age: 66
End: 2021-05-05

## 2021-05-05 ENCOUNTER — PRE-ADMISSION TESTING (OUTPATIENT)
Dept: PREADMISSION TESTING | Facility: HOSPITAL | Age: 66
End: 2021-05-05

## 2021-05-05 ENCOUNTER — PREP FOR SURGERY (OUTPATIENT)
Dept: OTHER | Facility: HOSPITAL | Age: 66
End: 2021-05-05

## 2021-05-05 VITALS — HEIGHT: 74 IN | WEIGHT: 263.45 LBS | BODY MASS INDEX: 33.81 KG/M2

## 2021-05-05 DIAGNOSIS — I97.648 POSTOPERATIVE SEROMA INVOLVING CIRCULATORY SYSTEM AFTER OTHER CIRCULATORY SYSTEM PROCEDURE: Primary | ICD-10-CM

## 2021-05-05 DIAGNOSIS — Z95.3 S/P AORTIC VALVE REPLACEMENT WITH BIOPROSTHETIC VALVE: Primary | ICD-10-CM

## 2021-05-05 DIAGNOSIS — I97.648 POSTOPERATIVE SEROMA INVOLVING CIRCULATORY SYSTEM AFTER OTHER CIRCULATORY SYSTEM PROCEDURE: ICD-10-CM

## 2021-05-05 LAB
ANION GAP SERPL CALCULATED.3IONS-SCNC: 9 MMOL/L (ref 5–15)
APTT PPP: 33.2 SECONDS (ref 22–39)
BUN SERPL-MCNC: 21 MG/DL (ref 8–23)
BUN/CREAT SERPL: 24.1 (ref 7–25)
CALCIUM SPEC-SCNC: 9.5 MG/DL (ref 8.6–10.5)
CHLORIDE SERPL-SCNC: 105 MMOL/L (ref 98–107)
CO2 SERPL-SCNC: 27 MMOL/L (ref 22–29)
CREAT SERPL-MCNC: 0.87 MG/DL (ref 0.76–1.27)
DEPRECATED RDW RBC AUTO: 46 FL (ref 37–54)
ERYTHROCYTE [DISTWIDTH] IN BLOOD BY AUTOMATED COUNT: 13.7 % (ref 12.3–15.4)
GFR SERPL CREATININE-BSD FRML MDRD: 88 ML/MIN/1.73
GLUCOSE SERPL-MCNC: 123 MG/DL (ref 65–99)
HBA1C MFR BLD: 5.5 % (ref 4.8–5.6)
HCT VFR BLD AUTO: 40.8 % (ref 37.5–51)
HGB BLD-MCNC: 12.6 G/DL (ref 13–17.7)
INR PPP: 1.22 (ref 0.85–1.16)
MCH RBC QN AUTO: 28.2 PG (ref 26.6–33)
MCHC RBC AUTO-ENTMCNC: 30.9 G/DL (ref 31.5–35.7)
MCV RBC AUTO: 91.3 FL (ref 79–97)
PLATELET # BLD AUTO: 197 10*3/MM3 (ref 140–450)
PMV BLD AUTO: 9 FL (ref 6–12)
POTASSIUM SERPL-SCNC: 4.6 MMOL/L (ref 3.5–5.2)
PROTHROMBIN TIME: 15 SECONDS (ref 11.4–14.4)
QT INTERVAL: 418 MS
QTC INTERVAL: 444 MS
RBC # BLD AUTO: 4.47 10*6/MM3 (ref 4.14–5.8)
SODIUM SERPL-SCNC: 141 MMOL/L (ref 136–145)
WBC # BLD AUTO: 7.8 10*3/MM3 (ref 3.4–10.8)

## 2021-05-05 PROCEDURE — 85027 COMPLETE CBC AUTOMATED: CPT

## 2021-05-05 PROCEDURE — 93798 PHYS/QHP OP CAR RHAB W/ECG: CPT

## 2021-05-05 PROCEDURE — 36415 COLL VENOUS BLD VENIPUNCTURE: CPT

## 2021-05-05 PROCEDURE — 93010 ELECTROCARDIOGRAM REPORT: CPT | Performed by: INTERNAL MEDICINE

## 2021-05-05 PROCEDURE — 85610 PROTHROMBIN TIME: CPT

## 2021-05-05 PROCEDURE — 80048 BASIC METABOLIC PNL TOTAL CA: CPT

## 2021-05-05 PROCEDURE — 83036 HEMOGLOBIN GLYCOSYLATED A1C: CPT

## 2021-05-05 PROCEDURE — 85730 THROMBOPLASTIN TIME PARTIAL: CPT

## 2021-05-05 PROCEDURE — 93005 ELECTROCARDIOGRAM TRACING: CPT

## 2021-05-05 PROCEDURE — 71046 X-RAY EXAM CHEST 2 VIEWS: CPT

## 2021-05-05 RX ORDER — CHLORHEXIDINE GLUCONATE 500 MG/1
1 CLOTH TOPICAL EVERY 12 HOURS PRN
Status: CANCELLED | OUTPATIENT
Start: 2021-05-05

## 2021-05-05 NOTE — PROGRESS NOTES
Pt was seen today in CR for a Phase 2 visit.  Vital signs and session notes recorded in [employer] and will be scanned into Epic by HIM.

## 2021-05-06 ENCOUNTER — TREATMENT (OUTPATIENT)
Dept: CARDIAC REHAB | Facility: HOSPITAL | Age: 66
End: 2021-05-06

## 2021-05-06 ENCOUNTER — TELEPHONE (OUTPATIENT)
Dept: CARDIOLOGY | Facility: CLINIC | Age: 66
End: 2021-05-06

## 2021-05-06 DIAGNOSIS — Z95.3 S/P AORTIC VALVE REPLACEMENT WITH BIOPROSTHETIC VALVE: Primary | ICD-10-CM

## 2021-05-06 PROCEDURE — 93798 PHYS/QHP OP CAR RHAB W/ECG: CPT

## 2021-05-06 NOTE — PROGRESS NOTES
Pt was seen today in CR for a Phase 2 visit.  Vital signs and session notes recorded in [employer] and will be scanned into Epic by HIM.

## 2021-05-07 ENCOUNTER — APPOINTMENT (OUTPATIENT)
Dept: PREADMISSION TESTING | Facility: HOSPITAL | Age: 66
End: 2021-05-07

## 2021-05-09 ENCOUNTER — APPOINTMENT (OUTPATIENT)
Dept: PREADMISSION TESTING | Facility: HOSPITAL | Age: 66
End: 2021-05-09

## 2021-05-09 PROCEDURE — U0004 COV-19 TEST NON-CDC HGH THRU: HCPCS

## 2021-05-09 PROCEDURE — C9803 HOPD COVID-19 SPEC COLLECT: HCPCS

## 2021-05-10 LAB — SARS-COV-2 RNA NOSE QL NAA+PROBE: NOT DETECTED

## 2021-05-11 ENCOUNTER — APPOINTMENT (OUTPATIENT)
Dept: CARDIAC REHAB | Facility: HOSPITAL | Age: 66
End: 2021-05-11

## 2021-05-11 ENCOUNTER — HOSPITAL ENCOUNTER (OUTPATIENT)
Facility: HOSPITAL | Age: 66
Discharge: HOME | End: 2021-05-12
Attending: THORACIC SURGERY (CARDIOTHORACIC VASCULAR SURGERY) | Admitting: THORACIC SURGERY (CARDIOTHORACIC VASCULAR SURGERY)

## 2021-05-11 ENCOUNTER — ANESTHESIA (OUTPATIENT)
Dept: PERIOP | Facility: HOSPITAL | Age: 66
End: 2021-05-11

## 2021-05-11 DIAGNOSIS — I97.648 POSTOPERATIVE SEROMA INVOLVING CIRCULATORY SYSTEM AFTER OTHER CIRCULATORY SYSTEM PROCEDURE: ICD-10-CM

## 2021-05-11 LAB
ABO GROUP BLD: NORMAL
BLD GP AB SCN SERPL QL: NEGATIVE
RH BLD: POSITIVE
T&S EXPIRATION DATE: NORMAL

## 2021-05-11 PROCEDURE — 86900 BLOOD TYPING SEROLOGIC ABO: CPT | Performed by: THORACIC SURGERY (CARDIOTHORACIC VASCULAR SURGERY)

## 2021-05-11 PROCEDURE — 25010000002 DEXAMETHASONE PER 1 MG: Performed by: NURSE ANESTHETIST, CERTIFIED REGISTERED

## 2021-05-11 PROCEDURE — 25010000002 ONDANSETRON PER 1 MG: Performed by: NURSE ANESTHETIST, CERTIFIED REGISTERED

## 2021-05-11 PROCEDURE — 87015 SPECIMEN INFECT AGNT CONCNTJ: CPT | Performed by: THORACIC SURGERY (CARDIOTHORACIC VASCULAR SURGERY)

## 2021-05-11 PROCEDURE — 13121 CMPLX RPR S/A/L 2.6-7.5 CM: CPT | Performed by: THORACIC SURGERY (CARDIOTHORACIC VASCULAR SURGERY)

## 2021-05-11 PROCEDURE — 87205 SMEAR GRAM STAIN: CPT | Performed by: THORACIC SURGERY (CARDIOTHORACIC VASCULAR SURGERY)

## 2021-05-11 PROCEDURE — 25010000002 FENTANYL CITRATE (PF) 50 MCG/ML SOLUTION: Performed by: NURSE ANESTHETIST, CERTIFIED REGISTERED

## 2021-05-11 PROCEDURE — 87102 FUNGUS ISOLATION CULTURE: CPT | Performed by: THORACIC SURGERY (CARDIOTHORACIC VASCULAR SURGERY)

## 2021-05-11 PROCEDURE — 86850 RBC ANTIBODY SCREEN: CPT | Performed by: THORACIC SURGERY (CARDIOTHORACIC VASCULAR SURGERY)

## 2021-05-11 PROCEDURE — 25010000002 FENTANYL CITRATE (PF) 100 MCG/2ML SOLUTION: Performed by: NURSE ANESTHETIST, CERTIFIED REGISTERED

## 2021-05-11 PROCEDURE — 87116 MYCOBACTERIA CULTURE: CPT | Performed by: THORACIC SURGERY (CARDIOTHORACIC VASCULAR SURGERY)

## 2021-05-11 PROCEDURE — 87176 TISSUE HOMOGENIZATION CULTR: CPT | Performed by: THORACIC SURGERY (CARDIOTHORACIC VASCULAR SURGERY)

## 2021-05-11 PROCEDURE — 87075 CULTR BACTERIA EXCEPT BLOOD: CPT | Performed by: THORACIC SURGERY (CARDIOTHORACIC VASCULAR SURGERY)

## 2021-05-11 PROCEDURE — G0378 HOSPITAL OBSERVATION PER HR: HCPCS

## 2021-05-11 PROCEDURE — 25010000002 CEFUROXIME: Performed by: NURSE PRACTITIONER

## 2021-05-11 PROCEDURE — 25010000003 CEFUROXIME SODIUM 1.5 G RECONSTITUTED SOLUTION: Performed by: PHYSICIAN ASSISTANT

## 2021-05-11 PROCEDURE — 87206 SMEAR FLUORESCENT/ACID STAI: CPT | Performed by: THORACIC SURGERY (CARDIOTHORACIC VASCULAR SURGERY)

## 2021-05-11 PROCEDURE — 87070 CULTURE OTHR SPECIMN AEROBIC: CPT | Performed by: THORACIC SURGERY (CARDIOTHORACIC VASCULAR SURGERY)

## 2021-05-11 PROCEDURE — 86901 BLOOD TYPING SEROLOGIC RH(D): CPT | Performed by: THORACIC SURGERY (CARDIOTHORACIC VASCULAR SURGERY)

## 2021-05-11 PROCEDURE — 25010000002 NEOSTIGMINE 10 MG/10ML SOLUTION: Performed by: NURSE ANESTHETIST, CERTIFIED REGISTERED

## 2021-05-11 PROCEDURE — 86923 COMPATIBILITY TEST ELECTRIC: CPT

## 2021-05-11 PROCEDURE — 25010000003 LIDOCAINE 1 % SOLUTION: Performed by: ANESTHESIOLOGY

## 2021-05-11 RX ORDER — ASPIRIN 81 MG/1
81 TABLET ORAL DAILY
Status: DISCONTINUED | OUTPATIENT
Start: 2021-05-12 | End: 2021-05-12 | Stop reason: HOSPADM

## 2021-05-11 RX ORDER — SODIUM CHLORIDE, SODIUM LACTATE, POTASSIUM CHLORIDE, CALCIUM CHLORIDE 600; 310; 30; 20 MG/100ML; MG/100ML; MG/100ML; MG/100ML
1000 INJECTION, SOLUTION INTRAVENOUS CONTINUOUS
Status: DISCONTINUED | OUTPATIENT
Start: 2021-05-11 | End: 2021-05-12 | Stop reason: HOSPADM

## 2021-05-11 RX ORDER — DROPERIDOL 2.5 MG/ML
0.62 INJECTION, SOLUTION INTRAMUSCULAR; INTRAVENOUS ONCE AS NEEDED
Status: DISCONTINUED | OUTPATIENT
Start: 2021-05-11 | End: 2021-05-11 | Stop reason: HOSPADM

## 2021-05-11 RX ORDER — LABETALOL HYDROCHLORIDE 5 MG/ML
5 INJECTION, SOLUTION INTRAVENOUS
Status: DISCONTINUED | OUTPATIENT
Start: 2021-05-11 | End: 2021-05-11 | Stop reason: HOSPADM

## 2021-05-11 RX ORDER — CARVEDILOL 12.5 MG/1
25 TABLET ORAL 2 TIMES DAILY WITH MEALS
Status: DISCONTINUED | OUTPATIENT
Start: 2021-05-11 | End: 2021-05-12 | Stop reason: HOSPADM

## 2021-05-11 RX ORDER — MORPHINE SULFATE 2 MG/ML
2 INJECTION, SOLUTION INTRAMUSCULAR; INTRAVENOUS EVERY 4 HOURS PRN
Status: DISCONTINUED | OUTPATIENT
Start: 2021-05-11 | End: 2021-05-12 | Stop reason: HOSPADM

## 2021-05-11 RX ORDER — HYDROCODONE BITARTRATE AND ACETAMINOPHEN 7.5; 325 MG/1; MG/1
1 TABLET ORAL EVERY 6 HOURS PRN
Status: DISCONTINUED | OUTPATIENT
Start: 2021-05-11 | End: 2021-05-12 | Stop reason: HOSPADM

## 2021-05-11 RX ORDER — HYDROMORPHONE HYDROCHLORIDE 1 MG/ML
0.5 INJECTION, SOLUTION INTRAMUSCULAR; INTRAVENOUS; SUBCUTANEOUS
Status: DISCONTINUED | OUTPATIENT
Start: 2021-05-11 | End: 2021-05-11 | Stop reason: HOSPADM

## 2021-05-11 RX ORDER — DEXAMETHASONE SODIUM PHOSPHATE 4 MG/ML
INJECTION, SOLUTION INTRA-ARTICULAR; INTRALESIONAL; INTRAMUSCULAR; INTRAVENOUS; SOFT TISSUE AS NEEDED
Status: DISCONTINUED | OUTPATIENT
Start: 2021-05-11 | End: 2021-05-11 | Stop reason: SURG

## 2021-05-11 RX ORDER — FAMOTIDINE 20 MG/1
20 TABLET, FILM COATED ORAL
Status: COMPLETED | OUTPATIENT
Start: 2021-05-11 | End: 2021-05-11

## 2021-05-11 RX ORDER — FENTANYL CITRATE 50 UG/ML
50 INJECTION, SOLUTION INTRAMUSCULAR; INTRAVENOUS
Status: DISCONTINUED | OUTPATIENT
Start: 2021-05-11 | End: 2021-05-11 | Stop reason: HOSPADM

## 2021-05-11 RX ORDER — ETOMIDATE 2 MG/ML
INJECTION INTRAVENOUS AS NEEDED
Status: DISCONTINUED | OUTPATIENT
Start: 2021-05-11 | End: 2021-05-11 | Stop reason: SURG

## 2021-05-11 RX ORDER — LIDOCAINE HYDROCHLORIDE 10 MG/ML
INJECTION, SOLUTION INFILTRATION; PERINEURAL AS NEEDED
Status: DISCONTINUED | OUTPATIENT
Start: 2021-05-11 | End: 2021-05-11 | Stop reason: SURG

## 2021-05-11 RX ORDER — SODIUM CHLORIDE, SODIUM LACTATE, POTASSIUM CHLORIDE, CALCIUM CHLORIDE 600; 310; 30; 20 MG/100ML; MG/100ML; MG/100ML; MG/100ML
INJECTION, SOLUTION INTRAVENOUS CONTINUOUS PRN
Status: DISCONTINUED | OUTPATIENT
Start: 2021-05-11 | End: 2021-05-11

## 2021-05-11 RX ORDER — ACETAMINOPHEN 325 MG/1
650 TABLET ORAL EVERY 6 HOURS PRN
Status: DISCONTINUED | OUTPATIENT
Start: 2021-05-11 | End: 2021-05-12 | Stop reason: HOSPADM

## 2021-05-11 RX ORDER — ROCURONIUM BROMIDE 10 MG/ML
INJECTION, SOLUTION INTRAVENOUS AS NEEDED
Status: DISCONTINUED | OUTPATIENT
Start: 2021-05-11 | End: 2021-05-11 | Stop reason: SURG

## 2021-05-11 RX ORDER — LOSARTAN POTASSIUM 25 MG/1
25 TABLET ORAL DAILY
Status: DISCONTINUED | OUTPATIENT
Start: 2021-05-12 | End: 2021-05-12 | Stop reason: HOSPADM

## 2021-05-11 RX ORDER — LIDOCAINE HYDROCHLORIDE 10 MG/ML
0.5 INJECTION, SOLUTION EPIDURAL; INFILTRATION; INTRACAUDAL; PERINEURAL ONCE AS NEEDED
Status: COMPLETED | OUTPATIENT
Start: 2021-05-11 | End: 2021-05-11

## 2021-05-11 RX ORDER — AMOXICILLIN 250 MG
2 CAPSULE ORAL 2 TIMES DAILY PRN
Status: DISCONTINUED | OUTPATIENT
Start: 2021-05-11 | End: 2021-05-12 | Stop reason: HOSPADM

## 2021-05-11 RX ORDER — ESMOLOL HYDROCHLORIDE 10 MG/ML
INJECTION INTRAVENOUS AS NEEDED
Status: DISCONTINUED | OUTPATIENT
Start: 2021-05-11 | End: 2021-05-11 | Stop reason: SURG

## 2021-05-11 RX ORDER — FENTANYL CITRATE 50 UG/ML
INJECTION, SOLUTION INTRAMUSCULAR; INTRAVENOUS AS NEEDED
Status: DISCONTINUED | OUTPATIENT
Start: 2021-05-11 | End: 2021-05-11 | Stop reason: SURG

## 2021-05-11 RX ORDER — SODIUM CHLORIDE 0.9 % (FLUSH) 0.9 %
10 SYRINGE (ML) INJECTION AS NEEDED
Status: DISCONTINUED | OUTPATIENT
Start: 2021-05-11 | End: 2021-05-11 | Stop reason: HOSPADM

## 2021-05-11 RX ORDER — ONDANSETRON 4 MG/1
4 TABLET, FILM COATED ORAL EVERY 6 HOURS PRN
Status: DISCONTINUED | OUTPATIENT
Start: 2021-05-11 | End: 2021-05-12 | Stop reason: HOSPADM

## 2021-05-11 RX ORDER — NEOSTIGMINE METHYLSULFATE 1 MG/ML
INJECTION, SOLUTION INTRAVENOUS AS NEEDED
Status: DISCONTINUED | OUTPATIENT
Start: 2021-05-11 | End: 2021-05-11 | Stop reason: SURG

## 2021-05-11 RX ORDER — CHLORHEXIDINE GLUCONATE 500 MG/1
1 CLOTH TOPICAL EVERY 12 HOURS PRN
Status: DISCONTINUED | OUTPATIENT
Start: 2021-05-11 | End: 2021-05-11 | Stop reason: HOSPADM

## 2021-05-11 RX ORDER — SODIUM CHLORIDE 9 MG/ML
INJECTION, SOLUTION INTRAVENOUS AS NEEDED
Status: DISCONTINUED | OUTPATIENT
Start: 2021-05-11 | End: 2021-05-11 | Stop reason: HOSPADM

## 2021-05-11 RX ORDER — ONDANSETRON 2 MG/ML
INJECTION INTRAMUSCULAR; INTRAVENOUS AS NEEDED
Status: DISCONTINUED | OUTPATIENT
Start: 2021-05-11 | End: 2021-05-11 | Stop reason: SURG

## 2021-05-11 RX ORDER — FLUTICASONE PROPIONATE 50 MCG
2 SPRAY, SUSPENSION (ML) NASAL DAILY PRN
Status: DISCONTINUED | OUTPATIENT
Start: 2021-05-11 | End: 2021-05-12 | Stop reason: HOSPADM

## 2021-05-11 RX ORDER — BISACODYL 10 MG
10 SUPPOSITORY, RECTAL RECTAL DAILY PRN
Status: DISCONTINUED | OUTPATIENT
Start: 2021-05-11 | End: 2021-05-12 | Stop reason: HOSPADM

## 2021-05-11 RX ORDER — MULTIPLE VITAMINS W/ MINERALS TAB 9MG-400MCG
1 TAB ORAL DAILY
Status: DISCONTINUED | OUTPATIENT
Start: 2021-05-12 | End: 2021-05-12 | Stop reason: HOSPADM

## 2021-05-11 RX ORDER — SODIUM CHLORIDE 450 MG/100ML
35 INJECTION, SOLUTION INTRAVENOUS CONTINUOUS
Status: DISCONTINUED | OUTPATIENT
Start: 2021-05-11 | End: 2021-05-12 | Stop reason: HOSPADM

## 2021-05-11 RX ORDER — ONDANSETRON 2 MG/ML
4 INJECTION INTRAMUSCULAR; INTRAVENOUS EVERY 6 HOURS PRN
Status: DISCONTINUED | OUTPATIENT
Start: 2021-05-11 | End: 2021-05-12 | Stop reason: HOSPADM

## 2021-05-11 RX ORDER — COLCHICINE 0.6 MG/1
0.6 TABLET ORAL DAILY PRN
Status: DISCONTINUED | OUTPATIENT
Start: 2021-05-11 | End: 2021-05-12 | Stop reason: HOSPADM

## 2021-05-11 RX ORDER — ATORVASTATIN CALCIUM 40 MG/1
40 TABLET, FILM COATED ORAL NIGHTLY
Status: DISCONTINUED | OUTPATIENT
Start: 2021-05-11 | End: 2021-05-12 | Stop reason: HOSPADM

## 2021-05-11 RX ORDER — GLYCOPYRROLATE 0.2 MG/ML
INJECTION INTRAMUSCULAR; INTRAVENOUS AS NEEDED
Status: DISCONTINUED | OUTPATIENT
Start: 2021-05-11 | End: 2021-05-11 | Stop reason: SURG

## 2021-05-11 RX ADMIN — SODIUM CHLORIDE, POTASSIUM CHLORIDE, SODIUM LACTATE AND CALCIUM CHLORIDE 1000 ML: 600; 310; 30; 20 INJECTION, SOLUTION INTRAVENOUS at 09:36

## 2021-05-11 RX ADMIN — LIDOCAINE HYDROCHLORIDE 50 MG: 10 INJECTION, SOLUTION INFILTRATION; PERINEURAL at 12:39

## 2021-05-11 RX ADMIN — ONDANSETRON 4 MG: 2 INJECTION INTRAMUSCULAR; INTRAVENOUS at 13:18

## 2021-05-11 RX ADMIN — FENTANYL CITRATE 100 MCG: 50 INJECTION, SOLUTION INTRAMUSCULAR; INTRAVENOUS at 12:47

## 2021-05-11 RX ADMIN — FENTANYL CITRATE 50 MCG: 50 INJECTION, SOLUTION INTRAMUSCULAR; INTRAVENOUS at 13:50

## 2021-05-11 RX ADMIN — GLYCOPYRROLATE 0.4 MG: 0.4 INJECTION INTRAMUSCULAR; INTRAVENOUS at 13:22

## 2021-05-11 RX ADMIN — CEFUROXIME SODIUM 1.5 G: 1.5 INJECTION, POWDER, FOR SOLUTION INTRAVENOUS at 20:44

## 2021-05-11 RX ADMIN — ATORVASTATIN CALCIUM 40 MG: 40 TABLET, FILM COATED ORAL at 20:44

## 2021-05-11 RX ADMIN — MUPIROCIN 1 APPLICATION: 20 OINTMENT TOPICAL at 09:34

## 2021-05-11 RX ADMIN — ESMOLOL HYDROCHLORIDE 30 MG: 10 INJECTION, SOLUTION INTRAVENOUS at 12:48

## 2021-05-11 RX ADMIN — FAMOTIDINE 20 MG: 20 TABLET, FILM COATED ORAL at 09:36

## 2021-05-11 RX ADMIN — ETOMIDATE 26 MG: 2 INJECTION, SOLUTION INTRAVENOUS at 12:39

## 2021-05-11 RX ADMIN — SODIUM CHLORIDE 35 ML/HR: 4.5 INJECTION, SOLUTION INTRAVENOUS at 18:21

## 2021-05-11 RX ADMIN — NEOSTIGMINE 3 MG: 1 INJECTION INTRAVENOUS at 13:22

## 2021-05-11 RX ADMIN — CEFUROXIME 1.5 G: 1.5 INJECTION, POWDER, FOR SOLUTION INTRAVENOUS at 12:45

## 2021-05-11 RX ADMIN — LIDOCAINE HYDROCHLORIDE 0.5 ML: 10 INJECTION, SOLUTION EPIDURAL; INFILTRATION; INTRACAUDAL; PERINEURAL at 09:36

## 2021-05-11 RX ADMIN — ESMOLOL HYDROCHLORIDE 10 MG: 10 INJECTION, SOLUTION INTRAVENOUS at 12:47

## 2021-05-11 RX ADMIN — CARVEDILOL 25 MG: 12.5 TABLET, FILM COATED ORAL at 18:20

## 2021-05-11 RX ADMIN — ROCURONIUM BROMIDE 50 MG: 10 INJECTION INTRAVENOUS at 12:39

## 2021-05-11 RX ADMIN — ACETAMINOPHEN 650 MG: 325 TABLET ORAL at 21:04

## 2021-05-11 RX ADMIN — DEXAMETHASONE SODIUM PHOSPHATE 8 MG: 4 INJECTION, SOLUTION INTRA-ARTICULAR; INTRALESIONAL; INTRAMUSCULAR; INTRAVENOUS; SOFT TISSUE at 13:06

## 2021-05-11 NOTE — ANESTHESIA POSTPROCEDURE EVALUATION
Patient: Yeison Bryant    Procedure Summary     Date: 05/11/21 Room / Location:  [facility] OR [address] [place] [address] [facility]    Anesthesia Start: 1235 Anesthesia Stop:     Procedure: drainage of left seroma and capsulectomy (Left Thigh) Diagnosis:       Postoperative seroma involving circulatory system after other circulatory system procedure      (Postoperative seroma involving circulatory system after other circulatory system procedure [I97.648])    Surgeons: Evgeny Guerrier MD Provider: Patrick Joel MD    Anesthesia Type: general ASA Status: 4          Anesthesia Type: general    Vitals  No vitals data found for the desired time range.    97.8  135/88  68  12  98%      Post Anesthesia Care and Evaluation    Patient location during evaluation: PACU  Patient participation: complete - patient cannot participate  Pain management: adequate  Airway patency: patent  Anesthetic complications: No anesthetic complications  PONV Status: none  Cardiovascular status: acceptable  Respiratory status: acceptable  Hydration status: acceptable

## 2021-05-11 NOTE — ANESTHESIA PROCEDURE NOTES
Airway  Urgency: elective    Date/Time: 5/11/2021 12:44 PM  Airway not difficult    General Information and Staff    Patient location during procedure: OR  Anesthesiologist: Jean Carlso Rizvi Jr., MD    Indications and Patient Condition  Indications for airway management: airway protection    Preoxygenated: yes  MILS maintained throughout  Mask difficulty assessment: 2 - vent by mask + OA or adjuvant +/- NMBA    Final Airway Details  Final airway type: endotracheal airway      Successful airway: ETT  Cuffed: yes   Successful intubation technique: direct laryngoscopy  Blade: Matt  Blade size: 3  ETT size (mm): 7.5  Cormack-Lehane Classification: grade I - full view of glottis  Cuff volume (mL): 6  Measured from: lips  ETT/EBT  to lips (cm): 24  Number of attempts at approach: 1  Assessment: lips, teeth, and gum same as pre-op and atraumatic intubation    Additional Comments  OETT placed without difficulty, ventilation with assist, equal breath sounds and symmetric chest rise and fall

## 2021-05-11 NOTE — ANESTHESIA PREPROCEDURE EVALUATION
Anesthesia Evaluation     Patient summary reviewed and Nursing notes reviewed   no history of anesthetic complications:  NPO Solid Status: > 8 hours  NPO Liquid Status: > 8 hours           Airway   Mallampati: I  TM distance: >3 FB  Neck ROM: full  No difficulty expected  Comment: Previous grade 1 with MAC 3 blade  Dental - normal exam     Pulmonary     breath sounds clear to auscultation  (+) a smoker Former,   Cardiovascular     ECG reviewed  Rhythm: regular  Rate: normal    (+) hypertension, valvular problems/murmurs (s/p AVR and ascending thoracic aneursym repair) AI, AS and MR, dysrhythmias Atrial Fib, CHF Systolic <55%, hyperlipidemia,   (-) CAD, cardiac stents    ROS comment: EF 25%    Neuro/Psych- negative ROS  GI/Hepatic/Renal/Endo    (+) obesity,     (-) GERD, liver disease, no renal disease, diabetes, no thyroid disorder    Musculoskeletal     Abdominal    Substance History      OB/GYN          Other   arthritis,                      Anesthesia Plan    ASA 4     general     intravenous induction     Anesthetic plan, all risks, benefits, and alternatives have been provided, discussed and informed consent has been obtained with: patient.    Plan discussed with CRNA.

## 2021-05-12 ENCOUNTER — APPOINTMENT (OUTPATIENT)
Dept: CARDIAC REHAB | Facility: HOSPITAL | Age: 66
End: 2021-05-12

## 2021-05-12 VITALS
SYSTOLIC BLOOD PRESSURE: 131 MMHG | TEMPERATURE: 98.1 F | RESPIRATION RATE: 18 BRPM | HEART RATE: 72 BPM | OXYGEN SATURATION: 97 % | DIASTOLIC BLOOD PRESSURE: 89 MMHG

## 2021-05-12 LAB
ANION GAP SERPL CALCULATED.3IONS-SCNC: 8 MMOL/L (ref 5–15)
BASOPHILS # BLD AUTO: 0.01 10*3/MM3 (ref 0–0.2)
BASOPHILS NFR BLD AUTO: 0.1 % (ref 0–1.5)
BUN SERPL-MCNC: 15 MG/DL (ref 8–23)
BUN/CREAT SERPL: 19.2 (ref 7–25)
CALCIUM SPEC-SCNC: 9 MG/DL (ref 8.6–10.5)
CHLORIDE SERPL-SCNC: 105 MMOL/L (ref 98–107)
CO2 SERPL-SCNC: 27 MMOL/L (ref 22–29)
CREAT SERPL-MCNC: 0.78 MG/DL (ref 0.76–1.27)
DEPRECATED RDW RBC AUTO: 46.8 FL (ref 37–54)
EOSINOPHIL # BLD AUTO: 0 10*3/MM3 (ref 0–0.4)
EOSINOPHIL NFR BLD AUTO: 0 % (ref 0.3–6.2)
ERYTHROCYTE [DISTWIDTH] IN BLOOD BY AUTOMATED COUNT: 13.9 % (ref 12.3–15.4)
GFR SERPL CREATININE-BSD FRML MDRD: 100 ML/MIN/1.73
GLUCOSE SERPL-MCNC: 166 MG/DL (ref 65–99)
HCT VFR BLD AUTO: 40.5 % (ref 37.5–51)
HGB BLD-MCNC: 12.4 G/DL (ref 13–17.7)
IMM GRANULOCYTES # BLD AUTO: 0.05 10*3/MM3 (ref 0–0.05)
IMM GRANULOCYTES NFR BLD AUTO: 0.6 % (ref 0–0.5)
LYMPHOCYTES # BLD AUTO: 1.14 10*3/MM3 (ref 0.7–3.1)
LYMPHOCYTES NFR BLD AUTO: 12.9 % (ref 19.6–45.3)
MCH RBC QN AUTO: 28 PG (ref 26.6–33)
MCHC RBC AUTO-ENTMCNC: 30.6 G/DL (ref 31.5–35.7)
MCV RBC AUTO: 91.4 FL (ref 79–97)
MONOCYTES # BLD AUTO: 0.35 10*3/MM3 (ref 0.1–0.9)
MONOCYTES NFR BLD AUTO: 4 % (ref 5–12)
NEUTROPHILS NFR BLD AUTO: 7.29 10*3/MM3 (ref 1.7–7)
NEUTROPHILS NFR BLD AUTO: 82.4 % (ref 42.7–76)
NRBC BLD AUTO-RTO: 0 /100 WBC (ref 0–0.2)
PLAT MORPH BLD: NORMAL
PLATELET # BLD AUTO: 186 10*3/MM3 (ref 140–450)
PMV BLD AUTO: 9.2 FL (ref 6–12)
POTASSIUM SERPL-SCNC: 4.8 MMOL/L (ref 3.5–5.2)
RBC # BLD AUTO: 4.43 10*6/MM3 (ref 4.14–5.8)
RBC MORPH BLD: NORMAL
SODIUM SERPL-SCNC: 140 MMOL/L (ref 136–145)
WBC # BLD AUTO: 8.84 10*3/MM3 (ref 3.4–10.8)
WBC MORPH BLD: NORMAL

## 2021-05-12 PROCEDURE — 99024 POSTOP FOLLOW-UP VISIT: CPT | Performed by: THORACIC SURGERY (CARDIOTHORACIC VASCULAR SURGERY)

## 2021-05-12 PROCEDURE — 85025 COMPLETE CBC W/AUTO DIFF WBC: CPT | Performed by: PHYSICIAN ASSISTANT

## 2021-05-12 PROCEDURE — 25010000003 CEFUROXIME SODIUM 1.5 G RECONSTITUTED SOLUTION: Performed by: PHYSICIAN ASSISTANT

## 2021-05-12 PROCEDURE — G0378 HOSPITAL OBSERVATION PER HR: HCPCS

## 2021-05-12 PROCEDURE — 85007 BL SMEAR W/DIFF WBC COUNT: CPT | Performed by: PHYSICIAN ASSISTANT

## 2021-05-12 PROCEDURE — 80048 BASIC METABOLIC PNL TOTAL CA: CPT | Performed by: PHYSICIAN ASSISTANT

## 2021-05-12 RX ORDER — ACETAMINOPHEN 325 MG/1
650 TABLET ORAL EVERY 6 HOURS PRN
Qty: 30 TABLET | Refills: 0 | Status: SHIPPED | OUTPATIENT
Start: 2021-05-12

## 2021-05-12 RX ADMIN — ASPIRIN 81 MG: 81 TABLET, COATED ORAL at 08:59

## 2021-05-12 RX ADMIN — LOSARTAN POTASSIUM 25 MG: 25 TABLET, FILM COATED ORAL at 08:59

## 2021-05-12 RX ADMIN — Medication 1 TABLET: at 08:59

## 2021-05-12 RX ADMIN — CEFUROXIME SODIUM 1.5 G: 1.5 INJECTION, POWDER, FOR SOLUTION INTRAVENOUS at 04:13

## 2021-05-12 RX ADMIN — CARVEDILOL 25 MG: 12.5 TABLET, FILM COATED ORAL at 08:59

## 2021-05-13 ENCOUNTER — APPOINTMENT (OUTPATIENT)
Dept: CARDIAC REHAB | Facility: HOSPITAL | Age: 66
End: 2021-05-13

## 2021-05-13 LAB
BH BB BLOOD EXPIRATION DATE: NORMAL
BH BB BLOOD EXPIRATION DATE: NORMAL
BH BB BLOOD TYPE BARCODE: 6200
BH BB BLOOD TYPE BARCODE: 6200
BH BB DISPENSE STATUS: NORMAL
BH BB DISPENSE STATUS: NORMAL
BH BB PRODUCT CODE: NORMAL
BH BB PRODUCT CODE: NORMAL
BH BB UNIT NUMBER: NORMAL
BH BB UNIT NUMBER: NORMAL
CROSSMATCH INTERPRETATION: NORMAL
CROSSMATCH INTERPRETATION: NORMAL
UNIT  ABO: NORMAL
UNIT  ABO: NORMAL
UNIT  RH: NORMAL
UNIT  RH: NORMAL

## 2021-05-14 LAB
BACTERIA SPEC AEROBE CULT: NORMAL
GRAM STN SPEC: NORMAL

## 2021-05-16 LAB — BACTERIA SPEC ANAEROBE CULT: NORMAL

## 2021-05-18 ENCOUNTER — APPOINTMENT (OUTPATIENT)
Dept: CARDIAC REHAB | Facility: HOSPITAL | Age: 66
End: 2021-05-18

## 2021-05-19 ENCOUNTER — OFFICE VISIT (OUTPATIENT)
Dept: CARDIOLOGY | Facility: HOSPITAL | Age: 66
End: 2021-05-19

## 2021-05-19 ENCOUNTER — APPOINTMENT (OUTPATIENT)
Dept: CARDIAC REHAB | Facility: HOSPITAL | Age: 66
End: 2021-05-19

## 2021-05-19 VITALS
HEIGHT: 74 IN | SYSTOLIC BLOOD PRESSURE: 124 MMHG | OXYGEN SATURATION: 99 % | BODY MASS INDEX: 33.17 KG/M2 | HEART RATE: 70 BPM | WEIGHT: 258.5 LBS | RESPIRATION RATE: 18 BRPM | DIASTOLIC BLOOD PRESSURE: 72 MMHG | TEMPERATURE: 97.8 F

## 2021-05-19 DIAGNOSIS — I71.20 THORACIC AORTIC ANEURYSM WITHOUT RUPTURE (HCC): ICD-10-CM

## 2021-05-19 DIAGNOSIS — I97.648 POSTOPERATIVE SEROMA INVOLVING CIRCULATORY SYSTEM AFTER OTHER CIRCULATORY SYSTEM PROCEDURE: ICD-10-CM

## 2021-05-19 DIAGNOSIS — I48.0 PAF (PAROXYSMAL ATRIAL FIBRILLATION) (HCC): ICD-10-CM

## 2021-05-19 DIAGNOSIS — R00.2 PALPITATIONS: Primary | ICD-10-CM

## 2021-05-19 DIAGNOSIS — I50.22 CHRONIC SYSTOLIC HEART FAILURE (HCC): ICD-10-CM

## 2021-05-19 PROCEDURE — 99214 OFFICE O/P EST MOD 30 MIN: CPT | Performed by: NURSE PRACTITIONER

## 2021-05-19 RX ORDER — ASPIRIN 81 MG/1
81 TABLET ORAL DAILY
Qty: 90 TABLET | Refills: 3
Start: 2021-05-19 | End: 2022-03-15 | Stop reason: SDUPTHER

## 2021-05-19 RX ORDER — ATORVASTATIN CALCIUM 40 MG/1
40 TABLET, FILM COATED ORAL DAILY
Qty: 90 TABLET | Refills: 3 | Status: SHIPPED | OUTPATIENT
Start: 2021-05-19 | End: 2022-03-15

## 2021-05-19 RX ORDER — LOSARTAN POTASSIUM 25 MG/1
25 TABLET ORAL DAILY
Qty: 90 TABLET | Refills: 3 | Status: SHIPPED | OUTPATIENT
Start: 2021-05-19 | End: 2022-03-15 | Stop reason: SDUPTHER

## 2021-05-19 RX ORDER — CARVEDILOL 25 MG/1
25 TABLET ORAL 2 TIMES DAILY WITH MEALS
Qty: 180 TABLET | Refills: 3 | Status: SHIPPED | OUTPATIENT
Start: 2021-05-19 | End: 2021-09-14 | Stop reason: SDUPTHER

## 2021-05-19 NOTE — PROGRESS NOTES
"Chief Complaint  Congestive Heart Failure and Follow-up    Subjective    History of Present Illness {CC  Problem List  Visit  Diagnosis   Encounters  Notes  Medications  Labs  Result Review Imaging  Media :23}     Yeison Bryant presents to [facility] CARDIOLOGY for follow-up regarding drainage of left femoral seroma and capsulectomy on 5/11/21.  Since surgery he has been doing overall well; swelling is decreased with a small hardened area at the surgical site.    Currently denies palpitations, dyspnea, pain at surgical site, and dizziness. OT data still processing, and not available  at today's visit. Notes that he would like to resume exercising with his .           Objective     Vital Signs:   Vitals:    05/19/21 1015   BP: 124/72   BP Location: Left arm   Patient Position: Sitting   Cuff Size: Adult   Pulse: 70   Resp: 18   Temp: 97.8 °F (36.6 °C)   TempSrc: Temporal   SpO2: 99%   Weight: 117 kg (258 lb 8 oz)   Height: 188 cm (74\")     Body mass index is 33.19 kg/m².  Physical Exam  Vitals and nursing note reviewed.   Constitutional:       Appearance: Normal appearance.   HENT:      Head: Normocephalic.   Eyes:      Pupils: Pupils are equal, round, and reactive to light.   Cardiovascular:      Rate and Rhythm: Normal rate and regular rhythm. Occasional extrasystoles are present.     Pulses: Normal pulses.      Heart sounds: Normal heart sounds. No murmur heard.     Pulmonary:      Effort: Pulmonary effort is normal.      Breath sounds: Normal breath sounds.   Abdominal:      General: Bowel sounds are normal.      Palpations: Abdomen is soft.   Musculoskeletal:         General: Normal range of motion.      Cervical back: Normal range of motion.      Right lower leg: No edema.      Left lower leg: No edema.   Skin:     General: Skin is warm and dry.      Capillary Refill: Capillary refill takes less than 2 seconds.      Comments: Left groin with incision, intact, soft with no " hematoma present   Neurological:      Mental Status: He is alert and oriented to person, place, and time.   Psychiatric:         Mood and Affect: Mood normal.         Thought Content: Thought content normal.              Data Reviewed:{ Labs  Result Review  Imaging  Med Tab  Media :23}   CBC & Differential (05/12/2021 05:11)  Basic Metabolic Panel (05/12/2021 05:11)  Adult Transthoracic Echo Limited W/ Cont if Necessary Per Protocol (01/26/2021 14:40)      Assessment and Plan {CC Problem List  Visit Diagnosis  ROS  Review (Popup)  Health Maintenance  Quality  BestPractice  Medications  SmartSets  SnapShot Encounters  Media :23}     1. Palpitations  Pending mcot  Continue eliquis for paf     2. Chronic systolic heart failure (CMS/HCC)  euvolemic  Heart failure education today including signs and symptoms, the role of the heart failure center, daily weights, low sodium diet (less than 1500 mg per day), and daily physical activity. Reviewed HF Zones with patient and family.  Patient to continue current medications as previously ordered.     3. PAF (paroxysmal atrial fibrillation) (CMS/HCC)  On coreg   CHADS-VASc Risk Assessment            3 Total Score    1 CHF    1 Hypertension    1 Age 65-74        Criteria that do not apply:    Age >/= 75    DM    PRIOR STROKE/TIA/THROMBO    Vascular Disease    Sex: Female        Anticoagulated with eliquis and denies any s/s of bleeding     4. Thoracic aortic aneurysm without rupture (CMS/HCC)  S/p repair March 2021    5. Postoperative seroma involving circulatory system after other circulatory system procedure  Stable   Healing well           Follow Up {Instructions Charge Capture  Follow-up Communications :23}   Return if symptoms worsen or fail to improve.    Patient was given instructions and counseling regarding his condition or for health maintenance advice. Please see specific information pulled into the AVS if appropriate.  Patient was instructed to call  the [facility] with any questions, concerns, or worsening symptoms.    *Please note that portions of this note were completed with a voice recognition program. Efforts were made to edit the dictations, but occasionally words are mistranscribed.

## 2021-05-20 ENCOUNTER — APPOINTMENT (OUTPATIENT)
Dept: CARDIAC REHAB | Facility: HOSPITAL | Age: 66
End: 2021-05-20

## 2021-05-25 ENCOUNTER — APPOINTMENT (OUTPATIENT)
Dept: CARDIAC REHAB | Facility: HOSPITAL | Age: 66
End: 2021-05-25

## 2021-05-26 ENCOUNTER — APPOINTMENT (OUTPATIENT)
Dept: CARDIAC REHAB | Facility: HOSPITAL | Age: 66
End: 2021-05-26

## 2021-05-27 ENCOUNTER — APPOINTMENT (OUTPATIENT)
Dept: CARDIAC REHAB | Facility: HOSPITAL | Age: 66
End: 2021-05-27

## 2021-06-01 ENCOUNTER — APPOINTMENT (OUTPATIENT)
Dept: CARDIAC REHAB | Facility: HOSPITAL | Age: 66
End: 2021-06-01

## 2021-06-02 ENCOUNTER — APPOINTMENT (OUTPATIENT)
Dept: CARDIAC REHAB | Facility: HOSPITAL | Age: 66
End: 2021-06-02

## 2021-06-03 ENCOUNTER — APPOINTMENT (OUTPATIENT)
Dept: CARDIAC REHAB | Facility: HOSPITAL | Age: 66
End: 2021-06-03

## 2021-06-03 PROBLEM — I48.19 PERSISTENT ATRIAL FIBRILLATION: Status: ACTIVE | Noted: 2021-06-03

## 2021-06-07 ENCOUNTER — OFFICE VISIT (OUTPATIENT)
Dept: CARDIAC SURGERY | Facility: CLINIC | Age: 66
End: 2021-06-07

## 2021-06-07 VITALS
BODY MASS INDEX: 34.39 KG/M2 | OXYGEN SATURATION: 95 % | SYSTOLIC BLOOD PRESSURE: 120 MMHG | HEART RATE: 70 BPM | HEIGHT: 74 IN | WEIGHT: 268 LBS | DIASTOLIC BLOOD PRESSURE: 80 MMHG | TEMPERATURE: 97.3 F

## 2021-06-07 DIAGNOSIS — I97.648 POSTOPERATIVE SEROMA INVOLVING CIRCULATORY SYSTEM AFTER OTHER CIRCULATORY SYSTEM PROCEDURE: Primary | ICD-10-CM

## 2021-06-07 PROCEDURE — 99024 POSTOP FOLLOW-UP VISIT: CPT | Performed by: NURSE PRACTITIONER

## 2021-06-07 NOTE — PROGRESS NOTES
[facility] Cardiothoracic Surgery Office Follow Up Note     Date of Encounter: 06/07/2021     MRN Number: [id number]  Name: Yeison Bryant  Phone Number: [phone]     Referred By: No ref. provider found  PCP: Jerry Garcia PA    Chief Complaint:    Chief Complaint   Patient presents with   • Post-op Follow-up     Hosp D/C Drainage Left Groin Seroma 5/11/21-Hx of AVR 3/3/21   • Cardiac Valve Problem       Subjective      History of Present Illness:    Yeison Bryant is a 65 y.o. male former smoker with history of PAF, CHF (EF < 25%), bicuspid aortic valve and ascending aortic aneurysm who is s/p AVR with bioprosthetic tissue valve, repair of asending aortic aneurysm with Dacron graft, maze procedure, and left femoral cutdown on 3/3/2021 with Dr. Guerrier. Postoperatively he experienced atrial fibrillation and right side pneumo requiring pigtail catheter placement, and significant left groin seroma with subsequent bedside drainage of 140 mls of clear yellow drainage on 4/19.  On his follow up appointment he had reaccumulated to original size. He underwent I&D of large left groin seroma and capsulectomy on 5/11/21 with Dr Guerrier, sent for micro which came back negative. Drain was removed on POD#1 and he was discharged home with no readmissions. He present to clinic today for follow-up. He has had no reaccumulation of fluid and denies any delayed wound healing or complications from his groin site. He has been seen post-operatively by cardiac NP and has appointment with his cardiologist Dr Waldron on 6/15. He went to  rehab but has not been back as he is currently working with his own person  and going to the gym.     Review of Systems:  Review of Systems   Constitutional: Positive for weight loss (15-20 lbs the last 2 months, purposeful). Negative for chills, decreased appetite, diaphoresis, fever, malaise/fatigue, night sweats and weight gain.   HENT: Negative for hoarse voice.     Eyes: Negative for blurred vision, double vision and visual disturbance.   Cardiovascular: Positive for irregular heartbeat. Negative for chest pain, claudication, dyspnea on exertion, leg swelling, near-syncope, orthopnea, palpitations, paroxysmal nocturnal dyspnea and syncope.   Respiratory: Negative for cough, hemoptysis, shortness of breath, sputum production and wheezing.    Hematologic/Lymphatic: Negative for adenopathy and bleeding problem. Bruises/bleeds easily.   Skin: Positive for poor wound healing. Negative for color change, nail changes and rash.   Musculoskeletal: Positive for back pain (secondary to recent golfing). Negative for falls and muscle cramps.   Gastrointestinal: Negative for abdominal pain, dysphagia and heartburn.   Genitourinary: Negative for flank pain.   Neurological: Negative for brief paralysis, disturbances in coordination, dizziness, focal weakness, headaches, light-headedness, loss of balance, numbness, paresthesias, sensory change, vertigo and weakness.   Psychiatric/Behavioral: Negative for depression and suicidal ideas. The patient has insomnia.    Allergic/Immunologic: Positive for environmental allergies. Negative for persistent infections.       I have reviewed the following portions of the patient's history: allergies, current medications, past family history, past medical history, past social history, past surgical history and problem list and confirm it's accurate.    Allergies:  No Known Allergies    Medications:      Current Outpatient Medications:   •  acetaminophen (TYLENOL) 325 MG tablet, Take 2 tablets by mouth Every 6 (Six) Hours As Needed for Mild Pain ., Disp: 30 tablet, Rfl: 0  •  apixaban (ELIQUIS) 5 MG tablet tablet, Take 1 tablet by mouth Every 12 (Twelve) Hours. Indications: Atrial Fibrillation, Disp: 180 tablet, Rfl: 3  •  aspirin 81 MG EC tablet, Take 1 tablet by mouth Daily., Disp: 90 tablet, Rfl: 3  •  atorvastatin (LIPITOR) 40 MG tablet, Take 1 tablet  by mouth Daily., Disp: 90 tablet, Rfl: 3  •  carvedilol (Coreg) 25 MG tablet, Take 1 tablet by mouth 2 (Two) Times a Day With Meals., Disp: 180 tablet, Rfl: 3  •  colchicine 0.6 MG tablet, Take 0.6 mg by mouth Daily As Needed for Muscle / Joint Pain., Disp: , Rfl:   •  fluticasone (FLONASE) 50 MCG/ACT nasal spray, 2 sprays into the nostril(s) as directed by provider Daily As Needed for Allergies., Disp: , Rfl:   •  losartan (Cozaar) 25 MG tablet, Take 1 tablet by mouth Daily., Disp: 90 tablet, Rfl: 3  •  multivitamin with minerals (MULTIVITAMIN ADULTS PO), Take 1 tablet by mouth Daily., Disp: , Rfl:     History:   Past Medical History:   Diagnosis Date   • Arrhythmia    • Arthritis    • Atrial fibrillation (CMS/[facility])    • Cataract    • CHF (congestive heart failure) (CMS/HCC) 01/18/2021   • COVID-19 12/2020   • COVID-19 vaccine administered 03/11/2021   • Dyslipidemia    • Former smoker    • H/O blood clots     post surgery   • History of gout 2011   • History of pneumonia 1974   • Hypertension    • Obesity (BMI 30-39.9)    • Postoperative seroma involving circulatory system after non-circulatory procedure    • SOB (shortness of breath)        Past Surgical History:   Procedure Laterality Date   • AORTIC VALVE REPAIR/REPLACEMENT N/A 3/3/2021    Procedure: MEDIAN STERNOTOMY, MAZE, AORTIC VALVE REPLACEMENT WITH LEFT FEMORAL ARTERIAL CANNULATION;  Surgeon: Evgeny Guerrier MD;  Location: [facility] OR;  Service: Cardiothoracic;  Laterality: N/A;   • AORTIC VALVE SURGERY     • CARDIAC CATHETERIZATION Left 1/25/2021    Procedure: Left Heart Cath - COVID+ 12/27;  Surgeon: Mahendra Waldron MD;  Location:  [facility]IVE [facility]ATION;  Service: Cardiology;  Laterality: Left;   • CARDIAC CATHETERIZATION N/A 1/25/2021    Procedure: Aortic root aortogram;  Surgeon: Mahendra Waldron MD;  Location:  [facility];  Service: Cardiology;  Laterality: N/A;   • CATARACT EXTRACTION     • COLONOSCOPY     • EYE SURGERY   "    lasic and cataract   • FEMORAL ARTERY CUTDOWN Left 2021    Procedure: drainage of left seroma and capsulectomy;  Surgeon: Evgeny Guerrier MD;  Location: [facility] OR;  Service: Vascular;  Laterality: Left;   • KNEE SURGERY Bilateral    • THORACIC AORTIC ANEURYSM REPAIR N/A 3/3/2021    Procedure: ASCENDING THORACIC AORTIC ANEURYSM REPAIR, DIONISIO PER ANESTHESIA;  Surgeon: Evgeny Guerrier MD;  Location: [facility] OR;  Service: Cardiothoracic;  Laterality: N/A;   • TONSILLECTOMY         Social History     Socioeconomic History   • Marital status:      Spouse name: Not on file   • Number of children: 2   • Years of education: Not on file   • Highest education level: Not on file   Tobacco Use   • Smoking status: Former Smoker     Packs/day: 2.00     Years: 36.00     Pack years: 72.00     Types: Cigarettes     Quit date: 2008     Years since quittin.3   • Smokeless tobacco: Never Used   Vaping Use   • Vaping Use: Never used   Substance and Sexual Activity   • Alcohol use: Yes     Comment: OCCASIONAL ALCOHOL    • Drug use: Never   • Sexual activity: Defer        Family History   Adopted: Yes   Family history unknown: Yes       Objective     Physical Exam:  Vitals:    21 0832   BP: 120/80   BP Location: Right arm   Patient Position: Sitting   Pulse: 70   Temp: 97.3 °F (36.3 °C)   SpO2: 95%   Weight: 122 kg (268 lb)   Height: 188 cm (74\")      Body mass index is 34.41 kg/m².    Physical Exam  Vitals and nursing note reviewed.   Constitutional:       General: He is awake.      Appearance: Normal appearance. He is well-developed. He is obese.   HENT:      Head: Normocephalic and atraumatic.   Eyes:      Pupils: Pupils are equal, round, and reactive to light.   Neck:      Vascular: No carotid bruit.   Cardiovascular:      Rate and Rhythm: Normal rate. Rhythm irregularly irregular.      Pulses: Normal pulses.      Heart sounds: Normal heart sounds, S1 normal and S2 normal. No murmur heard. "     Pulmonary:      Effort: Pulmonary effort is normal.      Breath sounds: Normal breath sounds.   Abdominal:      Palpations: Abdomen is soft.   Musculoskeletal:         General: Normal range of motion.      Cervical back: Neck supple.      Right lower leg: No edema.      Left lower leg: No edema.   Skin:     General: Skin is warm and dry.      Capillary Refill: Capillary refill takes less than 2 seconds.      Findings: No bruising.      Comments: Left femoral sites: well healing, no surrounding erythema, warmth, drainage, hematoma, or induration   Neurological:      General: No focal deficit present.      Mental Status: He is alert and oriented to person, place, and time. Mental status is at baseline.      GCS: GCS eye subscore is 4. GCS verbal subscore is 5. GCS motor subscore is 6.      Sensory: Sensation is intact.      Motor: Motor function is intact.      Coordination: Coordination is intact.      Gait: Gait is intact.   Psychiatric:         Mood and Affect: Mood normal.         Speech: Speech normal.         Behavior: Behavior normal. Behavior is cooperative.         Cognition and Memory: Cognition normal.         Imaging/Labs:    Assessment / Plan      Assessment / Plan:  Diagnoses and all orders for this visit:    1. Postoperative seroma involving circulatory system after other circulatory system procedure (Primary)    Yeison Bryant is a 65 y.o. male former smoker with history of PAF, CHF (EF < 25%), bicuspid aortic valve and ascending aortic aneurysm who is s/p AVR with bioprosthetic tissue valve, repair of asending aortic aneurysm with Dacron graft, maze procedure, and left femoral cutdown on 3/3/2021 with Dr. Guerrier. Postoperatively he experienced atrial fibrillation and right side pneumo requiring pigtail catheter placement, and significant left groin seroma with subsequent bedside drainage of 140 mls of clear yellow drainage on 4/19.  On his follow up appointment he had reaccumulated to original  size. He underwent I&D of large left groin seroma and capsulectomy on 5/11/21 with Dr Guerrier, sent for micro which came back negative. Drain was removed on POD#1 and he was discharged home with no readmissions. He present to clinic today for follow-up. He has had no reaccumulation of fluid and denies any delayed wound healing or complications from his groin site. On exam his groin site is non-tender with no ecchymosis, swelling, erythema, or underlying hematoma or induration. He has been seen post-operatively by cardiac NP and has appointment with his cardiologist Dr Waldron on 6/15. He went to  rehab but has not been back as he is currently working with his own person  and going to the gym. From a surgical stand point he is stable and can follow up with us on an as needed basis.     Follow Up:   Return on as needed basis.   RTC for any further concerns or worsening sign and symptoms. If unable to reach us in the office please dial 911 or go to the nearest emergency department.      Alicia GEORGE  [facility] Cardiothoracic Surgery

## 2021-06-08 ENCOUNTER — APPOINTMENT (OUTPATIENT)
Dept: CARDIAC REHAB | Facility: HOSPITAL | Age: 66
End: 2021-06-08

## 2021-06-09 ENCOUNTER — APPOINTMENT (OUTPATIENT)
Dept: CARDIAC REHAB | Facility: HOSPITAL | Age: 66
End: 2021-06-09

## 2021-06-10 ENCOUNTER — APPOINTMENT (OUTPATIENT)
Dept: CARDIAC REHAB | Facility: HOSPITAL | Age: 66
End: 2021-06-10

## 2021-06-14 PROCEDURE — 93272 ECG/REVIEW INTERPRET ONLY: CPT | Performed by: INTERNAL MEDICINE

## 2021-06-15 ENCOUNTER — OFFICE VISIT (OUTPATIENT)
Dept: CARDIOLOGY | Facility: CLINIC | Age: 66
End: 2021-06-15

## 2021-06-15 ENCOUNTER — HOSPITAL ENCOUNTER (OUTPATIENT)
Dept: CARDIOLOGY | Facility: HOSPITAL | Age: 66
Discharge: HOME | End: 2021-06-15
Admitting: INTERNAL MEDICINE

## 2021-06-15 VITALS — HEIGHT: 74 IN | WEIGHT: 268 LBS | BODY MASS INDEX: 34.39 KG/M2

## 2021-06-15 VITALS
DIASTOLIC BLOOD PRESSURE: 74 MMHG | BODY MASS INDEX: 33.75 KG/M2 | WEIGHT: 263 LBS | HEIGHT: 74 IN | SYSTOLIC BLOOD PRESSURE: 139 MMHG | HEART RATE: 75 BPM | OXYGEN SATURATION: 99 %

## 2021-06-15 DIAGNOSIS — Q23.1 BICUSPID AORTIC VALVE: ICD-10-CM

## 2021-06-15 DIAGNOSIS — I10 ESSENTIAL HYPERTENSION: ICD-10-CM

## 2021-06-15 DIAGNOSIS — I42.8 NICM (NONISCHEMIC CARDIOMYOPATHY) (HCC): ICD-10-CM

## 2021-06-15 DIAGNOSIS — I48.19 ATRIAL FIBRILLATION, PERSISTENT (HCC): Primary | ICD-10-CM

## 2021-06-15 DIAGNOSIS — I48.19 PERSISTENT ATRIAL FIBRILLATION (HCC): ICD-10-CM

## 2021-06-15 PROCEDURE — 99214 OFFICE O/P EST MOD 30 MIN: CPT | Performed by: INTERNAL MEDICINE

## 2021-06-15 PROCEDURE — 93306 TTE W/DOPPLER COMPLETE: CPT | Performed by: INTERNAL MEDICINE

## 2021-06-15 PROCEDURE — 93306 TTE W/DOPPLER COMPLETE: CPT

## 2021-06-15 NOTE — PROGRESS NOTES
OFFICE FOLLOW UP     Date of Encounter:06/15/2021     Name: Yeison Bryant  : 1955  Address: [address] [place] [address]    PCP: Jerry Garcia PA  [address] [place] [address] [address] [place] [address]    Yeison Bryant is a 65 y.o. male.      Chief Complaint: Follow up of VHD, Afib, cardiomyopathy - echo today    Problem List:   1. Ascending aortic aneurysm  a. 5.6cm on CT chest   b. Replacement and repair of AAA with 30mm Dacron graft 3/3/2021, Dr. Guerrier  2. Low flow- low gradient Aortic stenosis  a. Right and LHC 21: severe global hypokinesia and EF 20%, mod-severe PAH with elevated LVEDP; cardiac output low at 4.9L/min, normal coronaries, dilated aortic root   b. DIONISIO 21: EF 21-25%, bicuspid aortic valve with mild AS, moderate functional MR, severe dilation of ascending aorta measuring 5.7cm, RV is mildly dilated   c. Dobutamine stress echo 21: Best diagnosis is low flow, low gradient aortic stenosis.  d. AV Replacement 27 trifecta bioprosthetic tissue valve, 3/3/2021 Dr. Guerrier  e. Echo, 6/15/2021: EF 45%, normal function of bioprosthetic tissue valve (final report pending).   3. Atrial fibrillation   a. Diagnosed 21 at time of hospitalization in [place]  b. CHADsVASC=2  c. Echo 21: EF 56-60%, LA is mildly increased, RVSP 42mmHg, moderate dilation of the aortic root   d. MAZE procedure with ablation of left/right superior and inferior pulmonary veins, 3/3/2021. Unable to clip CHARLOTTE as DIONISIO demonstrated a clot within the appendage.  e. Heart monitor revealed 100% Afib, May 2021 - controlled VR 96%, RVR 3%-asymptomatic and anticoagulated on Eliquis.   4. Covid 19 infection 20  5. History of tobacco abuse, inactive   6. Probable ALESSANDRO   7. Obesity    Allergies:  No Known Allergies    Current Medications:  Current Outpatient Medications   Medication Instructions   • acetaminophen (TYLENOL) 650 mg, Oral, Every 6 Hours PRN   • apixaban (ELIQUIS) 5 mg, Oral, Every 12 Hours  "Scheduled   • aspirin 81 mg, Oral, Daily   • atorvastatin (LIPITOR) 40 mg, Oral, Daily   • carvedilol (COREG) 25 mg, Oral, 2 Times Daily With Meals   • colchicine 0.6 mg, Oral, Daily PRN   • fluticasone (FLONASE) 50 MCG/ACT nasal spray 2 sprays, Nasal, Daily PRN   • losartan (COZAAR) 25 mg, Oral, Daily   • multivitamin with minerals (MULTIVITAMIN ADULTS PO) 1 tablet, Oral, Daily        History of Present Illness:       Yeison Bryant returns for hospital follow up today after aortic valve replacement. He has done well postoperatively and is active at his local gym with a . He has received COVID-19 vaccines. He denies angina or symptoms of heart failure. He brings home blood pressure readings that are mostly 110-130s systolic. He denies palpitations, edema, or presyncope/syncope. He had a seroma that was addressed by Dr. Guerrier in early May and has been back on his Eliquis for over one month. He denies any missed doses of Eliquis.      The following portions of the patient's history were reviewed and updated as appropriate: allergies, current medications and problem list.    ROS: Pertinent positives as listed in the HPI.  All other systems reviewed and negative.    Objective:    Vitals:    06/15/21 1517 06/15/21 1518   BP: 135/77 139/74   BP Location: Right arm Right arm   Patient Position: Sitting Standing   Pulse: 73 75   SpO2: 99%    Weight: 119 kg (263 lb)    Height: 188 cm (74\")      Body mass index is 33.77 kg/m².    Physical Exam:  GENERAL: Alert, cooperative, in no acute distress.   HEENT: Normocephalic, no adenopathy, no jugular venous distention  HEART: No discrete PMI is noted. Irregularly irregular rhythm, normal rate, and no murmurs, gallops, or rubs.   LUNGS: Clear to auscultation bilaterally. No wheezing, rales or ronchi.  ABDOMEN: Soft, bowel sounds present, non-tender   NEUROLOGIC: No focal abnormalities involving strength or sensation are noted.   EXTREMITIES: No clubbing, cyanosis, or " "edema noted.      Diagnostic Data:    Lab Results   Component Value Date    WBC 8.84 05/12/2021    HGB 12.4 (L) 05/12/2021    HCT 40.5 05/12/2021    MCV 91.4 05/12/2021     05/12/2021      Lab Results   Component Value Date    HGBA1C 5.50 05/05/2021      Lab Results   Component Value Date    CHOL 181 01/19/2021    TRIG 124 01/19/2021    HDL 56 01/19/2021     (H) 01/19/2021        Procedures    Assessment and Plan:   1.  VHD, post AVR for low output-low gradient valvular aortic stenosis.  Echocardiogram shows normal functioning bioprosthetic AV and EF has improved to 45% (formal read pending). We will not make any medication changes today. THIS TLS DOES SUGGEST SIGNIFICANT MR; LATER \"RELOOK\" IS SUGGESTED.  2.  HTN:  Normal to slightly above target at home. He will continue to check home blood pressure readings 2-3 times weekly. We will address AHT at time of cardioversion (see below).  3.  Persistent Afib: He is rate controlled and asymptomatic. We should attempt to cardiovert and maintain SR since patient has undergone successful AVR and his LV function has improved. Intraoperative CHARLOTTE exclusion was deferred due to CHARLOTTE thrombus at the time operation.  We will schedule external cardioversion with general anesthesia at his convenience. He has not missed any dose of Eliquis in the last 30 days.     I will see Yeison Bryant back in 6 months or sooner on an as needed basis.      Scribed for Mahendra Waldron MD by Clau Saravia RN. 06/15/2021 16:12 EDT.     EMR Dragon/Transcription Disclaimer:  Much of this encounter note is an electronic transcription/translation of spoken language to printed text.  The electronic translation of spoken language may permit erroneous, or at times, nonsensical words or phrases to be inadvertently transcribed.  Although I have reviewed the note for such errors, some may still exist.             "

## 2021-06-17 LAB
BH CV ECHO MEAS - AO MAX PG (FULL): 25.1 MMHG
BH CV ECHO MEAS - AO MAX PG: 32.2 MMHG
BH CV ECHO MEAS - AO MEAN PG (FULL): 9.7 MMHG
BH CV ECHO MEAS - AO MEAN PG: 13 MMHG
BH CV ECHO MEAS - AO ROOT AREA (BSA CORRECTED): 1.8
BH CV ECHO MEAS - AO ROOT AREA: 16.2 CM^2
BH CV ECHO MEAS - AO ROOT DIAM: 4.5 CM
BH CV ECHO MEAS - AO V2 MAX: 283.5 CM/SEC
BH CV ECHO MEAS - AO V2 MEAN: 162.8 CM/SEC
BH CV ECHO MEAS - AO V2 VTI: 45 CM
BH CV ECHO MEAS - AVA(I,A): 2.7 CM^2
BH CV ECHO MEAS - AVA(I,D): 2.7 CM^2
BH CV ECHO MEAS - AVA(V,A): 2.5 CM^2
BH CV ECHO MEAS - AVA(V,D): 2.5 CM^2
BH CV ECHO MEAS - BSA(HAYCOCK): 2.6 M^2
BH CV ECHO MEAS - BSA: 2.5 M^2
BH CV ECHO MEAS - BZI_BMI: 34.4 KILOGRAMS/M^2
BH CV ECHO MEAS - BZI_METRIC_HEIGHT: 188 CM
BH CV ECHO MEAS - BZI_METRIC_WEIGHT: 121.6 KG
BH CV ECHO MEAS - EDV(CUBED): 128.5 ML
BH CV ECHO MEAS - EDV(MOD-SP2): 140 ML
BH CV ECHO MEAS - EDV(MOD-SP4): 155 ML
BH CV ECHO MEAS - EDV(TEICH): 120.8 ML
BH CV ECHO MEAS - EF(CUBED): 54.4 %
BH CV ECHO MEAS - EF(MOD-BP): 45 %
BH CV ECHO MEAS - EF(MOD-SP2): 48.6 %
BH CV ECHO MEAS - EF(MOD-SP4): 43.9 %
BH CV ECHO MEAS - EF(TEICH): 46 %
BH CV ECHO MEAS - ESV(CUBED): 58.5 ML
BH CV ECHO MEAS - ESV(MOD-SP2): 72 ML
BH CV ECHO MEAS - ESV(MOD-SP4): 87 ML
BH CV ECHO MEAS - ESV(TEICH): 65.2 ML
BH CV ECHO MEAS - FS: 23.1 %
BH CV ECHO MEAS - IVS/LVPW: 1
BH CV ECHO MEAS - IVSD: 1.4 CM
BH CV ECHO MEAS - LA DIMENSION: 4.1 CM
BH CV ECHO MEAS - LA/AO: 0.9
BH CV ECHO MEAS - LAD MAJOR: 6.7 CM
BH CV ECHO MEAS - LV DIASTOLIC VOL/BSA (35-75): 63 ML/M^2
BH CV ECHO MEAS - LV MASS(C)D: 297 GRAMS
BH CV ECHO MEAS - LV MASS(C)DI: 120.7 GRAMS/M^2
BH CV ECHO MEAS - LV MAX PG: 7.1 MMHG
BH CV ECHO MEAS - LV MEAN PG: 3.3 MMHG
BH CV ECHO MEAS - LV SYSTOLIC VOL/BSA (12-30): 35.4 ML/M^2
BH CV ECHO MEAS - LV V1 MAX: 132.8 CM/SEC
BH CV ECHO MEAS - LV V1 MEAN: 81.7 CM/SEC
BH CV ECHO MEAS - LV V1 VTI: 22.1 CM
BH CV ECHO MEAS - LVIDD: 5 CM
BH CV ECHO MEAS - LVIDS: 3.9 CM
BH CV ECHO MEAS - LVLD AP2: 8.7 CM
BH CV ECHO MEAS - LVLD AP4: 8.2 CM
BH CV ECHO MEAS - LVLS AP2: 7.9 CM
BH CV ECHO MEAS - LVLS AP4: 7.2 CM
BH CV ECHO MEAS - LVOT AREA (M): 5.3 CM^2
BH CV ECHO MEAS - LVOT AREA: 5.4 CM^2
BH CV ECHO MEAS - LVOT DIAM: 2.6 CM
BH CV ECHO MEAS - LVPWD: 1.4 CM
BH CV ECHO MEAS - MED PEAK E' VEL: 5.9 CM/SEC
BH CV ECHO MEAS - MV DEC SLOPE: 488.7 CM/SEC^2
BH CV ECHO MEAS - MV P1/2T MAX VEL: 105.6 CM/SEC
BH CV ECHO MEAS - MV P1/2T: 63.3 MSEC
BH CV ECHO MEAS - MVA P1/2T LCG: 2.1 CM^2
BH CV ECHO MEAS - MVA(P1/2T): 3.5 CM^2
BH CV ECHO MEAS - PA ACC SLOPE: 698.2 CM/SEC^2
BH CV ECHO MEAS - PA ACC TIME: 0.12 SEC
BH CV ECHO MEAS - PA MAX PG: 6.8 MMHG
BH CV ECHO MEAS - PA PR(ACCEL): 24.9 MMHG
BH CV ECHO MEAS - PA V2 MAX: 127.6 CM/SEC
BH CV ECHO MEAS - RAP SYSTOLE: 8 MMHG
BH CV ECHO MEAS - RVSP: 43 MMHG
BH CV ECHO MEAS - SI(AO): 295.8 ML/M^2
BH CV ECHO MEAS - SI(CUBED): 28.4 ML/M^2
BH CV ECHO MEAS - SI(LVOT): 48.7 ML/M^2
BH CV ECHO MEAS - SI(MOD-SP2): 27.6 ML/M^2
BH CV ECHO MEAS - SI(MOD-SP4): 27.6 ML/M^2
BH CV ECHO MEAS - SI(TEICH): 22.6 ML/M^2
BH CV ECHO MEAS - SV(AO): 727.9 ML
BH CV ECHO MEAS - SV(CUBED): 70 ML
BH CV ECHO MEAS - SV(LVOT): 120 ML
BH CV ECHO MEAS - SV(MOD-SP2): 68 ML
BH CV ECHO MEAS - SV(MOD-SP4): 68 ML
BH CV ECHO MEAS - SV(TEICH): 55.6 ML
BH CV ECHO MEAS - TAPSE (>1.6): 1.4 CM
BH CV ECHO MEAS - TR MAX PG: 35 MMHG
BH CV ECHO MEAS - TR MAX VEL: 277.5 CM/SEC
BH CV XLRA - RV BASE: 4.1 CM
BH CV XLRA - RV LENGTH: 8.6 CM
BH CV XLRA - RV MID: 4.7 CM
LEFT ATRIUM VOLUME INDEX: 33.7 ML/M^2
LEFT ATRIUM VOLUME: 83 ML
MAXIMAL PREDICTED HEART RATE: 156 BPM
STRESS TARGET HR: 133 BPM

## 2021-06-22 LAB
FUNGUS WND CULT: NORMAL
MYCOBACTERIUM SPEC CULT: NORMAL
NIGHT BLUE STAIN TISS: NORMAL

## 2021-06-30 ENCOUNTER — TRANSCRIBE ORDERS (OUTPATIENT)
Dept: ADMINISTRATIVE | Facility: HOSPITAL | Age: 66
End: 2021-06-30

## 2021-06-30 DIAGNOSIS — Z01.818 OTHER SPECIFIED PRE-OPERATIVE EXAMINATION: Primary | ICD-10-CM

## 2021-07-06 ENCOUNTER — LAB (OUTPATIENT)
Dept: LAB | Facility: HOSPITAL | Age: 66
End: 2021-07-06

## 2021-07-06 DIAGNOSIS — Z01.818 OTHER SPECIFIED PRE-OPERATIVE EXAMINATION: ICD-10-CM

## 2021-07-06 PROCEDURE — U0003 INFECTIOUS AGENT DETECTION BY NUCLEIC ACID (DNA OR RNA); SEVERE ACUTE RESPIRATORY SYNDROME CORONAVIRUS 2 (SARS-COV-2) (CORONAVIRUS DISEASE [COVID-19]), AMPLIFIED PROBE TECHNIQUE, MAKING USE OF HIGH THROUGHPUT TECHNOLOGIES AS DESCRIBED BY CMS-2020-01-R: HCPCS | Performed by: INTERNAL MEDICINE

## 2021-07-06 PROCEDURE — C9803 HOPD COVID-19 SPEC COLLECT: HCPCS

## 2021-07-07 LAB — SARS-COV-2 RNA RESP QL NAA+PROBE: NOT DETECTED

## 2021-07-09 ENCOUNTER — HOSPITAL ENCOUNTER (OUTPATIENT)
Dept: CARDIOLOGY | Facility: HOSPITAL | Age: 66
Discharge: HOME | End: 2021-07-09
Attending: INTERNAL MEDICINE | Admitting: INTERNAL MEDICINE

## 2021-07-09 ENCOUNTER — HOSPITAL ENCOUNTER (OUTPATIENT)
Dept: CARDIOLOGY | Facility: HOSPITAL | Age: 66
Discharge: HOME | End: 2021-07-09

## 2021-07-09 ENCOUNTER — PREP FOR SURGERY (OUTPATIENT)
Dept: OTHER | Facility: HOSPITAL | Age: 66
End: 2021-07-09

## 2021-07-09 ENCOUNTER — APPOINTMENT (OUTPATIENT)
Dept: CARDIOLOGY | Facility: HOSPITAL | Age: 66
End: 2021-07-09

## 2021-07-09 VITALS
OXYGEN SATURATION: 98 % | HEIGHT: 74 IN | SYSTOLIC BLOOD PRESSURE: 112 MMHG | BODY MASS INDEX: 34.11 KG/M2 | TEMPERATURE: 97.3 F | WEIGHT: 265.8 LBS | HEART RATE: 60 BPM | RESPIRATION RATE: 16 BRPM | DIASTOLIC BLOOD PRESSURE: 68 MMHG

## 2021-07-09 DIAGNOSIS — I48.19 ATRIAL FIBRILLATION, PERSISTENT (HCC): ICD-10-CM

## 2021-07-09 LAB
ALBUMIN SERPL-MCNC: 4.6 G/DL (ref 3.5–5.2)
ALBUMIN/GLOB SERPL: 2.1 G/DL
ALP SERPL-CCNC: 143 U/L (ref 39–117)
ALT SERPL W P-5'-P-CCNC: 18 U/L (ref 1–41)
ANION GAP SERPL CALCULATED.3IONS-SCNC: 10 MMOL/L (ref 5–15)
AST SERPL-CCNC: 22 U/L (ref 1–40)
BH CV ECHO MEAS - AO MAX PG: 17 MMHG
BH CV ECHO MEAS - AO MEAN PG: 8 MMHG
BH CV ECHO MEAS - AO ROOT DIAM: 4.3 CM
BH CV ECHO MEAS - BSA(HAYCOCK): 2.5 M^2
BH CV ECHO MEAS - BSA: 2.4 M^2
BH CV ECHO MEAS - BZI_BMI: 33.8 KILOGRAMS/M^2
BH CV ECHO MEAS - BZI_METRIC_HEIGHT: 188 CM
BH CV ECHO MEAS - BZI_METRIC_WEIGHT: 119.3 KG
BH CV ECHO MEAS - RAP SYSTOLE: 3 MMHG
BH CV ECHO MEAS - RVSP: 35 MMHG
BH CV ECHO MEAS - TR MAX PG: 32 MMHG
BH CV ECHO MEAS - TR MAX VEL: 284 CM/SEC
BILIRUB SERPL-MCNC: 1.3 MG/DL (ref 0–1.2)
BUN SERPL-MCNC: 21 MG/DL (ref 8–23)
BUN/CREAT SERPL: 26.6 (ref 7–25)
CALCIUM SPEC-SCNC: 9.3 MG/DL (ref 8.6–10.5)
CHLORIDE SERPL-SCNC: 104 MMOL/L (ref 98–107)
CO2 SERPL-SCNC: 25 MMOL/L (ref 22–29)
CREAT SERPL-MCNC: 0.79 MG/DL (ref 0.76–1.27)
DEPRECATED RDW RBC AUTO: 49.2 FL (ref 37–54)
ERYTHROCYTE [DISTWIDTH] IN BLOOD BY AUTOMATED COUNT: 15.1 % (ref 12.3–15.4)
GFR SERPL CREATININE-BSD FRML MDRD: 99 ML/MIN/1.73
GLOBULIN UR ELPH-MCNC: 2.2 GM/DL
GLUCOSE SERPL-MCNC: 127 MG/DL (ref 65–99)
HCT VFR BLD AUTO: 44.4 % (ref 37.5–51)
HGB BLD-MCNC: 14.3 G/DL (ref 13–17.7)
MAXIMAL PREDICTED HEART RATE: 156 BPM
MCH RBC QN AUTO: 28.9 PG (ref 26.6–33)
MCHC RBC AUTO-ENTMCNC: 32.2 G/DL (ref 31.5–35.7)
MCV RBC AUTO: 89.7 FL (ref 79–97)
PLATELET # BLD AUTO: 187 10*3/MM3 (ref 140–450)
PMV BLD AUTO: 9.5 FL (ref 6–12)
POTASSIUM SERPL-SCNC: 4.8 MMOL/L (ref 3.5–5.2)
PROT SERPL-MCNC: 6.8 G/DL (ref 6–8.5)
QT INTERVAL: 426 MS
QT INTERVAL: 456 MS
QTC INTERVAL: 421 MS
QTC INTERVAL: 424 MS
RBC # BLD AUTO: 4.95 10*6/MM3 (ref 4.14–5.8)
SODIUM SERPL-SCNC: 139 MMOL/L (ref 136–145)
STRESS TARGET HR: 133 BPM
TSH SERPL DL<=0.05 MIU/L-ACNC: 1.94 UIU/ML (ref 0.27–4.2)
WBC # BLD AUTO: 8.36 10*3/MM3 (ref 3.4–10.8)

## 2021-07-09 PROCEDURE — 85027 COMPLETE CBC AUTOMATED: CPT | Performed by: PHYSICIAN ASSISTANT

## 2021-07-09 PROCEDURE — 93325 DOPPLER ECHO COLOR FLOW MAPG: CPT | Performed by: INTERNAL MEDICINE

## 2021-07-09 PROCEDURE — 93005 ELECTROCARDIOGRAM TRACING: CPT | Performed by: PHYSICIAN ASSISTANT

## 2021-07-09 PROCEDURE — 25010000002 MIDAZOLAM PER 1 MG: Performed by: INTERNAL MEDICINE

## 2021-07-09 PROCEDURE — 93312 ECHO TRANSESOPHAGEAL: CPT | Performed by: INTERNAL MEDICINE

## 2021-07-09 PROCEDURE — 92960 CARDIOVERSION ELECTRIC EXT: CPT | Performed by: INTERNAL MEDICINE

## 2021-07-09 PROCEDURE — 93321 DOPPLER ECHO F-UP/LMTD STD: CPT | Performed by: INTERNAL MEDICINE

## 2021-07-09 PROCEDURE — 92961 CARDIOVERSION ELECTRIC INT: CPT | Performed by: INTERNAL MEDICINE

## 2021-07-09 PROCEDURE — 93312 ECHO TRANSESOPHAGEAL: CPT

## 2021-07-09 PROCEDURE — 80053 COMPREHEN METABOLIC PANEL: CPT | Performed by: PHYSICIAN ASSISTANT

## 2021-07-09 PROCEDURE — 93321 DOPPLER ECHO F-UP/LMTD STD: CPT

## 2021-07-09 PROCEDURE — 93005 ELECTROCARDIOGRAM TRACING: CPT | Performed by: INTERNAL MEDICINE

## 2021-07-09 PROCEDURE — 93325 DOPPLER ECHO COLOR FLOW MAPG: CPT

## 2021-07-09 PROCEDURE — 36415 COLL VENOUS BLD VENIPUNCTURE: CPT

## 2021-07-09 PROCEDURE — 84443 ASSAY THYROID STIM HORMONE: CPT | Performed by: PHYSICIAN ASSISTANT

## 2021-07-09 PROCEDURE — 92960 CARDIOVERSION ELECTRIC EXT: CPT

## 2021-07-09 RX ORDER — ACETAMINOPHEN 325 MG/1
650 TABLET ORAL EVERY 4 HOURS PRN
Status: DISCONTINUED | OUTPATIENT
Start: 2021-07-09 | End: 2021-07-09 | Stop reason: HOSPADM

## 2021-07-09 RX ORDER — MIDAZOLAM HYDROCHLORIDE 1 MG/ML
INJECTION INTRAMUSCULAR; INTRAVENOUS
Status: DISCONTINUED
Start: 2021-07-09 | End: 2021-07-09 | Stop reason: HOSPADM

## 2021-07-09 RX ORDER — SODIUM CHLORIDE 0.9 % (FLUSH) 0.9 %
10 SYRINGE (ML) INJECTION AS NEEDED
Status: CANCELLED | OUTPATIENT
Start: 2021-07-09

## 2021-07-09 RX ORDER — SODIUM CHLORIDE 0.9 % (FLUSH) 0.9 %
3 SYRINGE (ML) INJECTION EVERY 12 HOURS SCHEDULED
Status: CANCELLED | OUTPATIENT
Start: 2021-07-09

## 2021-07-09 RX ORDER — MIDAZOLAM HYDROCHLORIDE 1 MG/ML
INJECTION INTRAMUSCULAR; INTRAVENOUS
Status: COMPLETED | OUTPATIENT
Start: 2021-07-09 | End: 2021-07-09

## 2021-07-09 RX ORDER — ACETAMINOPHEN 325 MG/1
650 TABLET ORAL EVERY 4 HOURS PRN
Status: CANCELLED | OUTPATIENT
Start: 2021-07-09

## 2021-07-09 RX ORDER — NALOXONE HYDROCHLORIDE 0.4 MG/ML
INJECTION, SOLUTION INTRAMUSCULAR; INTRAVENOUS; SUBCUTANEOUS
Status: DISCONTINUED
Start: 2021-07-09 | End: 2021-07-09 | Stop reason: WASHOUT

## 2021-07-09 RX ORDER — FENTANYL CITRATE 50 UG/ML
INJECTION, SOLUTION INTRAMUSCULAR; INTRAVENOUS
Status: DISCONTINUED
Start: 2021-07-09 | End: 2021-07-09 | Stop reason: WASHOUT

## 2021-07-09 RX ORDER — FLUMAZENIL 0.1 MG/ML
INJECTION INTRAVENOUS
Status: DISCONTINUED
Start: 2021-07-09 | End: 2021-07-09 | Stop reason: WASHOUT

## 2021-07-09 RX ORDER — SODIUM CHLORIDE 0.9 % (FLUSH) 0.9 %
3 SYRINGE (ML) INJECTION EVERY 12 HOURS SCHEDULED
Status: DISCONTINUED | OUTPATIENT
Start: 2021-07-09 | End: 2021-07-09 | Stop reason: HOSPADM

## 2021-07-09 RX ORDER — SODIUM CHLORIDE 0.9 % (FLUSH) 0.9 %
10 SYRINGE (ML) INJECTION AS NEEDED
Status: DISCONTINUED | OUTPATIENT
Start: 2021-07-09 | End: 2021-07-09 | Stop reason: HOSPADM

## 2021-07-09 RX ADMIN — MIDAZOLAM 1 MG: 1 INJECTION INTRAMUSCULAR; INTRAVENOUS at 11:19

## 2021-07-09 RX ADMIN — METHOHEXITAL SODIUM 30 MG: 500 INJECTION, POWDER, LYOPHILIZED, FOR SOLUTION INTRAMUSCULAR; INTRAVENOUS; RECTAL at 11:19

## 2021-07-09 RX ADMIN — MIDAZOLAM 1 MG: 1 INJECTION INTRAMUSCULAR; INTRAVENOUS at 11:29

## 2021-07-09 RX ADMIN — METHOHEXITAL SODIUM 20 MG: 500 INJECTION, POWDER, LYOPHILIZED, FOR SOLUTION INTRAMUSCULAR; INTRAVENOUS; RECTAL at 11:24

## 2021-07-09 RX ADMIN — METHOHEXITAL SODIUM 20 MG: 500 INJECTION, POWDER, LYOPHILIZED, FOR SOLUTION INTRAMUSCULAR; INTRAVENOUS; RECTAL at 11:29

## 2021-07-09 NOTE — PROCEDURES
Diagnosis:  Atrial fibrillation or Flutter    PROCEDURE PERFORMED: External electrical cardioversion.    Anesthesia: Sedation with:  1. 4 mg Versed  2. 70 mg Brevital    Estimated Blood Loss: Less than 10 mL     Specimens: None    PROCEDURE IN DETAIL: The patient was brought into the CVOU in a fasting  state. The patient was given moderate sedation during which he received 200  joules of external electrical cardioversion x 3 that converted atrial  fibrillation to sinus bradycardia.  A DIONISIO was performed prior to the procedure.  This showed no evidence of left atrial appendage thrombus.    IMPRESSION: Successful conversion of atrial fibrillation to sinus bradycardia

## 2021-07-09 NOTE — INTERVAL H&P NOTE
H&P reviewed. The patient was examined and there are no changes to the H&P.      Vitals and labs today:  There were no vitals filed for this visit.    Lab Results   Component Value Date    WBC 8.36 07/09/2021    HGB 14.3 07/09/2021    HCT 44.4 07/09/2021    MCV 89.7 07/09/2021     07/09/2021     Lab Results   Component Value Date    GLUCOSE 127 (H) 07/09/2021    BUN 21 07/09/2021    CREATININE 0.79 07/09/2021    EGFRIFNONA 99 07/09/2021    BCR 26.6 (H) 07/09/2021    K 4.8 07/09/2021    CO2 25.0 07/09/2021    CALCIUM 9.3 07/09/2021    ALBUMIN 4.60 07/09/2021    AST 22 07/09/2021    ALT 18 07/09/2021     Lab Results   Component Value Date    TSH 1.940 07/09/2021       Echo 6/4/21:  Interpretation Summary    · Left ventricular ejection fraction appears to be 41 - 45%.  · Left ventricular wall thickness is consistent with mild concentric hypertrophy.  · There is a normally functioning bioprosthetic aortic valve.  · Estimated right ventricular systolic pressure from tricuspid regurgitation is mildly elevated (35-45 mmHg). Calculated right ventricular systolic pressure from tricuspid regurgitation is 43 mmHg.  · Interrogation of the mitral valve is limited; however, moderate mitral regurgitation is suggested.  · The estimated RV systolic pressure is moderately elevated at 43 mmHg.        Assessment:  · 64 y/o WM with history of recent bioprosthetic AVR for low flow low gradient severe AS. This was done in March 2021 with concomitant repair of ascending AA and MAZE procedure for atrial fibrillation. CHARLOTTE clip was unable to be delivered due to presence of thrombus within the CHARLOTTE.   · He has been chronically anticoagulated with Eliquis and denies any missed doses in the past 30 days.       Plan:  · DIONISIO/ECV today per Dr. Guardado. Risks, benefits and alternatives to the procedure explained to the patient and he understands and wishes to proceed.       Electronically signed by Eleanor Novak PA-C, 07/09/21,  11:15 AM EDT.

## 2021-08-23 ENCOUNTER — TELEPHONE (OUTPATIENT)
Dept: CARDIAC SURGERY | Facility: CLINIC | Age: 66
End: 2021-08-23

## 2021-08-23 NOTE — TELEPHONE ENCOUNTER
Mr. Bryant called asking if it was ok to have MRI from his history of AVR/ repair of ascending aortic aneurysm. I spoke with Dr. Guerrier who stated ok to have MRI from his standpoint

## 2021-09-14 ENCOUNTER — OFFICE VISIT (OUTPATIENT)
Dept: CARDIOLOGY | Facility: CLINIC | Age: 66
End: 2021-09-14

## 2021-09-14 VITALS
WEIGHT: 265 LBS | SYSTOLIC BLOOD PRESSURE: 154 MMHG | BODY MASS INDEX: 34.01 KG/M2 | HEIGHT: 74 IN | OXYGEN SATURATION: 96 % | HEART RATE: 49 BPM | DIASTOLIC BLOOD PRESSURE: 90 MMHG

## 2021-09-14 DIAGNOSIS — Q23.1 BICUSPID AORTIC VALVE: Primary | ICD-10-CM

## 2021-09-14 DIAGNOSIS — I48.19 PERSISTENT ATRIAL FIBRILLATION (HCC): ICD-10-CM

## 2021-09-14 PROCEDURE — 93000 ELECTROCARDIOGRAM COMPLETE: CPT | Performed by: INTERNAL MEDICINE

## 2021-09-14 PROCEDURE — 99214 OFFICE O/P EST MOD 30 MIN: CPT | Performed by: INTERNAL MEDICINE

## 2021-09-14 RX ORDER — CARVEDILOL 12.5 MG/1
12.5 TABLET ORAL 2 TIMES DAILY WITH MEALS
Qty: 180 TABLET | Refills: 3 | Status: SHIPPED | OUTPATIENT
Start: 2021-09-14 | End: 2022-03-15 | Stop reason: SDUPTHER

## 2021-09-14 NOTE — PROGRESS NOTES
OFFICE FOLLOW UP     Date of Encounter:2021     Name: Yeison Bryant  : 1955  Address: [address] [place] [address] [place] [address]    PCP: Jerry Garcia PA  [address] [place] [address] [place] [address]    Yeison Bryant is a 66 y.o. male.      Chief Complaint: Follow up of VHD, Afib    Problem List:   1. Ascending aortic aneurysm  a. 5.6cm on CT chest   b. Replacement and repair of AAA with 30mm Dacron graft 3/3/2021, Dr. Guerrier  2. Low flow- low gradient Aortic stenosis  a. Right and LHC 21: severe global hypokinesia and EF 20%, mod-severe PAH with elevated LVEDP; cardiac output low at 4.9L/min, normal coronaries, dilated aortic root   b. DIONISIO 21: EF 21-25%, bicuspid aortic valve with mild AS, moderate functional MR, severe dilation of ascending aorta measuring 5.7cm, RV is mildly dilated   c. Dobutamine stress echo 21: Best diagnosis is low flow, low gradient aortic stenosis.  d. AV Replacement 27 trifecta bioprosthetic tissue valve, 3/3/2021 Dr. Guerrier  e. Echo, 6/15/2021: EF 41-45%, normal function of bioprosthetic tissue valve, RVSP 43  f. DIONISIO, 2021: EF 41-45%, bioprosthetic AV is normal, moderate MR, RVSP 35-45  3. Atrial fibrillation   a. Diagnosed 21 at time of hospitalization in [place]  b. CHADsVASC=2  c. Echo 21: EF 56-60%, LA is mildly increased, RVSP 42mmHg, moderate dilation of the aortic root   d. MAZE procedure with ablation of left/right superior and inferior pulmonary veins, 3/3/2021. Unable to clip CHARLOTTE as DIONISIO demonstrated a clot within the appendage.  e. Heart monitor revealed 100% Afib, May 2021 - controlled VR 96%, RVR 3%-asymptomatic and anticoagulated on Eliquis.   f. DIONISIO/ECV 2021  4. Covid 19 infection 20  5. History of tobacco abuse, inactive   6. Probable ALESSANDRO   7. Obesity    Allergies:  No Known Allergies    Current Medications:  Current Outpatient Medications   Medication Instructions   • acetaminophen (TYLENOL) 650 mg, Oral, Every 6  "Hours PRN   • apixaban (ELIQUIS) 5 mg, Oral, Every 12 Hours Scheduled   • aspirin 81 mg, Oral, Daily   • atorvastatin (LIPITOR) 40 mg, Oral, Daily   • carvedilol (COREG) 25 mg, Oral, 2 Times Daily With Meals   • colchicine 0.6 mg, Oral, Daily PRN   • fluticasone (FLONASE) 50 MCG/ACT nasal spray 2 sprays, Nasal, Daily PRN   • losartan (COZAAR) 25 mg, Oral, Daily   • multivitamin with minerals (MULTIVITAMIN ADULTS PO) 1 tablet, Oral, Daily        History of Present Illness:     Yeison Bryant returns for hospital follow up today. He reports home pulse rates are \"low\" and home blood pressures are 129-144/71-80. He plays golf once a week and goes to the gym 3 times a week. He tires easily but does not get short of breath. He denies chest pain, edema, tachy palpitations or presyncope or syncope.     The following portions of the patient's history were reviewed and updated as appropriate: allergies, current medications and problem list.    ROS: Pertinent positives as listed in the HPI.  All other systems reviewed and negative.    Objective:    Vitals:    09/14/21 1401 09/14/21 1402   BP: 148/68 154/90   BP Location: Right arm Right arm   Patient Position: Sitting Standing   Pulse: 51 (!) 49   SpO2: 96%    Weight: 120 kg (265 lb)    Height: 188 cm (74\")      Body mass index is 34.02 kg/m².    Physical Exam:  GENERAL: Alert, cooperative, in no acute distress.   HEENT: Normocephalic, no adenopathy, no jugular venous distention  HEART: No discrete PMI is noted. Bradycardic, irregularly irregular rhythm, and no murmurs, gallops, or rubs.   LUNGS: Clear to auscultation bilaterally. No wheezing, rales or ronchi.  ABDOMEN: Soft, bowel sounds present, non-tender   NEUROLOGIC: No focal abnormalities involving strength or sensation are noted.   EXTREMITIES: No clubbing, cyanosis, or edema noted.      Diagnostic Data:    Lab Results   Component Value Date    TSH 1.940 07/09/2021      Lab Results   Component Value Date    GLUCOSE " 127 (H) 07/09/2021    BUN 21 07/09/2021    CREATININE 0.79 07/09/2021    EGFRIFNONA 99 07/09/2021    BCR 26.6 (H) 07/09/2021    K 4.8 07/09/2021    CO2 25.0 07/09/2021    CALCIUM 9.3 07/09/2021    ALBUMIN 4.60 07/09/2021    AST 22 07/09/2021    ALT 18 07/09/2021      Lab Results   Component Value Date    WBC 8.36 07/09/2021    HGB 14.3 07/09/2021    HCT 44.4 07/09/2021    MCV 89.7 07/09/2021     07/09/2021      Lab Results   Component Value Date    HGBA1C 5.50 05/05/2021      Lab Results   Component Value Date    CHOL 181 01/19/2021    TRIG 124 01/19/2021    HDL 56 01/19/2021     (H) 01/19/2021        ECG 12 Lead    Date/Time: 9/14/2021 2:34 PM  Performed by: Mahendra Waldron MD  Authorized by: Mahendra Waldron MD   Comparison: compared with previous ECG from 7/9/2021  Rhythm: sinus bradycardia  Rhythm comments: with competing accelerated junctional  Conduction: 1st degree AV block  Other findings: non-specific ST-T wave changes    Clinical impression: abnormal EKG              Assessment and Plan:   1.  VHD:  Active and asymptomatic.   2.  PAF:  He denies tachy palpitations. EKG shows sinus bradycardia with competing junctional rhythm. We will decrease carvedilol to 12.5 mg twice daily.   3.  AAA: continue CTS follow up.  4.  HTN: elevated today with readings at home that are better. He will continue to track home blood pressures. Follow up with PCP in the next 1-2 months to target BP to less than 130/80. We decreased carvedilol today due to bradycardia but he may require increase in losartan to manage BP.     I will see Yeison Bryant back in 6 months with an EKG or sooner on an as needed basis.    EMR Dragon/Transcription Disclaimer:  Much of this encounter note is an electronic transcription/translation of spoken language to printed text.  The electronic translation of spoken language may permit erroneous, or at times, nonsensical words or phrases to be inadvertently transcribed.  Although I  have reviewed the note for such errors, some may still exist.

## 2022-03-14 NOTE — PROGRESS NOTES
"[facility]      Identification: Yeison Bryant is a 66 y.o. male who resides in [place], [place]    Reason for visit:  · Status post Bentall  · HFrEF  · Atrial fibrillation  · CV risk factors      Subjective      Yeison Bryant presents to [facility] for followup.    Yeison returns to the office today for routine follow-up.  He previously saw Dr. Lan Waldron, who has retired.  Yeison has been doing well 1 year following Bentall operation by Dr. Guerrier for bicuspid aortic valve stenosis and aortic aneurysm.  He is exercising 3 days a week with a .  He has occasional sharp stabbing pains when working out which last seconds in duration and are always in the same spot in the upper sternum.  He denies any unexpected shortness of breath.  He denies any TIA or stroke symptoms.  He cannot discern whether he is in atrial fibrillation or not, and never has been able to.            Review of Systems   All other systems reviewed and are negative.      Objective     /70 (BP Location: Left arm, Patient Position: Sitting)   Pulse 74   Ht 188 cm (74\")   Wt 120 kg (264 lb)   SpO2 98%   BMI 33.90 kg/m²       Constitutional:       Appearance: Healthy appearance. Well-developed.   Eyes:      General: Lids are normal. No scleral icterus.  HENT:      Head: Normocephalic and atraumatic.   Neck:      Thyroid: No thyroid mass.      Vascular: No carotid bruit or JVD. JVD normal.   Pulmonary:      Effort: Pulmonary effort is normal.      Breath sounds: Normal breath sounds.   Cardiovascular:      Normal rate. Irregularly irregular rhythm.      Murmurs: There is no murmur.      No gallop.   Musculoskeletal:      Extremities: No clubbing present.Skin:     General: Skin is warm and dry. There is no cyanosis.   Neurological:      General: No focal deficit present.      Mental Status: Alert.   Psychiatric:         Attention and Perception: Attention normal.         Behavior: Behavior normal. Behavior is " cooperative.         Result Review :    Lab Results   Component Value Date    GLUCOSE 127 (H) 07/09/2021    BUN 21 07/09/2021    CREATININE 0.79 07/09/2021    EGFRIFNONA 99 07/09/2021    BCR 26.6 (H) 07/09/2021    K 4.8 07/09/2021    CO2 25.0 07/09/2021    CALCIUM 9.3 07/09/2021    ALBUMIN 4.60 07/09/2021    AST 22 07/09/2021    ALT 18 07/09/2021     Lab Results   Component Value Date    WBC 8.36 07/09/2021    HGB 14.3 07/09/2021    HCT 44.4 07/09/2021    MCV 89.7 07/09/2021     07/09/2021     Lab Results   Component Value Date    HGBA1C 5.50 05/05/2021     Lab date: 6/21/2021  • FLP: TC 67, TG 67, HDL 57, LDL 48      ECG 12 Lead    Date/Time: 3/15/2022 9:45 AM  Performed by: Vic Peña IV, MD  Authorized by: Vic Peña IV, MD   Comparison: compared with previous ECG from 9/14/2021  Comparison to previous ECG: Atrial fibrillation has replaced sinus bradycardia  Rhythm: atrial fibrillation  BPM: 73  Other findings: non-specific ST-T wave changes    Clinical impression: abnormal EKG  Comments: Diffuse ST and T wave abnormality             Assessment     Problem List Items Addressed This Visit        Cardiology Problems    Status post Bentall (Chronic)    Overview     · Previous 5.6cm ascending aortic aneurysm with bicuspid aortic valve stenosis  · Bentall with bioprosthetic tissue valve (#27 trifecta) by Thai Guerrier, 3/3/2021           Current Assessment & Plan     · No new issues  · Repeat echo at next visit  · Continue low-dose aspirin           Relevant Medications    aspirin 81 MG EC tablet    Other Relevant Orders    Adult Transthoracic Echo Complete W/ Cont if Necessary Per Protocol    Chronic systolic congestive heart failure (HCC) - Primary (Chronic)    Overview     · Echo (6/15/2021): LVEF 42%.           Current Assessment & Plan     · Stage C HFrEF with stable NYHA class II symptoms  · Continue carvedilol and losartan  · Obtain echocardiogram at next office visit to  reassess LVEF           Relevant Medications    losartan (Cozaar) 25 MG tablet    carvedilol (Coreg) 12.5 MG tablet    Other Relevant Orders    Adult Transthoracic Echo Complete W/ Cont if Necessary Per Protocol    Permanent atrial fibrillation (HCC) (Chronic)    Overview     · Asymptomatic  · Diagnosed 1/18/21 at time of hospitalization in [place]  · CHADsVASC=2  · Echo (1/19/21): EF 56-60%, LA is mildly increased, RVSP 42mmHg, moderate dilation of the aortic root.  · MAZE procedure with ablation of left/right superior and inferior pulmonary veins (3/3/2021): Unable to clip CHARLOTTE as DIONISIO demonstrated a clot within the appendage.  · 30 day monitor (6/4/2021): 100% Afib controlled VR 96%, RVR 3%-asymptomatic.  · DIONISIO/ECV (7/9/2021): LVEF 45%. Moderate MR. Moderate to severe tricuspid valve regurgitation is present.           Current Assessment & Plan     · Asymptomatic  · Rate controlled today  · Continue apixaban for stroke prophylaxis  · Continue carvedilol for rate control           Relevant Medications    apixaban (ELIQUIS) 5 MG tablet tablet    carvedilol (Coreg) 12.5 MG tablet          Plan   • Continue present medical therapy  • Echo prior to next visit      Follow-up   Return in about 6 months (around 9/15/2022).  At [facility]        Marcial Peña MD, FACC, [facility]  3/15/2022

## 2022-03-15 ENCOUNTER — OFFICE VISIT (OUTPATIENT)
Dept: CARDIOLOGY | Facility: CLINIC | Age: 67
End: 2022-03-15

## 2022-03-15 VITALS
OXYGEN SATURATION: 98 % | SYSTOLIC BLOOD PRESSURE: 132 MMHG | WEIGHT: 264 LBS | BODY MASS INDEX: 33.88 KG/M2 | DIASTOLIC BLOOD PRESSURE: 70 MMHG | HEART RATE: 74 BPM | HEIGHT: 74 IN

## 2022-03-15 DIAGNOSIS — I48.21 PERMANENT ATRIAL FIBRILLATION: ICD-10-CM

## 2022-03-15 DIAGNOSIS — I71.20 THORACIC AORTIC ANEURYSM WITHOUT RUPTURE: ICD-10-CM

## 2022-03-15 DIAGNOSIS — I50.22 CHRONIC SYSTOLIC CONGESTIVE HEART FAILURE: Primary | ICD-10-CM

## 2022-03-15 PROBLEM — I51.7 CARDIOMEGALY: Status: RESOLVED | Noted: 2021-01-18 | Resolved: 2022-03-15

## 2022-03-15 PROBLEM — Q23.1 BICUSPID AORTIC VALVE: Status: RESOLVED | Noted: 2021-02-08 | Resolved: 2022-03-15

## 2022-03-15 PROBLEM — I48.91 ATRIAL FIBRILLATION: Status: RESOLVED | Noted: 2021-01-18 | Resolved: 2022-03-15

## 2022-03-15 PROBLEM — U07.1 COVID-19 VIRUS DETECTED: Status: RESOLVED | Noted: 2021-01-18 | Resolved: 2022-03-15

## 2022-03-15 PROBLEM — R73.9 HYPERGLYCEMIA: Status: RESOLVED | Noted: 2021-01-18 | Resolved: 2022-03-15

## 2022-03-15 PROBLEM — T81.9XXA COMPLICATION OF SURGICAL PROCEDURE: Status: RESOLVED | Noted: 2021-05-03 | Resolved: 2022-03-15

## 2022-03-15 PROBLEM — R07.9 CHEST PAIN IN ADULT: Status: RESOLVED | Noted: 2021-01-18 | Resolved: 2022-03-15

## 2022-03-15 PROBLEM — Q23.81 BICUSPID AORTIC VALVE: Status: RESOLVED | Noted: 2021-02-08 | Resolved: 2022-03-15

## 2022-03-15 PROBLEM — I50.9 CHF (CONGESTIVE HEART FAILURE): Status: RESOLVED | Noted: 2021-01-18 | Resolved: 2022-03-15

## 2022-03-15 PROBLEM — E66.9 OBESITY (BMI 30-39.9): Chronic | Status: RESOLVED | Noted: 2021-01-18 | Resolved: 2022-03-15

## 2022-03-15 PROCEDURE — 93000 ELECTROCARDIOGRAM COMPLETE: CPT | Performed by: INTERNAL MEDICINE

## 2022-03-15 PROCEDURE — 99214 OFFICE O/P EST MOD 30 MIN: CPT | Performed by: INTERNAL MEDICINE

## 2022-03-15 RX ORDER — ASPIRIN 81 MG/1
81 TABLET ORAL DAILY
Qty: 90 TABLET | Refills: 3
Start: 2022-03-15

## 2022-03-15 RX ORDER — CARVEDILOL 12.5 MG/1
12.5 TABLET ORAL 2 TIMES DAILY WITH MEALS
Qty: 180 TABLET | Refills: 3 | Status: SHIPPED | OUTPATIENT
Start: 2022-03-15 | End: 2022-08-22 | Stop reason: SDUPTHER

## 2022-03-15 RX ORDER — LOSARTAN POTASSIUM 25 MG/1
25 TABLET ORAL DAILY
Qty: 90 TABLET | Refills: 3 | Status: SHIPPED | OUTPATIENT
Start: 2022-03-15 | End: 2022-10-12

## 2022-03-15 NOTE — ASSESSMENT & PLAN NOTE
· Asymptomatic  · Rate controlled today  · Continue apixaban for stroke prophylaxis  · Continue carvedilol for rate control

## 2022-08-01 ENCOUNTER — HOSPITAL ENCOUNTER (OUTPATIENT)
Dept: HOSPITAL 79 - KOH-I | Age: 67
End: 2022-08-01
Attending: NURSE PRACTITIONER
Payer: COMMERCIAL

## 2022-08-01 ENCOUNTER — TELEPHONE (OUTPATIENT)
Dept: CARDIOLOGY | Facility: CLINIC | Age: 67
End: 2022-08-01

## 2022-08-01 DIAGNOSIS — J90: ICD-10-CM

## 2022-08-01 DIAGNOSIS — R07.9: Primary | ICD-10-CM

## 2022-08-17 RX ORDER — CARVEDILOL 25 MG/1
TABLET ORAL
Qty: 180 TABLET | Refills: 3 | OUTPATIENT
Start: 2022-08-17

## 2022-08-22 DIAGNOSIS — I50.22 CHRONIC SYSTOLIC CONGESTIVE HEART FAILURE: ICD-10-CM

## 2022-08-22 RX ORDER — CARVEDILOL 12.5 MG/1
12.5 TABLET ORAL 2 TIMES DAILY WITH MEALS
Qty: 180 TABLET | Refills: 3 | Status: SHIPPED | OUTPATIENT
Start: 2022-08-22

## 2022-09-16 RX ORDER — SODIUM, POTASSIUM,MAG SULFATES 17.5-3.13G
2 SOLUTION, RECONSTITUTED, ORAL ORAL TAKE AS DIRECTED
Qty: 354 ML | Refills: 0 | Status: SHIPPED | OUTPATIENT
Start: 2022-09-16 | End: 2022-10-12

## 2022-09-28 ENCOUNTER — OUTSIDE FACILITY SERVICE (OUTPATIENT)
Dept: GASTROENTEROLOGY | Facility: CLINIC | Age: 67
End: 2022-09-28

## 2022-09-28 PROCEDURE — 45385 COLONOSCOPY W/LESION REMOVAL: CPT | Performed by: INTERNAL MEDICINE

## 2022-10-12 ENCOUNTER — HOSPITAL ENCOUNTER (OUTPATIENT)
Dept: CARDIOLOGY | Facility: HOSPITAL | Age: 67
Discharge: HOME | End: 2022-10-12
Admitting: INTERNAL MEDICINE

## 2022-10-12 ENCOUNTER — OFFICE VISIT (OUTPATIENT)
Dept: CARDIOLOGY | Facility: CLINIC | Age: 67
End: 2022-10-12

## 2022-10-12 VITALS — HEIGHT: 74 IN | BODY MASS INDEX: 33.88 KG/M2 | WEIGHT: 264 LBS

## 2022-10-12 VITALS
SYSTOLIC BLOOD PRESSURE: 152 MMHG | DIASTOLIC BLOOD PRESSURE: 93 MMHG | BODY MASS INDEX: 34.39 KG/M2 | HEIGHT: 74 IN | WEIGHT: 268 LBS | OXYGEN SATURATION: 98 % | HEART RATE: 71 BPM

## 2022-10-12 DIAGNOSIS — I50.22 CHRONIC SYSTOLIC CONGESTIVE HEART FAILURE: ICD-10-CM

## 2022-10-12 DIAGNOSIS — I71.21 ANEURYSM OF ASCENDING AORTA WITHOUT RUPTURE: Chronic | ICD-10-CM

## 2022-10-12 DIAGNOSIS — I10 ESSENTIAL HYPERTENSION: ICD-10-CM

## 2022-10-12 DIAGNOSIS — I50.22 CHRONIC SYSTOLIC CONGESTIVE HEART FAILURE: Primary | Chronic | ICD-10-CM

## 2022-10-12 DIAGNOSIS — I71.20 THORACIC AORTIC ANEURYSM WITHOUT RUPTURE: ICD-10-CM

## 2022-10-12 DIAGNOSIS — I48.21 PERMANENT ATRIAL FIBRILLATION: Chronic | ICD-10-CM

## 2022-10-12 LAB
ASCENDING AORTA: 4.2 CM
BH CV ECHO MEAS - AO MAX PG: 13.3 MMHG
BH CV ECHO MEAS - AO MEAN PG: 9 MMHG
BH CV ECHO MEAS - AO ROOT AREA (BSA CORRECTED): 1.9 CM2
BH CV ECHO MEAS - AO ROOT DIAM: 4.5 CM
BH CV ECHO MEAS - AO V2 MAX: 195 CM/SEC
BH CV ECHO MEAS - AO V2 VTI: 38.2 CM
BH CV ECHO MEAS - AVA(I,D): 2.07 CM2
BH CV ECHO MEAS - EDV(CUBED): 154.9 ML
BH CV ECHO MEAS - EDV(MOD-SP2): 192 ML
BH CV ECHO MEAS - EDV(MOD-SP4): 210 ML
BH CV ECHO MEAS - EF(MOD-BP): 53 %
BH CV ECHO MEAS - EF(MOD-SP2): 55.2 %
BH CV ECHO MEAS - EF(MOD-SP4): 50 %
BH CV ECHO MEAS - ESV(CUBED): 49.8 ML
BH CV ECHO MEAS - ESV(MOD-SP2): 86 ML
BH CV ECHO MEAS - ESV(MOD-SP4): 105 ML
BH CV ECHO MEAS - FS: 31.5 %
BH CV ECHO MEAS - IVS/LVPW: 0.91 CM
BH CV ECHO MEAS - IVSD: 0.96 CM
BH CV ECHO MEAS - LA DIMENSION: 4.8 CM
BH CV ECHO MEAS - LAT PEAK E' VEL: 11.4 CM/SEC
BH CV ECHO MEAS - LV DIASTOLIC VOL/BSA (35-75): 85.9 CM2
BH CV ECHO MEAS - LV MASS(C)D: 205.9 GRAMS
BH CV ECHO MEAS - LV MAX PG: 4 MMHG
BH CV ECHO MEAS - LV MEAN PG: 2.5 MMHG
BH CV ECHO MEAS - LV SYSTOLIC VOL/BSA (12-30): 42.9 CM2
BH CV ECHO MEAS - LV V1 MAX: 100.1 CM/SEC
BH CV ECHO MEAS - LV V1 VTI: 20.8 CM
BH CV ECHO MEAS - LVIDD: 5.4 CM
BH CV ECHO MEAS - LVIDS: 3.7 CM
BH CV ECHO MEAS - LVOT AREA: 3.8 CM2
BH CV ECHO MEAS - LVOT DIAM: 2.2 CM
BH CV ECHO MEAS - LVPWD: 1.05 CM
BH CV ECHO MEAS - MED PEAK E' VEL: 9.65 CM/SEC
BH CV ECHO MEAS - MR MAX PG: 73.3 MMHG
BH CV ECHO MEAS - MR MAX VEL: 428 CM/SEC
BH CV ECHO MEAS - MR MEAN PG: 57 MMHG
BH CV ECHO MEAS - MR MEAN VEL: 369 CM/SEC
BH CV ECHO MEAS - MR VTI: 164 CM
BH CV ECHO MEAS - MV DEC SLOPE: 272 CM/SEC2
BH CV ECHO MEAS - MV DEC TIME: 0.2 MSEC
BH CV ECHO MEAS - MV E MAX VEL: 114 CM/SEC
BH CV ECHO MEAS - MV MAX PG: 4.7 MMHG
BH CV ECHO MEAS - MV MEAN PG: 1 MMHG
BH CV ECHO MEAS - MV P1/2T: 114.1 MSEC
BH CV ECHO MEAS - MV V2 VTI: 34.5 CM
BH CV ECHO MEAS - MVA(P1/2T): 1.93 CM2
BH CV ECHO MEAS - MVA(VTI): 2.29 CM2
BH CV ECHO MEAS - PA ACC SLOPE: 686 CM/SEC2
BH CV ECHO MEAS - PA ACC TIME: 0.13 SEC
BH CV ECHO MEAS - PA PR(ACCEL): 18.7 MMHG
BH CV ECHO MEAS - PA V2 MAX: 83.3 CM/SEC
BH CV ECHO MEAS - PI END-D VEL: 77.2 CM/SEC
BH CV ECHO MEAS - RAP SYSTOLE: 3 MMHG
BH CV ECHO MEAS - RVSP: 33 MMHG
BH CV ECHO MEAS - SI(MOD-SP2): 43.3 ML/M2
BH CV ECHO MEAS - SI(MOD-SP4): 42.9 ML/M2
BH CV ECHO MEAS - SV(LVOT): 79.1 ML
BH CV ECHO MEAS - SV(MOD-SP2): 106 ML
BH CV ECHO MEAS - SV(MOD-SP4): 105 ML
BH CV ECHO MEAS - TAPSE (>1.6): 1.4 CM
BH CV ECHO MEAS - TR MAX PG: 29.8 MMHG
BH CV ECHO MEAS - TR MAX VEL: 273 CM/SEC
BH CV ECHO MEASUREMENTS AVERAGE E/E' RATIO: 10.83
BH CV VAS BP LEFT ARM: NORMAL MMHG
BH CV XLRA - RV BASE: 5 CM
BH CV XLRA - RV LENGTH: 7.7 CM
BH CV XLRA - RV MID: 3.5 CM
BH CV XLRA - TDI S': 7.19 CM/SEC
LEFT ATRIUM VOLUME INDEX: 34.7 ML/M2
LV EF 2D ECHO EST: 52 %
MAXIMAL PREDICTED HEART RATE: 154 BPM
STRESS TARGET HR: 131 BPM

## 2022-10-12 PROCEDURE — 93306 TTE W/DOPPLER COMPLETE: CPT | Performed by: INTERNAL MEDICINE

## 2022-10-12 PROCEDURE — 99214 OFFICE O/P EST MOD 30 MIN: CPT | Performed by: INTERNAL MEDICINE

## 2022-10-12 PROCEDURE — 93306 TTE W/DOPPLER COMPLETE: CPT

## 2022-10-12 RX ORDER — AMOXICILLIN 500 MG/1
2000 CAPSULE ORAL SEE ADMIN INSTRUCTIONS
Qty: 12 CAPSULE | Refills: 0 | Status: SHIPPED | OUTPATIENT
Start: 2022-10-12

## 2022-10-12 RX ORDER — SACUBITRIL AND VALSARTAN 49; 51 MG/1; MG/1
1 TABLET, FILM COATED ORAL 2 TIMES DAILY
Qty: 180 TABLET | Refills: 3 | Status: SHIPPED | OUTPATIENT
Start: 2022-10-12

## 2022-10-12 NOTE — ASSESSMENT & PLAN NOTE
· Echo today shows normal functioning bioprosthetic aortic valve and ascending aortic dimension of 4.5 cm  · Continue low-dose aspirin  · Continue amoxicillin for SBE prophylaxis prior to dental cleanings  · Repeat echo in 2025

## 2022-10-12 NOTE — ASSESSMENT & PLAN NOTE
· Stage C HFrEF with normalized LV systolic function  · NYHA class II symptoms  · Change losartan to Entresto 49-51 mg twice daily  · If BP remains elevated, will start eplerenone 25 mg daily

## 2022-10-12 NOTE — ASSESSMENT & PLAN NOTE
· Not well controlled  · Change losartan to Entresto 49-51 mg twice daily  · Patient to monitor blood pressure at home  · If BP >130/80 mmHg, will add eplerenone   1 day

## 2022-10-12 NOTE — PROGRESS NOTES
[facility]      Identification: Yeison Bryant is a 67 y.o. male who resides in [place], [place]    Reason for visit:  · Chronic systolic congestive heart failure (6 month follow up)   · S/p Bentall procedure    Assessment     Problem List Items Addressed This Visit        Cardiology Problems    Chronic systolic congestive heart failure (HCC) - Primary (Chronic)    Overview     · Echo (6/15/2021): LVEF 42%.  · Echo (10/12/2022): LVEF 52%.  Normal functioning bioprosthetic aortic valve.  Ascending aorta 4.5 cm         Current Assessment & Plan     · Stage C HFrEF with normalized LV systolic function  · NYHA class II symptoms  · Change losartan to Entresto 49-51 mg twice daily  · If BP remains elevated, will start eplerenone 25 mg daily         Relevant Medications    sacubitril-valsartan (Entresto) 49-51 MG tablet    Other Relevant Orders    Basic Metabolic Panel    Permanent atrial fibrillation (HCC) (Chronic)    Overview     · Asymptomatic  · Diagnosed 1/18/21 at time of hospitalization in [place]  · CHADsVASC=2  · Echo (1/19/21): EF 56-60%, LA is mildly increased, RVSP 42mmHg, moderate dilation of the aortic root.  · MAZE procedure with ablation of left/right superior and inferior pulmonary veins (3/3/2021): Unable to clip CHARLOTTE as DIONISIO demonstrated a clot within the appendage.  · 30 day monitor (6/4/2021): 100% Afib controlled VR 96%, RVR 3%-asymptomatic.  · DIONISIO/ECV (7/9/2021): LVEF 45%. Moderate MR. Moderate to severe tricuspid valve regurgitation is present.         Current Assessment & Plan     · Asymptomatic  · Continue apixaban for stroke prophylaxis         Relevant Medications    sacubitril-valsartan (Entresto) 49-51 MG tablet    apixaban (ELIQUIS) 5 MG tablet tablet    Status post Bentall (Chronic)    Overview     · Previous 5.6cm ascending aortic aneurysm with bicuspid aortic valve stenosis  · Bentall with bioprosthetic tissue valve (#27 trifecta) by Thai Guerrier, 3/3/2021         Current  Assessment & Plan     · Echo today shows normal functioning bioprosthetic aortic valve and ascending aortic dimension of 4.5 cm  · Continue low-dose aspirin  · Continue amoxicillin for SBE prophylaxis prior to dental cleanings  · Repeat echo in 2025         Relevant Medications    amoxicillin (AMOXIL) 500 MG capsule    Essential hypertension    Overview     • Target blood pressure <130/80 mmHg         Current Assessment & Plan     · Not well controlled  · Change losartan to Entresto 49-51 mg twice daily  · Patient to monitor blood pressure at home  · If BP >130/80 mmHg, will add eplerenone         Relevant Medications    sacubitril-valsartan (Entresto) 49-51 MG tablet    Other Relevant Orders    Basic Metabolic Panel       Plan   • Discontinue losartan  • Start Entresto 49-51 mg twice daily  • BMP in 2-week  • Patient to monitor blood pressure at home.  If >130/80, will add eplerenone 25 mg      Follow-up   Return in about 1 year (around 10/12/2023).        Subjective      Yeison Bryant resents to cardiology clinic for routine follow-up with echo prior to his visit.  He has been doing well overall.  He states he does get short of breath when he is strenuously exerting himself in the gym.  This improves after about 30 seconds.  He states that he has not been checking his blood pressure routinely at home.  He thinks his measurements have been in the 140-150 mmHg range.    Prior to his visit, an echocardiogram was performed.  This showed his LV systolic function to be 52% (previously 42%).  The bioprosthetic aortic valve appears to function normally.  The aortic dimension measures 4.5 cm.    Review of Systems   Constitutional: Negative for malaise/fatigue.   Eyes: Negative for vision loss in left eye and vision loss in right eye.   Cardiovascular: Negative for chest pain, dyspnea on exertion, near-syncope, orthopnea, palpitations, paroxysmal nocturnal dyspnea and syncope.   Musculoskeletal: Negative for myalgias.  "  Neurological: Negative for brief paralysis, excessive daytime sleepiness, focal weakness, numbness, paresthesias and weakness.   All other systems reviewed and are negative.      Objective     /93 (BP Location: Right arm, Patient Position: Sitting, Cuff Size: Adult)   Pulse 71   Ht 188 cm (74\")   Wt 122 kg (268 lb)   SpO2 98%   BMI 34.41 kg/m²       Constitutional:       Appearance: Healthy appearance. Well-developed.   Eyes:      General: Lids are normal. No scleral icterus.  HENT:      Head: Normocephalic and atraumatic.   Neck:      Thyroid: No thyroid mass.      Vascular: No carotid bruit or JVD. JVD normal.   Pulmonary:      Effort: Pulmonary effort is normal.      Breath sounds: Normal breath sounds.   Cardiovascular:      Normal rate. Regular rhythm.      Murmurs: There is no murmur.      No gallop.   Musculoskeletal:      Extremities: No clubbing present.Skin:     General: Skin is warm and dry. There is no cyanosis.   Neurological:      General: No focal deficit present.      Mental Status: Alert.   Psychiatric:         Attention and Perception: Attention normal.         Behavior: Behavior normal. Behavior is cooperative.         Result Review  (reviewed with patient):    Echo (10/12/2022): LVEF 52%.  Normal functioning bioprosthetic aortic valve.  Ascending aortic dimension 4.5 cm              Marcial Peña MD, [facility], [facility]  10/12/2022  "

## 2023-02-23 ENCOUNTER — TELEPHONE (OUTPATIENT)
Dept: CARDIOLOGY | Facility: CLINIC | Age: 68
End: 2023-02-23
Payer: MEDICARE

## 2023-02-23 NOTE — TELEPHONE ENCOUNTER
[facility]th[facility] Surgery [facility] called to request cardiac clearance for an upcoming total hip replacement scheduled 5/25/2023 with Dr. Kaveh Gann.       Echo 10/12/2022- EF 52%. Patient is status post Bentall procedure.  Moderate dilation of the aortic root is present. The aortic root measures 4.5 cm.    OK to proceed, hold Eliquis 3 days and continue ASA?

## 2023-09-02 DIAGNOSIS — I50.22 CHRONIC SYSTOLIC CONGESTIVE HEART FAILURE: ICD-10-CM

## 2023-09-02 RX ORDER — CARVEDILOL 12.5 MG/1
TABLET ORAL
Qty: 180 TABLET | Refills: 3 | Status: SHIPPED | OUTPATIENT
Start: 2023-09-02

## 2023-10-23 NOTE — PROGRESS NOTES
"    Cardiology Outpatient Visit      Identification: Yeison Bryant is a 68 y.o. male who resides in [place], [place].     Reason for visit:  Status post Bentall  Heart failure with improved ejection fraction  Permanent atrial fibrillation      Subjective      Yeison returns to the office today for 1 year follow-up.  He has been doing well with no new cardiopulmonary complaints.  He has occasional sporadic chest pains lasting seconds in duration and not associated with exertion.  Denies orthopnea, PND.  No palpitations, TIA or stroke symptoms.    He had an echocardiogram performed last year for surveillance of his bioprosthetic aortic valve, which was not functioning normally.    Review of Systems   Cardiovascular:  Positive for dyspnea on exertion.   Respiratory: Negative.         No Known Allergies      Current Outpatient Medications   Medication Instructions    acetaminophen (TYLENOL) 650 mg, Oral, Every 6 Hours PRN    amoxicillin (AMOXIL) 2,000 mg, Oral, See Admin Instructions, 4 capsules 1 hour prior to procedure    apixaban (ELIQUIS) 5 mg, Oral, Every 12 Hours Scheduled    aspirin 81 mg, Oral, Daily    carvedilol (COREG) 12.5 mg, Oral, 2 Times Daily With Meals    colchicine 0.6 mg, Oral, As Needed    fluticasone (FLONASE) 50 MCG/ACT nasal spray 2 sprays, Nasal, Daily PRN    multivitamin with minerals tablet tablet 1 tablet, Oral, Daily    sacubitril-valsartan (Entresto) 49-51 MG tablet 1 tablet, Oral, 2 Times Daily         Objective     /88 (BP Location: Right arm, Patient Position: Sitting)   Pulse 78   Ht 188 cm (74\")   Wt 125 kg (275 lb 6.4 oz)   SpO2 98%   BMI 35.36 kg/m²       Constitutional:       Appearance: Healthy appearance. Obese.   Eyes:      General: No scleral icterus.  Neck:      Thyroid: No thyroid mass.      Vascular: No carotid bruit or JVD. JVD normal.   Pulmonary:      Effort: Pulmonary effort is normal.      Breath sounds: Normal breath sounds.   Cardiovascular:      Normal rate. " Regular rhythm.      Murmurs: There is no murmur.      No gallop.    Edema:     Peripheral edema absent.   Skin:     General: Skin is warm. There is no cyanosis.   Neurological:      General: No focal deficit present.      Mental Status: Alert.   Psychiatric:         Attention and Perception: Attention normal.         Result Review  (reviewed with patient):            Labs (4/12/2023):  WBC 17.2, RBC 4.18, HGB 13.5, HCT 39.9,   Glucose 155, creat 0.81, BUN 25, Na 135, K 4.2          Assessment     Diagnoses and all orders for this visit:    1. Status post Bentall (Primary)  Overview:  Previous 5.6cm ascending aortic aneurysm with bicuspid aortic valve stenosis  Bentall with bioprosthetic tissue valve (#27 trifecta) by Thai Guerrier, 3/3/2021  Echo (10/12/2022): LVEF 52%.  Normal functioning bioprosthetic aortic valve.  Ascending aorta 4.5 cm    Assessment & Plan:  Asymptomatic  Repeat echo in 2024  SBE prophylaxis prior to dental cleanings  Continue low-dose    Orders:  -     aspirin 81 MG EC tablet; Take 1 tablet by mouth Daily.    2. Heart failure with improved ejection fraction (HFimpEF)  Overview:  Echo (6/15/2021): LVEF 42%.  Echo (10/12/2022): LVEF 52%.  Normal functioning bioprosthetic aortic valve.  Ascending aorta 4.5 cm    Assessment & Plan:  Normalized LV systolic function on recent echo  Continue carvedilol, Entresto    Orders:  -     carvedilol (COREG) 12.5 MG tablet; Take 1 tablet by mouth 2 (Two) Times a Day With Meals.  Dispense: 180 tablet; Refill: 3  -     sacubitril-valsartan (Entresto) 49-51 MG tablet; Take 1 tablet by mouth 2 (Two) Times a Day.  Dispense: 180 tablet; Refill: 3    3. Permanent atrial fibrillation  Overview:  Asymptomatic  Diagnosed 1/18/21 at time of hospitalization in [place]  CHADsVASC=2  Echo (1/19/21): EF 56-60%, LA is mildly increased, RVSP 42mmHg, moderate dilation of the aortic root.  MAZE procedure with ablation of left/right superior and inferior pulmonary veins  (3/3/2021): Unable to clip CHARLOTTE as DIONISIO demonstrated a clot within the appendage.  30 day monitor (6/4/2021): 100% Afib controlled VR 96%, RVR 3%-asymptomatic.  DIONISIO/ECV (7/9/2021): LVEF 45%. Moderate MR. Moderate to severe tricuspid valve regurgitation is present.  Echo (10/12/2020): LVEF 52%.  Status post Bentall.  Bioprosthetic aortic valve functions normally.  Aortic root 4.5 cm    Assessment & Plan:  Asymptomatic for palpitations, TIA or stroke symptoms  Continue apixaban for stroke prophylaxis    Orders:  -     apixaban (ELIQUIS) 5 MG tablet tablet; Take 1 tablet by mouth Every 12 (Twelve) Hours. Indications: Atrial Fibrillation  Dispense: 180 tablet; Refill: 3    4. Essential hypertension  Overview:  Target blood pressure <130/80 mmHg    Assessment & Plan:  Well-controlled     Orders:  -     sacubitril-valsartan (Entresto) 49-51 MG tablet; Take 1 tablet by mouth 2 (Two) Times a Day.  Dispense: 180 tablet; Refill: 3          Plan   Continue present medical therapy  Repeat echo in 2024      Follow-up   Return in about 1 year (around 10/25/2024).        Marcial Peña MD, FACC, [facility]  10/25/2023

## 2023-10-25 ENCOUNTER — OFFICE VISIT (OUTPATIENT)
Dept: CARDIOLOGY | Facility: CLINIC | Age: 68
End: 2023-10-25
Payer: MEDICARE

## 2023-10-25 VITALS
SYSTOLIC BLOOD PRESSURE: 118 MMHG | WEIGHT: 275.4 LBS | OXYGEN SATURATION: 98 % | HEART RATE: 78 BPM | BODY MASS INDEX: 35.34 KG/M2 | HEIGHT: 74 IN | DIASTOLIC BLOOD PRESSURE: 88 MMHG

## 2023-10-25 DIAGNOSIS — I50.22 HEART FAILURE WITH IMPROVED EJECTION FRACTION (HFIMPEF): ICD-10-CM

## 2023-10-25 DIAGNOSIS — I48.21 PERMANENT ATRIAL FIBRILLATION: Chronic | ICD-10-CM

## 2023-10-25 DIAGNOSIS — I71.20 THORACIC AORTIC ANEURYSM WITHOUT RUPTURE: Primary | ICD-10-CM

## 2023-10-25 DIAGNOSIS — I10 ESSENTIAL HYPERTENSION: ICD-10-CM

## 2023-10-25 PROBLEM — I50.32 HEART FAILURE WITH IMPROVED EJECTION FRACTION (HFIMPEF): Status: ACTIVE | Noted: 2021-03-03

## 2023-10-25 PROBLEM — Z87.891 FORMER SMOKER: Chronic | Status: RESOLVED | Noted: 2021-01-18 | Resolved: 2023-10-25

## 2023-10-25 RX ORDER — SACUBITRIL AND VALSARTAN 49; 51 MG/1; MG/1
1 TABLET, FILM COATED ORAL 2 TIMES DAILY
Qty: 180 TABLET | Refills: 3 | Status: SHIPPED | OUTPATIENT
Start: 2023-10-25

## 2023-10-25 RX ORDER — COLCHICINE 0.6 MG/1
0.6 TABLET ORAL AS NEEDED
COMMUNITY

## 2023-10-25 RX ORDER — CARVEDILOL 12.5 MG/1
12.5 TABLET ORAL 2 TIMES DAILY WITH MEALS
Qty: 180 TABLET | Refills: 3 | Status: SHIPPED | OUTPATIENT
Start: 2023-10-25

## 2023-10-25 RX ORDER — ASPIRIN 81 MG/1
81 TABLET ORAL DAILY
Start: 2023-10-25

## 2023-10-25 NOTE — LETTER
October 25, 2023     Samuel Duane Kreis, MD  [address] [place] [address]    Patient: Yeison Bryant   YOB: 1955   Date of Visit: 10/25/2023     Dear Samuel Duane Kreis, MD:       Thank you for referring Yeison Bryant to me for evaluation. Below are the relevant portions of my assessment and plan of care.    If you have questions, please do not hesitate to call me. I look forward to following Yeison along with you.         Sincerely,        Vic Peña IV, MD        CC: No Recipients    Vic Peña IV, MD  10/25/23 0942  Signed      Cardiology Outpatient Visit      Identification: Yeison Bryant is a 68 y.o. male who resides in [place], [place].     Reason for visit:  Status post Bentall  Heart failure with improved ejection fraction  Permanent atrial fibrillation      Subjective     Yeison returns to the office today for 1 year follow-up.  He has been doing well with no new cardiopulmonary complaints.  He has occasional sporadic chest pains lasting seconds in duration and not associated with exertion.  Denies orthopnea, PND.  No palpitations, TIA or stroke symptoms.    He had an echocardiogram performed last year for surveillance of his bioprosthetic aortic valve, which was not functioning normally.    Review of Systems   Cardiovascular:  Positive for dyspnea on exertion.   Respiratory: Negative.         No Known Allergies      Current Outpatient Medications   Medication Instructions   • acetaminophen (TYLENOL) 650 mg, Oral, Every 6 Hours PRN   • amoxicillin (AMOXIL) 2,000 mg, Oral, See Admin Instructions, 4 capsules 1 hour prior to procedure   • apixaban (ELIQUIS) 5 mg, Oral, Every 12 Hours Scheduled   • aspirin 81 mg, Oral, Daily   • carvedilol (COREG) 12.5 mg, Oral, 2 Times Daily With Meals   • colchicine 0.6 mg, Oral, As Needed   • fluticasone (FLONASE) 50 MCG/ACT nasal spray 2 sprays, Nasal, Daily PRN   • multivitamin with minerals tablet tablet 1 tablet, Oral,  "Daily   • sacubitril-valsartan (Entresto) 49-51 MG tablet 1 tablet, Oral, 2 Times Daily         Objective    /88 (BP Location: Right arm, Patient Position: Sitting)   Pulse 78   Ht 188 cm (74\")   Wt 125 kg (275 lb 6.4 oz)   SpO2 98%   BMI 35.36 kg/m²       Constitutional:       Appearance: Healthy appearance. Obese.   Eyes:      General: No scleral icterus.  Neck:      Thyroid: No thyroid mass.      Vascular: No carotid bruit or JVD. JVD normal.   Pulmonary:      Effort: Pulmonary effort is normal.      Breath sounds: Normal breath sounds.   Cardiovascular:      Normal rate. Regular rhythm.      Murmurs: There is no murmur.      No gallop.    Edema:     Peripheral edema absent.   Skin:     General: Skin is warm. There is no cyanosis.   Neurological:      General: No focal deficit present.      Mental Status: Alert.   Psychiatric:         Attention and Perception: Attention normal.         Result Review (reviewed with patient):            Labs (4/12/2023):  WBC 17.2, RBC 4.18, HGB 13.5, HCT 39.9,   Glucose 155, creat 0.81, BUN 25, Na 135, K 4.2          Assessment    Diagnoses and all orders for this visit:    1. Status post Bentall (Primary)  Overview:  Previous 5.6cm ascending aortic aneurysm with bicuspid aortic valve stenosis  Bentall with bioprosthetic tissue valve (#27 trifecta) by Thai Guerrier, 3/3/2021  Echo (10/12/2022): LVEF 52%.  Normal functioning bioprosthetic aortic valve.  Ascending aorta 4.5 cm    Assessment & Plan:  Asymptomatic  Repeat echo in 2024  SBE prophylaxis prior to dental cleanings  Continue low-dose    Orders:  -     aspirin 81 MG EC tablet; Take 1 tablet by mouth Daily.    2. Heart failure with improved ejection fraction (HFimpEF)  Overview:  Echo (6/15/2021): LVEF 42%.  Echo (10/12/2022): LVEF 52%.  Normal functioning bioprosthetic aortic valve.  Ascending aorta 4.5 cm    Assessment & Plan:  Normalized LV systolic function on recent echo  Continue carvedilol, " Entresto    Orders:  -     carvedilol (COREG) 12.5 MG tablet; Take 1 tablet by mouth 2 (Two) Times a Day With Meals.  Dispense: 180 tablet; Refill: 3  -     sacubitril-valsartan (Entresto) 49-51 MG tablet; Take 1 tablet by mouth 2 (Two) Times a Day.  Dispense: 180 tablet; Refill: 3    3. Permanent atrial fibrillation  Overview:  Asymptomatic  Diagnosed 1/18/21 at time of hospitalization in [place]  CHADsVASC=2  Echo (1/19/21): EF 56-60%, LA is mildly increased, RVSP 42mmHg, moderate dilation of the aortic root.  MAZE procedure with ablation of left/right superior and inferior pulmonary veins (3/3/2021): Unable to clip CHARLOTTE as DIONISIO demonstrated a clot within the appendage.  30 day monitor (6/4/2021): 100% Afib controlled VR 96%, RVR 3%-asymptomatic.  DIONISIO/ECV (7/9/2021): LVEF 45%. Moderate MR. Moderate to severe tricuspid valve regurgitation is present.  Echo (10/12/2020): LVEF 52%.  Status post Bentall.  Bioprosthetic aortic valve functions normally.  Aortic root 4.5 cm    Assessment & Plan:  Asymptomatic for palpitations, TIA or stroke symptoms  Continue apixaban for stroke prophylaxis    Orders:  -     apixaban (ELIQUIS) 5 MG tablet tablet; Take 1 tablet by mouth Every 12 (Twelve) Hours. Indications: Atrial Fibrillation  Dispense: 180 tablet; Refill: 3    4. Essential hypertension  Overview:  Target blood pressure <130/80 mmHg    Assessment & Plan:  Well-controlled     Orders:  -     sacubitril-valsartan (Entresto) 49-51 MG tablet; Take 1 tablet by mouth 2 (Two) Times a Day.  Dispense: 180 tablet; Refill: 3          Plan  Continue present medical therapy  Repeat echo in 2024      Follow-up   Return in about 1 year (around 10/25/2024).        Marcial Peña MD, [facility], [facility]  10/25/2023

## 2023-11-04 DIAGNOSIS — I50.22 HEART FAILURE WITH IMPROVED EJECTION FRACTION (HFIMPEF): ICD-10-CM

## 2023-11-04 DIAGNOSIS — I10 ESSENTIAL HYPERTENSION: ICD-10-CM

## 2023-11-06 RX ORDER — SACUBITRIL AND VALSARTAN 49; 51 MG/1; MG/1
1 TABLET, FILM COATED ORAL 2 TIMES DAILY
Qty: 180 TABLET | Refills: 3 | Status: SHIPPED | OUTPATIENT
Start: 2023-11-06

## 2024-07-09 ENCOUNTER — TELEPHONE (OUTPATIENT)
Dept: CARDIOLOGY | Facility: CLINIC | Age: 69
End: 2024-07-09
Payer: MEDICARE

## 2024-07-09 NOTE — LETTER
07/10/24      Fax # - [phone]       Re: Yeison MCCANN Manny, 1955   Procedure/Surgery - total knee replacement          Yeison MCCANN Manny, 1955 may proceed with a scheduled procedure/surgery from a cardiac/EP standpoint.  Any questions please call [phone].    [x]  Continue Aspirin 81 mg daily    [x]  Hold Eliquis  72 hours              Sincerely,          Vic Peña IV, MD

## 2024-07-09 NOTE — TELEPHONE ENCOUNTER
[facility] is requesting cardiac clearance for an upcoming total knee replacement scheduled in September. He is on Eliquis and ASA.     Echo 10/12/2022- EF 52%. Patient is status post Bentall procedure. Moderate dilation of the aortic root is present. The aortic root measures 4.5 cm.

## 2024-08-13 NOTE — ASSESSMENT & PLAN NOTE
· Stage C HFrEF with stable NYHA class II symptoms  · Continue carvedilol and losartan  · Obtain echocardiogram at next office visit to reassess LVEF   Spouse/Significant other

## 2024-08-25 DIAGNOSIS — I50.32 HEART FAILURE WITH IMPROVED EJECTION FRACTION (HFIMPEF): ICD-10-CM

## 2024-08-26 RX ORDER — CARVEDILOL 12.5 MG/1
12.5 TABLET ORAL 2 TIMES DAILY WITH MEALS
Qty: 180 TABLET | Refills: 3 | Status: SHIPPED | OUTPATIENT
Start: 2024-08-26

## 2024-10-24 PROBLEM — Z95.3 HISTORY OF AORTIC VALVE REPLACEMENT WITH BIOPROSTHETIC VALVE: Status: ACTIVE | Noted: 2024-10-24

## 2024-10-24 NOTE — PROGRESS NOTES
Cardiology Outpatient Visit      Identification: Yeison Bryant is a 69 y.o. male who resides in [place], [place]    Reason for visit:  Status post Bentall (Pt states he has shortness of breath lately in a more common occurrence ./)      Subjective      Patient is a 69-year-old gentleman who returns today for follow-up of his permanent atrial fibrillation, heart failure, bioprosthetic aortic valve/Bentall procedure and cardiac risk factors.  Since his last visit he has been having knee injections.  He is requesting to have cardiac clearance to undergo a left knee arthroplasty with Dr. Gann.  He reports liset COVID a couple of months ago while in [place].  He did have some chest tightness at that time.  Once he recovered from COVID he has not had any chest pain or shortness of breath.  He overall is doing well except his mobility limitations from knee problems.  He anticipates after having the left knee surgery and recovering he will end up having right knee surgery as well.  He has a bioprosthetic aortic valve and Bentyl procedure that was performed in 2021.  His last echo in 2022 showed normal functioning bioprosthetic aortic valve with mildly dilated ascending aorta 4.5 cm.  His LVEF at that time was normal at 52%.  He has permanent atrial fibrillation and is in rate controlled atrial fibrillation today for which she is asymptomatic.  He is anticoagulated with Eliquis and tolerating without reports of active or overt bleeding.  He denies signs or symptoms TIA or CVA.        Review of Systems   Constitutional: Negative for malaise/fatigue.   Eyes:  Negative for vision loss in left eye and vision loss in right eye.   Cardiovascular:  Negative for chest pain, dyspnea on exertion, near-syncope, orthopnea, palpitations, paroxysmal nocturnal dyspnea and syncope.   Musculoskeletal:  Positive for joint pain. Negative for myalgias.        Bilateral knee discomfort   Neurological:  Negative for brief  "paralysis, excessive daytime sleepiness, focal weakness, numbness, paresthesias and weakness.   All other systems reviewed and are negative.      No Known Allergies      Current Outpatient Medications   Medication Instructions    acetaminophen (TYLENOL) 650 mg, Oral, Every 6 Hours PRN    amoxicillin (AMOXIL) 2,000 mg, Oral, See Admin Instructions, 4 capsules 1 hour prior to procedure    apixaban (ELIQUIS) 5 mg, Oral, Every 12 Hours Scheduled    aspirin 81 mg, Oral, Daily    carvedilol (COREG) 12.5 mg, Oral, 2 Times Daily With Meals    colchicine 0.6 mg, As Needed    fluticasone (FLONASE) 50 MCG/ACT nasal spray 2 sprays, Daily PRN    multivitamin with minerals tablet tablet 1 tablet, Daily    sacubitril-valsartan (Entresto) 49-51 MG tablet 1 tablet, Oral, 2 Times Daily         Objective     /62 (BP Location: Left arm, Patient Position: Sitting, Cuff Size: Adult)   Pulse 66   Ht 188 cm (74.02\")   Wt 130 kg (286 lb)   SpO2 98%   BMI 36.70 kg/m²       Constitutional:       Appearance: Healthy appearance. Well-developed.   Eyes:      General: Lids are normal. No scleral icterus.     Conjunctiva/sclera: Conjunctivae normal.   HENT:      Head: Normocephalic and atraumatic.   Neck:      Thyroid: No thyromegaly.      Vascular: No carotid bruit or JVD.   Pulmonary:      Effort: Pulmonary effort is normal.      Breath sounds: Normal breath sounds. No wheezing. No rhonchi. No rales.   Cardiovascular:      Normal rate. Regular rhythm.      Murmurs: There is no murmur.      No gallop.  No rub.   Pulses:     Intact distal pulses.   Edema:     Peripheral edema absent.   Abdominal:      General: There is no distension.      Palpations: Abdomen is soft. There is no abdominal mass.   Musculoskeletal:      Cervical back: Normal range of motion. Skin:     General: Skin is warm and dry.      Findings: No rash.   Neurological:      General: No focal deficit present.      Mental Status: Alert and oriented to person, place, and " time.      Gait: Gait is intact.   Psychiatric:         Attention and Perception: Attention normal.         Mood and Affect: Mood normal.         Behavior: Behavior normal.         Result Review  (reviewed with patient):        ECG 12 Lead    Date/Time: 10/29/2024 10:04 AM  Performed by: Cheyenne Churchill APRN    Authorized by: Cheyenne Churchill APRN  Comparison: compared with previous ECG from 3/15/2022  Similar to previous ECG  Rhythm: atrial fibrillation  BPM: 86    Clinical impression: abnormal EKG  Comments: QT/QTc 396/473 MS           Lab Results   Component Value Date    GLUCOSE 127 (H) 07/09/2021    BUN 21 07/09/2021    CREATININE 0.79 07/09/2021     07/09/2021    K 4.8 07/09/2021     07/09/2021    CALCIUM 9.3 07/09/2021    PROTEINTOT 6.8 07/09/2021    ALBUMIN 4.60 07/09/2021    ALT 18 07/09/2021    AST 22 07/09/2021    ALKPHOS 143 (H) 07/09/2021    BILITOT 1.3 (H) 07/09/2021    GLOB 2.2 07/09/2021    AGRATIO 2.1 07/09/2021    BCR 26.6 (H) 07/09/2021    ANIONGAP 10.0 07/09/2021     Lab Results   Component Value Date    WBC 17.21 (H) 04/12/2023    HGB 13.5 (L) 04/12/2023    HCT 39.9 (L) 04/12/2023    MCV 96 04/12/2023     04/12/2023     Lab Results   Component Value Date    CHOL 181 01/19/2021    TRIG 124 01/19/2021    HDL 56 01/19/2021     (H) 01/19/2021     Lab Results   Component Value Date    HGBA1C 5.50 05/05/2021           Assessment     Diagnoses and all orders for this visit:    1. Heart failure with improved ejection fraction (HFimpEF) (Primary)  Overview:  Echo (6/15/2021): LVEF 42%.  Echo (10/12/2022): LVEF 52%.  Normal functioning bioprosthetic aortic valve.  Ascending aorta 4.5 cm    Assessment & Plan:  Stable NYHA class I symptoms  Continue carvedilol and Entresto    Orders:  -     sacubitril-valsartan (Entresto) 49-51 MG tablet; Take 1 tablet by mouth 2 (Two) Times a Day.  Dispense: 180 tablet; Refill: 3  -     carvedilol (COREG) 12.5 MG tablet; Take 1 tablet by  mouth 2 (Two) Times a Day With Meals.  Dispense: 180 tablet; Refill: 3    2. Permanent atrial fibrillation  Overview:  Asymptomatic  Diagnosed 1/18/21 at time of hospitalization in [place]  CHADsVASC=2  Echo (1/19/21): EF 56-60%, LA is mildly increased, RVSP 42mmHg, moderate dilation of the aortic root.  MAZE procedure with ablation of left/right superior and inferior pulmonary veins (3/3/2021): Unable to clip CHARLOTTE as DIONISIO demonstrated a clot within the appendage.  30 day monitor (6/4/2021): 100% Afib controlled VR 96%, RVR 3%-asymptomatic.  DIONISIO/ECV (7/9/2021): LVEF 45%. Moderate MR. Moderate to severe tricuspid valve regurgitation is present.  Echo (10/12/2020): LVEF 52%.  Status post Bentall.  Bioprosthetic aortic valve functions normally.  Aortic root 4.5 cm    Assessment & Plan:  Continue Eliquis 5 mg twice daily for stroke prophylaxis    Orders:  -     CBC & Differential; Future  -     apixaban (ELIQUIS) 5 MG tablet tablet; Take 1 tablet by mouth Every 12 (Twelve) Hours. Indications: Atrial Fibrillation  Dispense: 180 tablet; Refill: 3    3. Aneurysm of ascending aorta without rupture  Overview:  Previous 5.6cm ascending aortic aneurysm with bicuspid aortic valve stenosis  Bentall with bioprosthetic tissue valve (#27 trifecta) by Thai Guerrier, 3/3/2021  Echo (10/12/2022): LVEF 52%.  Normal functioning bioprosthetic aortic valve.  Ascending aorta 4.5 cm    Assessment & Plan:  Repeat echocardiogram  SBE prophylaxis prior to dental cleanings  Continue low-dose aspirin    Orders:  -     Adult Transthoracic Echo Complete W/ Cont if Necessary Per Protocol; Future    4. History of aortic valve replacement with bioprosthetic valve  Overview:  Previous 5.6cm ascending aortic aneurysm with bicuspid aortic valve stenosis  Bentall with bioprosthetic tissue valve (#27 trifecta) by Thai Guerrier, 3/3/2021  Echo (10/12/2022): LVEF 52%.  Normal functioning bioprosthetic aortic valve.  Ascending aorta 4.5 c    Assessment &  Plan:  Repeat echocardiogram for surveillance of AVR/Bentall  If echo within normal limits we will provide clearance to proceed with left knee surgery with Dr. Gann    Orders:  -     Adult Transthoracic Echo Complete W/ Cont if Necessary Per Protocol; Future    5. Essential hypertension  Overview:  Target blood pressure <130/80 mmHg    Assessment & Plan:  Continue carvedilol 12.5 mg twice daily  Continue Entresto 49/51 mg 1 tablet twice daily    Orders:  -     sacubitril-valsartan (Entresto) 49-51 MG tablet; Take 1 tablet by mouth 2 (Two) Times a Day.  Dispense: 180 tablet; Refill: 3    6. Mixed hyperlipidemia  Assessment & Plan:   Continue    Orders:  -     Lipid Panel; Future  -     Comprehensive Metabolic Panel; Future    Other orders  -     ECG 12 Lead          Plan   Will obtain echocardi[facility] at [facility] in the next couple of weeks.  If echo okay patient would be of acceptable cardiovascular risk to proceed with knee surgery with Dr. Gann  Prior to surgery patient should hold Eliquis for 72 hours and resume postprocedure.  He should not hold his aspirin  Obtain CMP and lipid profile today  Continue current medications      Follow-up   Return in about 1 year (around 10/29/2025), or if symptoms worsen or fail to improve, for Follow-up with Dr. Peña next visit.      Cheyenne Churchill, DORIS  10/29/2024

## 2024-10-29 ENCOUNTER — OFFICE VISIT (OUTPATIENT)
Dept: CARDIOLOGY | Facility: CLINIC | Age: 69
End: 2024-10-29
Payer: MEDICARE

## 2024-10-29 ENCOUNTER — LAB (OUTPATIENT)
Dept: LAB | Facility: HOSPITAL | Age: 69
End: 2024-10-29
Payer: MEDICARE

## 2024-10-29 VITALS
DIASTOLIC BLOOD PRESSURE: 62 MMHG | WEIGHT: 286 LBS | SYSTOLIC BLOOD PRESSURE: 102 MMHG | HEART RATE: 66 BPM | OXYGEN SATURATION: 98 % | BODY MASS INDEX: 36.7 KG/M2 | HEIGHT: 74 IN

## 2024-10-29 DIAGNOSIS — I71.21 ANEURYSM OF ASCENDING AORTA WITHOUT RUPTURE: Chronic | ICD-10-CM

## 2024-10-29 DIAGNOSIS — E78.2 MIXED HYPERLIPIDEMIA: ICD-10-CM

## 2024-10-29 DIAGNOSIS — I10 ESSENTIAL HYPERTENSION: ICD-10-CM

## 2024-10-29 DIAGNOSIS — I48.21 PERMANENT ATRIAL FIBRILLATION: Chronic | ICD-10-CM

## 2024-10-29 DIAGNOSIS — I50.32 HEART FAILURE WITH IMPROVED EJECTION FRACTION (HFIMPEF): Primary | ICD-10-CM

## 2024-10-29 DIAGNOSIS — I48.21 PERMANENT ATRIAL FIBRILLATION: ICD-10-CM

## 2024-10-29 DIAGNOSIS — Z95.3 HISTORY OF AORTIC VALVE REPLACEMENT WITH BIOPROSTHETIC VALVE: ICD-10-CM

## 2024-10-29 LAB
ALBUMIN SERPL-MCNC: 4.3 G/DL (ref 3.5–5.2)
ALBUMIN/GLOB SERPL: 1.6 G/DL
ALP SERPL-CCNC: 93 U/L (ref 39–117)
ALT SERPL W P-5'-P-CCNC: 69 U/L (ref 1–41)
ANION GAP SERPL CALCULATED.3IONS-SCNC: 7.6 MMOL/L (ref 5–15)
AST SERPL-CCNC: 68 U/L (ref 1–40)
BASOPHILS # BLD AUTO: 0.04 10*3/MM3 (ref 0–0.2)
BASOPHILS NFR BLD AUTO: 0.5 % (ref 0–1.5)
BILIRUB SERPL-MCNC: 0.9 MG/DL (ref 0–1.2)
BUN SERPL-MCNC: 18 MG/DL (ref 8–23)
BUN/CREAT SERPL: 19.4 (ref 7–25)
CALCIUM SPEC-SCNC: 9.4 MG/DL (ref 8.6–10.5)
CHLORIDE SERPL-SCNC: 105 MMOL/L (ref 98–107)
CHOLEST SERPL-MCNC: 175 MG/DL (ref 0–200)
CO2 SERPL-SCNC: 26.4 MMOL/L (ref 22–29)
CREAT SERPL-MCNC: 0.93 MG/DL (ref 0.76–1.27)
DEPRECATED RDW RBC AUTO: 41.2 FL (ref 37–54)
EGFRCR SERPLBLD CKD-EPI 2021: 89.4 ML/MIN/1.73
EOSINOPHIL # BLD AUTO: 0.19 10*3/MM3 (ref 0–0.4)
EOSINOPHIL NFR BLD AUTO: 2.6 % (ref 0.3–6.2)
ERYTHROCYTE [DISTWIDTH] IN BLOOD BY AUTOMATED COUNT: 11.8 % (ref 12.3–15.4)
GLOBULIN UR ELPH-MCNC: 2.7 GM/DL
GLUCOSE SERPL-MCNC: 133 MG/DL (ref 65–99)
HCT VFR BLD AUTO: 46.6 % (ref 37.5–51)
HDLC SERPL-MCNC: 47 MG/DL (ref 40–60)
HGB BLD-MCNC: 16.2 G/DL (ref 13–17.7)
IMM GRANULOCYTES # BLD AUTO: 0.03 10*3/MM3 (ref 0–0.05)
IMM GRANULOCYTES NFR BLD AUTO: 0.4 % (ref 0–0.5)
LDLC SERPL CALC-MCNC: 103 MG/DL (ref 0–100)
LDLC/HDLC SERPL: 2.13 {RATIO}
LYMPHOCYTES # BLD AUTO: 2.05 10*3/MM3 (ref 0.7–3.1)
LYMPHOCYTES NFR BLD AUTO: 27.8 % (ref 19.6–45.3)
MCH RBC QN AUTO: 32.9 PG (ref 26.6–33)
MCHC RBC AUTO-ENTMCNC: 34.8 G/DL (ref 31.5–35.7)
MCV RBC AUTO: 94.5 FL (ref 79–97)
MONOCYTES # BLD AUTO: 0.67 10*3/MM3 (ref 0.1–0.9)
MONOCYTES NFR BLD AUTO: 9.1 % (ref 5–12)
NEUTROPHILS NFR BLD AUTO: 4.4 10*3/MM3 (ref 1.7–7)
NEUTROPHILS NFR BLD AUTO: 59.6 % (ref 42.7–76)
NRBC BLD AUTO-RTO: 0 /100 WBC (ref 0–0.2)
PLATELET # BLD AUTO: 215 10*3/MM3 (ref 140–450)
PMV BLD AUTO: 9.8 FL (ref 6–12)
POTASSIUM SERPL-SCNC: 4.4 MMOL/L (ref 3.5–5.2)
PROT SERPL-MCNC: 7 G/DL (ref 6–8.5)
RBC # BLD AUTO: 4.93 10*6/MM3 (ref 4.14–5.8)
SODIUM SERPL-SCNC: 139 MMOL/L (ref 136–145)
TRIGL SERPL-MCNC: 140 MG/DL (ref 0–150)
VLDLC SERPL-MCNC: 25 MG/DL (ref 5–40)
WBC NRBC COR # BLD AUTO: 7.38 10*3/MM3 (ref 3.4–10.8)

## 2024-10-29 PROCEDURE — 3078F DIAST BP <80 MM HG: CPT | Performed by: NURSE PRACTITIONER

## 2024-10-29 PROCEDURE — 1159F MED LIST DOCD IN RCRD: CPT | Performed by: NURSE PRACTITIONER

## 2024-10-29 PROCEDURE — 99214 OFFICE O/P EST MOD 30 MIN: CPT | Performed by: NURSE PRACTITIONER

## 2024-10-29 PROCEDURE — 36415 COLL VENOUS BLD VENIPUNCTURE: CPT

## 2024-10-29 PROCEDURE — 80053 COMPREHEN METABOLIC PANEL: CPT

## 2024-10-29 PROCEDURE — 80061 LIPID PANEL: CPT

## 2024-10-29 PROCEDURE — 3074F SYST BP LT 130 MM HG: CPT | Performed by: NURSE PRACTITIONER

## 2024-10-29 PROCEDURE — 93000 ELECTROCARDIOGRAM COMPLETE: CPT | Performed by: NURSE PRACTITIONER

## 2024-10-29 PROCEDURE — 1160F RVW MEDS BY RX/DR IN RCRD: CPT | Performed by: NURSE PRACTITIONER

## 2024-10-29 PROCEDURE — 85025 COMPLETE CBC W/AUTO DIFF WBC: CPT

## 2024-10-29 RX ORDER — CARVEDILOL 12.5 MG/1
12.5 TABLET ORAL 2 TIMES DAILY WITH MEALS
Qty: 180 TABLET | Refills: 3 | Status: SHIPPED | OUTPATIENT
Start: 2024-10-29

## 2024-10-29 RX ORDER — SACUBITRIL AND VALSARTAN 49; 51 MG/1; MG/1
1 TABLET, FILM COATED ORAL 2 TIMES DAILY
Qty: 180 TABLET | Refills: 3 | Status: SHIPPED | OUTPATIENT
Start: 2024-10-29

## 2024-10-29 NOTE — ASSESSMENT & PLAN NOTE
Repeat echocardiogram for surveillance of AVR/Bentall  If echo within normal limits we will provide clearance to proceed with left knee surgery with Dr. Gann

## 2024-11-18 ENCOUNTER — HOSPITAL ENCOUNTER (OUTPATIENT)
Dept: CARDIOLOGY | Facility: HOSPITAL | Age: 69
Discharge: HOME | End: 2024-11-18
Admitting: NURSE PRACTITIONER
Payer: MEDICARE

## 2024-11-18 VITALS — WEIGHT: 286.6 LBS | HEIGHT: 74 IN | BODY MASS INDEX: 36.78 KG/M2

## 2024-11-18 DIAGNOSIS — Z95.3 HISTORY OF AORTIC VALVE REPLACEMENT WITH BIOPROSTHETIC VALVE: ICD-10-CM

## 2024-11-18 DIAGNOSIS — I71.21 ANEURYSM OF ASCENDING AORTA WITHOUT RUPTURE: Chronic | ICD-10-CM

## 2024-11-18 PROCEDURE — 93306 TTE W/DOPPLER COMPLETE: CPT

## 2024-11-18 PROCEDURE — 93306 TTE W/DOPPLER COMPLETE: CPT | Performed by: INTERNAL MEDICINE

## 2024-11-19 LAB
BH CV ECHO MEAS - AO MAX PG: 13.6 MMHG
BH CV ECHO MEAS - AO MEAN PG: 7.2 MMHG
BH CV ECHO MEAS - AO ROOT DIAM: 4.3 CM
BH CV ECHO MEAS - AO V2 MAX: 183.8 CM/SEC
BH CV ECHO MEAS - AO V2 VTI: 31.2 CM
BH CV ECHO MEAS - AVA(I,D): 1.43 CM2
BH CV ECHO MEAS - EDV(CUBED): 59.3 ML
BH CV ECHO MEAS - EDV(MOD-SP2): 121 ML
BH CV ECHO MEAS - EDV(MOD-SP4): 118 ML
BH CV ECHO MEAS - EF(MOD-BP): 52.4 %
BH CV ECHO MEAS - EF(MOD-SP2): 54.2 %
BH CV ECHO MEAS - EF(MOD-SP4): 51.8 %
BH CV ECHO MEAS - ESV(CUBED): 20.9 ML
BH CV ECHO MEAS - ESV(MOD-SP2): 55.4 ML
BH CV ECHO MEAS - ESV(MOD-SP4): 56.9 ML
BH CV ECHO MEAS - FS: 29.4 %
BH CV ECHO MEAS - IVS/LVPW: 1 CM
BH CV ECHO MEAS - IVSD: 1.3 CM
BH CV ECHO MEAS - LA DIMENSION: 4.4 CM
BH CV ECHO MEAS - LAT PEAK E' VEL: 16.4 CM/SEC
BH CV ECHO MEAS - LV DIASTOLIC VOL/BSA (35-75): 46.6 CM2
BH CV ECHO MEAS - LV MASS(C)D: 179.7 GRAMS
BH CV ECHO MEAS - LV MAX PG: 3.4 MMHG
BH CV ECHO MEAS - LV MEAN PG: 1.67 MMHG
BH CV ECHO MEAS - LV SYSTOLIC VOL/BSA (12-30): 22.5 CM2
BH CV ECHO MEAS - LV V1 MAX: 91.9 CM/SEC
BH CV ECHO MEAS - LV V1 VTI: 14.8 CM
BH CV ECHO MEAS - LVIDD: 3.9 CM
BH CV ECHO MEAS - LVIDS: 2.8 CM
BH CV ECHO MEAS - LVOT AREA: 3 CM2
BH CV ECHO MEAS - LVOT DIAM: 1.96 CM
BH CV ECHO MEAS - LVPWD: 1.3 CM
BH CV ECHO MEAS - MED PEAK E' VEL: 12.3 CM/SEC
BH CV ECHO MEAS - MR MAX PG: 27.5 MMHG
BH CV ECHO MEAS - MR MAX VEL: 262.4 CM/SEC
BH CV ECHO MEAS - MV MAX PG: 3.8 MMHG
BH CV ECHO MEAS - MV MEAN PG: 1.4 MMHG
BH CV ECHO MEAS - MV V2 VTI: 19.9 CM
BH CV ECHO MEAS - MVA(VTI): 2.24 CM2
BH CV ECHO MEAS - PA ACC TIME: 0.09 SEC
BH CV ECHO MEAS - PA V2 MAX: 134.4 CM/SEC
BH CV ECHO MEAS - PI END-D VEL: 59.7 CM/SEC
BH CV ECHO MEAS - RAP SYSTOLE: 3 MMHG
BH CV ECHO MEAS - RVSP: 28 MMHG
BH CV ECHO MEAS - SV(LVOT): 44.5 ML
BH CV ECHO MEAS - SV(MOD-SP2): 65.6 ML
BH CV ECHO MEAS - SV(MOD-SP4): 61.1 ML
BH CV ECHO MEAS - SVI(LVOT): 17.6 ML/M2
BH CV ECHO MEAS - SVI(MOD-SP2): 25.9 ML/M2
BH CV ECHO MEAS - SVI(MOD-SP4): 24.2 ML/M2
BH CV ECHO MEAS - TAPSE (>1.6): 1.29 CM
BH CV ECHO MEAS - TR MAX PG: 25 MMHG
BH CV ECHO MEAS - TR MAX VEL: 246.1 CM/SEC
BH CV XLRA - RV BASE: 4.4 CM
BH CV XLRA - RV LENGTH: 8.1 CM
BH CV XLRA - RV MID: 3.8 CM
BH CV XLRA - TDI S': 8.9 CM/SEC
LEFT ATRIUM VOLUME INDEX: 41 ML/M2
LV EF 2D ECHO EST: 55 %

## 2024-11-20 ENCOUNTER — TELEPHONE (OUTPATIENT)
Dept: CARDIOLOGY | Facility: CLINIC | Age: 69
End: 2024-11-20
Payer: MEDICARE

## 2024-11-20 NOTE — LETTER
11/20/24       - Kaveh Gann  Fax # - [phone]    Re: Yeison Bryant, 1955   Procedure/Surgery - knee arthroplasty        Dear Dr. Gann,     Yeison Bryant, 1955 may proceed with a scheduled procedure/surgery from a cardiac/EP standpoint.  Any questions please call [phone].    [x]  Continue Aspirin. Prior to surgery patient should hold Eliquis for 72 hours and resume postprocedure. He should not hold his aspirin.            Sincerely,          DORIS Chopra

## 2025-02-07 NOTE — TELEPHONE ENCOUNTER
----- Message from Cheyenne Churchill sent at 11/20/2024 12:47 PM EST -----  Regarding: Call patient and needs clearance  Let him know echo is okay he needs clearance for knee surgery with Dr. Gann  ----- Message -----  From: Vic Peña IV, MD  Sent: 11/19/2024  12:33 PM EST  To: DORIS Caldwell   [Skin Wound] : skin wound [Negative] : Heme/Lymph

## 2025-04-28 ENCOUNTER — TRANSCRIBE ORDERS (OUTPATIENT)
Dept: ADMINISTRATIVE | Facility: HOSPITAL | Age: 70
End: 2025-04-28
Payer: MEDICARE

## 2025-04-28 DIAGNOSIS — R80.9 PROTEINURIA, UNSPECIFIED TYPE: Primary | ICD-10-CM

## 2025-05-05 ENCOUNTER — TELEPHONE (OUTPATIENT)
Dept: CARDIOLOGY | Facility: CLINIC | Age: 70
End: 2025-05-05

## 2025-05-05 DIAGNOSIS — R06.09 DYSPNEA ON EXERTION: ICD-10-CM

## 2025-05-05 DIAGNOSIS — I27.20 PULMONARY HYPERTENSION: Primary | ICD-10-CM

## 2025-05-05 NOTE — TELEPHONE ENCOUNTER
Spoke with the patient. He has had episodes of shortness of breath at rest. He says he can walk 2-3 miles without any issues. He can walk into a store about 50 ft and get shortness of breath. Also occurs at rest. Episodes last about 20-30 seconds. He is mostly concerned about his profuse sweating that happens for no reason. Last episode about 1 week ago.    's. HR ranges from . He doesn't know when he is in afib. He has a [employer] watch but it only gives him the HR, not alerts or rhythm.     He did have labs at PCP. Reports HA1C was 7.2.. He did report that he recently started semaglutide and is on the 0.25 mg weekly dose. He does not check his blood sugar at home. Reports his TSH was normal.     [facility] (1/25/2021):  Moderate to severe pulmonary hypertension that is associated with an elevated left ventricular end-diastolic pressure. The coronary arteries are essentially normal. Abnormal left ventriculogram with severe global hypokinesia of the left ventricle and an estimated ejection fraction of 20%.                          Echo 11/19/2024- LVEF 55%. Bioprosthetic aortic valve present. The valve functions normally.    Did you want any testing? Stress test or monitor?

## 2025-05-05 NOTE — TELEPHONE ENCOUNTER
Caller: Yeison Bryant     Relationship: SELF    Best call back number: [phone]    What is your medical concern? PT HAS ALWAYS BEEN ASYMPTOMATIC WITH HIS AFIB, BUT RECENTLY HE IS HAVING NOTICEABLE SYMPTOMS. PT HAS EPISODES WHERE HE WILL SWEAT PROFUSELY, HE HAS SHORTNESS OF BREATH, HE IS GETTING TIRED MORE OFTEN.     How long has this issue been going on? A FEW MONTHS NOW. HE IS STARTING TO BECOME CONCERNED. HE CAN COME TO [facility] OR [place] FOR A VISIT, BUT HE WOULD LIKE TO TRY TO COME IN SOONER THAN HIS SCHEDULED APPOINTMENT 10.31.25.

## 2025-05-06 RX ORDER — POTASSIUM CHLORIDE 750 MG/1
20 TABLET, EXTENDED RELEASE ORAL DAILY
Qty: 180 TABLET | Refills: 3 | Status: SHIPPED | OUTPATIENT
Start: 2025-05-06 | End: 2025-05-06

## 2025-05-06 RX ORDER — FUROSEMIDE 40 MG/1
40 TABLET ORAL 2 TIMES DAILY
Qty: 180 TABLET | Refills: 3 | Status: SHIPPED | OUTPATIENT
Start: 2025-05-06 | End: 2025-05-06

## 2025-05-06 NOTE — TELEPHONE ENCOUNTER
I would have him follow-up with his PCP.  He probably needs some blood work and chest x-ray.    RODOLFO Peña MD FACC, [facility]  Interventional and General Cardiology

## 2025-05-06 NOTE — TELEPHONE ENCOUNTER
Spoke with the patient. He just saw his PCP last week. I have requested lab results. He is going to  [place] tomorrow for a kidney US. Did you want to order labs for him to have done while there? CXR as well?

## 2025-05-07 ENCOUNTER — HOSPITAL ENCOUNTER (OUTPATIENT)
Facility: HOSPITAL | Age: 70
Discharge: HOME | End: 2025-05-07
Payer: MEDICARE

## 2025-05-07 DIAGNOSIS — R80.9 PROTEINURIA, UNSPECIFIED TYPE: ICD-10-CM

## 2025-05-07 DIAGNOSIS — R06.09 DYSPNEA ON EXERTION: ICD-10-CM

## 2025-05-07 PROCEDURE — 76775 US EXAM ABDO BACK WALL LIM: CPT

## 2025-05-07 PROCEDURE — 76775 US EXAM ABDO BACK WALL LIM: CPT | Performed by: RADIOLOGY

## 2025-05-07 PROCEDURE — 71046 X-RAY EXAM CHEST 2 VIEWS: CPT

## 2025-05-08 PROCEDURE — 71046 X-RAY EXAM CHEST 2 VIEWS: CPT | Performed by: RADIOLOGY

## 2025-05-13 ENCOUNTER — TELEPHONE (OUTPATIENT)
Dept: CARDIOLOGY | Facility: CLINIC | Age: 70
End: 2025-05-13
Payer: MEDICARE

## 2025-05-13 DIAGNOSIS — I10 ESSENTIAL HYPERTENSION: ICD-10-CM

## 2025-05-13 DIAGNOSIS — R06.09 DYSPNEA ON EXERTION: ICD-10-CM

## 2025-05-13 DIAGNOSIS — I50.32 HEART FAILURE WITH IMPROVED EJECTION FRACTION (HFIMPEF): ICD-10-CM

## 2025-05-13 DIAGNOSIS — I48.21 PERMANENT ATRIAL FIBRILLATION: Primary | Chronic | ICD-10-CM

## 2025-05-13 DIAGNOSIS — Z95.3 HISTORY OF AORTIC VALVE REPLACEMENT WITH BIOPROSTHETIC VALVE: ICD-10-CM

## 2025-05-14 NOTE — TELEPHONE ENCOUNTER
Caller: Yeison Bryant    Relationship: Self    Best call back number: [phone]    What is the best time to reach you: ANY    Who are you requesting to speak with (clinical staff, provider,  specific staff member): CLINICAL    Do you know the name of the person who called: N/A    What was the call regarding: PATIENT IS CALLING TO DISCUSS TESTING AND RESULTS AND MEDICATION WITH DR. ROBLERO OR HIS NURSE. PATIENT HAD CHEST XRAY ON 5-7-25. PATIENT HAD CALLED ON 5-5-25 GOT MEDICATIONS DATED FOR THE 5-6-25- BUT HAD TESTING DONE ON 5-7-25. PATIENT WOULD LIKE A CALL TO DISCUSS RESULTS AND MEDICATION. ALSO ABOUT HIS VISIT ON 10-31-25. PATIENT WOULD LIKE TO HAVE THAT APPOINTMENT MOVED TO SOMETIME MID SEPTEMBER.     Is it okay if the provider responds through [employer]: CALL      
Echo and nuclear stress  
Spoke with the patient. He reports he is still having shortness of breath and HR fluctuations. He also reports that he has an upcoming knee replacement with Dr. Gann and will need cardiac clearance. Did you want a stress test?   
no

## 2025-06-23 ENCOUNTER — HOSPITAL ENCOUNTER (OUTPATIENT)
Dept: CARDIOLOGY | Facility: HOSPITAL | Age: 70
Discharge: HOME | End: 2025-06-23
Payer: MEDICARE

## 2025-06-23 VITALS
DIASTOLIC BLOOD PRESSURE: 73 MMHG | BODY MASS INDEX: 36.7 KG/M2 | HEIGHT: 74 IN | WEIGHT: 286 LBS | SYSTOLIC BLOOD PRESSURE: 112 MMHG

## 2025-06-23 DIAGNOSIS — R06.09 DYSPNEA ON EXERTION: ICD-10-CM

## 2025-06-23 DIAGNOSIS — I48.21 PERMANENT ATRIAL FIBRILLATION: Chronic | ICD-10-CM

## 2025-06-23 DIAGNOSIS — Z95.3 HISTORY OF AORTIC VALVE REPLACEMENT WITH BIOPROSTHETIC VALVE: ICD-10-CM

## 2025-06-23 DIAGNOSIS — I10 ESSENTIAL HYPERTENSION: ICD-10-CM

## 2025-06-23 DIAGNOSIS — I50.32 HEART FAILURE WITH IMPROVED EJECTION FRACTION (HFIMPEF): ICD-10-CM

## 2025-06-23 PROBLEM — R94.39 ABNORMAL NUCLEAR STRESS TEST: Status: ACTIVE | Noted: 2025-06-23

## 2025-06-23 LAB
AORTIC DIMENSIONLESS INDEX: 0.41 (DI)
AV MEAN PRESS GRAD SYS DOP V1V2: 6 MMHG
AV VMAX SYS DOP: 180 CM/SEC
BH CV ECHO MEAS - AO MAX PG: 13 MMHG
BH CV ECHO MEAS - AO ROOT DIAM: 4.3 CM
BH CV ECHO MEAS - AO V2 VTI: 27.8 CM
BH CV ECHO MEAS - AVA(I,D): 1.23 CM2
BH CV ECHO MEAS - EDV(CUBED): 85.2 ML
BH CV ECHO MEAS - EDV(MOD-SP2): 224 ML
BH CV ECHO MEAS - EDV(MOD-SP4): 173 ML
BH CV ECHO MEAS - EF(MOD-SP2): 50.9 %
BH CV ECHO MEAS - EF(MOD-SP4): 59.1 %
BH CV ECHO MEAS - ESV(CUBED): 31.5 ML
BH CV ECHO MEAS - ESV(MOD-SP2): 110 ML
BH CV ECHO MEAS - ESV(MOD-SP4): 70.7 ML
BH CV ECHO MEAS - FS: 28.2 %
BH CV ECHO MEAS - IVS/LVPW: 1 CM
BH CV ECHO MEAS - IVSD: 1.3 CM
BH CV ECHO MEAS - LA DIMENSION: 4.4 CM
BH CV ECHO MEAS - LAT PEAK E' VEL: 11.4 CM/SEC
BH CV ECHO MEAS - LV DIASTOLIC VOL/BSA (35-75): 68.4 CM2
BH CV ECHO MEAS - LV MASS(C)D: 215.1 GRAMS
BH CV ECHO MEAS - LV MAX PG: 1.86 MMHG
BH CV ECHO MEAS - LV MEAN PG: 1 MMHG
BH CV ECHO MEAS - LV SYSTOLIC VOL/BSA (12-30): 27.9 CM2
BH CV ECHO MEAS - LV V1 MAX: 67.9 CM/SEC
BH CV ECHO MEAS - LV V1 VTI: 11.3 CM
BH CV ECHO MEAS - LVIDD: 4.4 CM
BH CV ECHO MEAS - LVIDS: 3.2 CM
BH CV ECHO MEAS - LVOT AREA: 3 CM2
BH CV ECHO MEAS - LVOT DIAM: 1.96 CM
BH CV ECHO MEAS - LVPWD: 1.3 CM
BH CV ECHO MEAS - MED PEAK E' VEL: 8.8 CM/SEC
BH CV ECHO MEAS - MV DEC SLOPE: 368.2 CM/SEC2
BH CV ECHO MEAS - MV DEC TIME: 0.22 SEC
BH CV ECHO MEAS - MV E MAX VEL: 69 CM/SEC
BH CV ECHO MEAS - MV MAX PG: 3.2 MMHG
BH CV ECHO MEAS - MV MEAN PG: 1.12 MMHG
BH CV ECHO MEAS - MV P1/2T: 76.3 MSEC
BH CV ECHO MEAS - MV V2 VTI: 21.7 CM
BH CV ECHO MEAS - MVA(P1/2T): 2.9 CM2
BH CV ECHO MEAS - MVA(VTI): 1.58 CM2
BH CV ECHO MEAS - PA ACC TIME: 0.14 SEC
BH CV ECHO MEAS - PA V2 MAX: 89.2 CM/SEC
BH CV ECHO MEAS - RAP SYSTOLE: 3 MMHG
BH CV ECHO MEAS - RVSP: 25 MMHG
BH CV ECHO MEAS - SV(LVOT): 34.3 ML
BH CV ECHO MEAS - SV(MOD-SP2): 114 ML
BH CV ECHO MEAS - SV(MOD-SP4): 102.3 ML
BH CV ECHO MEAS - SVI(LVOT): 13.5 ML/M2
BH CV ECHO MEAS - SVI(MOD-SP2): 45.1 ML/M2
BH CV ECHO MEAS - SVI(MOD-SP4): 40.4 ML/M2
BH CV ECHO MEAS - TR MAX PG: 22.1 MMHG
BH CV ECHO MEAS - TR MAX VEL: 235.3 CM/SEC
BH CV ECHO MEASUREMENTS AVERAGE E/E' RATIO: 6.83
BH CV REST NUCLEAR ISOTOPE DOSE: 9.2 MCI
BH CV STRESS COMMENTS STAGE 1: NORMAL
BH CV STRESS DOSE REGADENOSON STAGE 1: 0.4
BH CV STRESS DURATION MIN STAGE 1: 1
BH CV STRESS DURATION MIN STAGE 2: 1
BH CV STRESS DURATION MIN STAGE 3: 1
BH CV STRESS DURATION MIN STAGE 4: 1
BH CV STRESS DURATION SEC STAGE 1: 0
BH CV STRESS DURATION SEC STAGE 2: 0
BH CV STRESS DURATION SEC STAGE 3: 0
BH CV STRESS DURATION SEC STAGE 4: 0
BH CV STRESS HR STAGE 1: 79
BH CV STRESS HR STAGE 2: 92
BH CV STRESS HR STAGE 3: 80
BH CV STRESS HR STAGE 4: 78
BH CV STRESS NUCLEAR ISOTOPE DOSE: 32.8 MCI
BH CV STRESS O2 STAGE 1: 96
BH CV STRESS O2 STAGE 2: 96
BH CV STRESS O2 STAGE 3: 98
BH CV STRESS O2 STAGE 4: 99
BH CV STRESS PROTOCOL 1: NORMAL
BH CV STRESS RECOVERY BP: NORMAL MMHG
BH CV STRESS RECOVERY HR: 71 BPM
BH CV STRESS RECOVERY O2: 97 %
BH CV STRESS STAGE 1: 1
BH CV STRESS STAGE 2: 2
BH CV STRESS STAGE 3: 3
BH CV STRESS STAGE 4: 4
BH CV VAS BP LEFT ARM: NORMAL MMHG
BH CV XLRA - RV BASE: 4.6 CM
BH CV XLRA - RV LENGTH: 8.4 CM
BH CV XLRA - RV MID: 3.6 CM
BH CV XLRA - TDI S': 8.6 CM/SEC
IVRT: 134 MS
LEFT ATRIUM VOLUME INDEX: 43.9 ML/M2
LV EF 2D ECHO EST: 60 %
LV EF BIPLANE MOD: 55.1 %
MAXIMAL PREDICTED HEART RATE: 152 BPM
PERCENT MAX PREDICTED HR: 60.53 %
SPECT HRT GATED+EF W RNC IV: 55 %
STRESS BASELINE BP: NORMAL MMHG
STRESS BASELINE HR: 60 BPM
STRESS O2 SAT REST: 97 %
STRESS PERCENT HR: 71 %
STRESS POST ESTIMATED WORKLOAD: 1 METS
STRESS POST EXERCISE DUR MIN: 4 MIN
STRESS POST EXERCISE DUR SEC: 0 SEC
STRESS POST O2 SAT PEAK: 99 %
STRESS POST PEAK BP: NORMAL MMHG
STRESS POST PEAK HR: 92 BPM
STRESS TARGET HR: 129 BPM

## 2025-06-23 PROCEDURE — A9500 TC99M SESTAMIBI: HCPCS | Performed by: INTERNAL MEDICINE

## 2025-06-23 PROCEDURE — 93306 TTE W/DOPPLER COMPLETE: CPT

## 2025-06-23 PROCEDURE — 93018 CV STRESS TEST I&R ONLY: CPT | Performed by: INTERNAL MEDICINE

## 2025-06-23 PROCEDURE — 78452 HT MUSCLE IMAGE SPECT MULT: CPT

## 2025-06-23 PROCEDURE — 93306 TTE W/DOPPLER COMPLETE: CPT | Performed by: INTERNAL MEDICINE

## 2025-06-23 PROCEDURE — 25010000002 SULFUR HEXAFLUORIDE MICROSPH 60.7-25 MG RECONSTITUTED SUSPENSION: Performed by: INTERNAL MEDICINE

## 2025-06-23 PROCEDURE — 34310000005 TECHNETIUM SESTAMIBI: Performed by: INTERNAL MEDICINE

## 2025-06-23 PROCEDURE — 78452 HT MUSCLE IMAGE SPECT MULT: CPT | Performed by: INTERNAL MEDICINE

## 2025-06-23 PROCEDURE — 93017 CV STRESS TEST TRACING ONLY: CPT

## 2025-06-23 PROCEDURE — 25010000002 REGADENOSON 0.4 MG/5ML SOLUTION: Performed by: INTERNAL MEDICINE

## 2025-06-23 PROCEDURE — 93016 CV STRESS TEST SUPVJ ONLY: CPT | Performed by: INTERNAL MEDICINE

## 2025-06-23 RX ORDER — SEMAGLUTIDE 1.34 MG/ML
0.5 INJECTION, SOLUTION SUBCUTANEOUS WEEKLY
COMMUNITY

## 2025-06-23 RX ORDER — REGADENOSON 0.08 MG/ML
0.4 INJECTION, SOLUTION INTRAVENOUS ONCE
Status: COMPLETED | OUTPATIENT
Start: 2025-06-23 | End: 2025-06-23

## 2025-06-23 RX ADMIN — REGADENOSON 0.4 MG: 0.08 INJECTION, SOLUTION INTRAVENOUS at 10:07

## 2025-06-23 RX ADMIN — TECHNETIUM TC 99M SESTAMIBI 1 DOSE: 1 INJECTION INTRAVENOUS at 10:07

## 2025-06-23 RX ADMIN — TECHNETIUM TC 99M SESTAMIBI 1 DOSE: 1 INJECTION INTRAVENOUS at 08:33

## 2025-06-23 RX ADMIN — SULFUR HEXAFLUORIDE 2 ML: KIT at 09:14

## 2025-06-30 ENCOUNTER — HOSPITAL ENCOUNTER (OUTPATIENT)
Facility: HOSPITAL | Age: 70
Setting detail: HOSPITAL OUTPATIENT SURGERY
Discharge: HOME | End: 2025-06-30
Attending: INTERNAL MEDICINE | Admitting: INTERNAL MEDICINE
Payer: MEDICARE

## 2025-06-30 ENCOUNTER — PREP FOR SURGERY (OUTPATIENT)
Dept: OTHER | Facility: HOSPITAL | Age: 70
End: 2025-06-30
Payer: MEDICARE

## 2025-06-30 VITALS
OXYGEN SATURATION: 98 % | HEART RATE: 65 BPM | DIASTOLIC BLOOD PRESSURE: 88 MMHG | RESPIRATION RATE: 15 BRPM | TEMPERATURE: 97.4 F | WEIGHT: 267.2 LBS | HEIGHT: 74 IN | BODY MASS INDEX: 34.29 KG/M2 | SYSTOLIC BLOOD PRESSURE: 117 MMHG

## 2025-06-30 DIAGNOSIS — E78.5 HYPERLIPIDEMIA, UNSPECIFIED HYPERLIPIDEMIA TYPE: ICD-10-CM

## 2025-06-30 DIAGNOSIS — I10 ESSENTIAL HYPERTENSION: ICD-10-CM

## 2025-06-30 DIAGNOSIS — I25.10 CORONARY ARTERY DISEASE INVOLVING NATIVE CORONARY ARTERY OF NATIVE HEART WITHOUT ANGINA PECTORIS: Primary | ICD-10-CM

## 2025-06-30 DIAGNOSIS — I50.32 HEART FAILURE WITH IMPROVED EJECTION FRACTION (HFIMPEF): ICD-10-CM

## 2025-06-30 DIAGNOSIS — E78.5 HYPERLIPIDEMIA LDL GOAL <70: ICD-10-CM

## 2025-06-30 DIAGNOSIS — Z95.3 HISTORY OF AORTIC VALVE REPLACEMENT WITH BIOPROSTHETIC VALVE: ICD-10-CM

## 2025-06-30 DIAGNOSIS — R94.39 ABNORMAL NUCLEAR STRESS TEST: ICD-10-CM

## 2025-06-30 DIAGNOSIS — I48.21 PERMANENT ATRIAL FIBRILLATION: Chronic | ICD-10-CM

## 2025-06-30 LAB
ALBUMIN SERPL-MCNC: 4.3 G/DL (ref 3.5–5.2)
ALBUMIN SERPL-MCNC: 4.4 G/DL (ref 3.5–5.2)
ALBUMIN/GLOB SERPL: 2.1 G/DL
ALBUMIN/GLOB SERPL: 2.4 G/DL
ALP SERPL-CCNC: 78 U/L (ref 39–117)
ALP SERPL-CCNC: 79 U/L (ref 39–117)
ALT SERPL W P-5'-P-CCNC: 34 U/L (ref 1–41)
ALT SERPL W P-5'-P-CCNC: 37 U/L (ref 1–41)
ANION GAP SERPL CALCULATED.3IONS-SCNC: 10 MMOL/L (ref 5–15)
ANION GAP SERPL CALCULATED.3IONS-SCNC: 12.2 MMOL/L (ref 5–15)
ARTICHOKE IGE QN: 106 MG/DL (ref 0–100)
AST SERPL-CCNC: 25 U/L (ref 1–40)
AST SERPL-CCNC: 26 U/L (ref 1–40)
BILIRUB SERPL-MCNC: 0.9 MG/DL (ref 0–1.2)
BILIRUB SERPL-MCNC: 0.9 MG/DL (ref 0–1.2)
BUN SERPL-MCNC: 23 MG/DL (ref 8–23)
BUN SERPL-MCNC: 23.8 MG/DL (ref 8–23)
BUN/CREAT SERPL: 23 (ref 7–25)
BUN/CREAT SERPL: 28.3 (ref 7–25)
CALCIUM SPEC-SCNC: 8.6 MG/DL (ref 8.6–10.5)
CALCIUM SPEC-SCNC: 8.9 MG/DL (ref 8.6–10.5)
CHLORIDE SERPL-SCNC: 105 MMOL/L (ref 98–107)
CHLORIDE SERPL-SCNC: 106 MMOL/L (ref 98–107)
CHOLEST SERPL-MCNC: 173 MG/DL (ref 0–200)
CO2 SERPL-SCNC: 21.8 MMOL/L (ref 22–29)
CO2 SERPL-SCNC: 24 MMOL/L (ref 22–29)
CREAT SERPL-MCNC: 0.84 MG/DL (ref 0.76–1.27)
CREAT SERPL-MCNC: 1 MG/DL (ref 0.76–1.27)
DEPRECATED RDW RBC AUTO: 45.2 FL (ref 37–54)
EGFRCR SERPLBLD CKD-EPI 2021: 82 ML/MIN/1.73
EGFRCR SERPLBLD CKD-EPI 2021: 95 ML/MIN/1.73
ERYTHROCYTE [DISTWIDTH] IN BLOOD BY AUTOMATED COUNT: 13 % (ref 12.3–15.4)
GLOBULIN UR ELPH-MCNC: 1.8 GM/DL
GLOBULIN UR ELPH-MCNC: 2.1 GM/DL
GLUCOSE SERPL-MCNC: 119 MG/DL (ref 65–99)
GLUCOSE SERPL-MCNC: 124 MG/DL (ref 65–99)
HCT VFR BLD AUTO: 46.3 % (ref 37.5–51)
HDLC SERPL-MCNC: 55 MG/DL (ref 40–60)
HGB BLD-MCNC: 15.3 G/DL (ref 13–17.7)
LDLC SERPL CALC-MCNC: 102 MG/DL (ref 0–100)
LDLC/HDLC SERPL: 1.83 {RATIO}
MCH RBC QN AUTO: 31.4 PG (ref 26.6–33)
MCHC RBC AUTO-ENTMCNC: 33 G/DL (ref 31.5–35.7)
MCV RBC AUTO: 94.9 FL (ref 79–97)
PLATELET # BLD AUTO: 189 10*3/MM3 (ref 140–450)
PMV BLD AUTO: 9.4 FL (ref 6–12)
POTASSIUM SERPL-SCNC: 4.3 MMOL/L (ref 3.5–5.2)
POTASSIUM SERPL-SCNC: 4.5 MMOL/L (ref 3.5–5.2)
PROT SERPL-MCNC: 6.1 G/DL (ref 6–8.5)
PROT SERPL-MCNC: 6.5 G/DL (ref 6–8.5)
RBC # BLD AUTO: 4.88 10*6/MM3 (ref 4.14–5.8)
SODIUM SERPL-SCNC: 139 MMOL/L (ref 136–145)
SODIUM SERPL-SCNC: 140 MMOL/L (ref 136–145)
TRIGL SERPL-MCNC: 87 MG/DL (ref 0–150)
VLDLC SERPL-MCNC: 16 MG/DL (ref 5–40)
WBC NRBC COR # BLD AUTO: 10.36 10*3/MM3 (ref 3.4–10.8)

## 2025-06-30 PROCEDURE — 25010000002 LIDOCAINE 1 % SOLUTION: Performed by: INTERNAL MEDICINE

## 2025-06-30 PROCEDURE — 80053 COMPREHEN METABOLIC PANEL: CPT | Performed by: INTERNAL MEDICINE

## 2025-06-30 PROCEDURE — C1769 GUIDE WIRE: HCPCS | Performed by: INTERNAL MEDICINE

## 2025-06-30 PROCEDURE — 25010000002 HEPARIN (PORCINE) PER 1000 UNITS: Performed by: INTERNAL MEDICINE

## 2025-06-30 PROCEDURE — 93458 L HRT ARTERY/VENTRICLE ANGIO: CPT | Performed by: INTERNAL MEDICINE

## 2025-06-30 PROCEDURE — 25010000002 NICARDIPINE 2.5 MG/ML SOLUTION: Performed by: INTERNAL MEDICINE

## 2025-06-30 PROCEDURE — 25010000002 MIDAZOLAM PER 1 MG: Performed by: INTERNAL MEDICINE

## 2025-06-30 PROCEDURE — 80061 LIPID PANEL: CPT | Performed by: INTERNAL MEDICINE

## 2025-06-30 PROCEDURE — 25010000002 FENTANYL CITRATE (PF) 50 MCG/ML SOLUTION: Performed by: INTERNAL MEDICINE

## 2025-06-30 PROCEDURE — C1894 INTRO/SHEATH, NON-LASER: HCPCS | Performed by: INTERNAL MEDICINE

## 2025-06-30 PROCEDURE — 25510000001 IOPAMIDOL PER 1 ML: Performed by: INTERNAL MEDICINE

## 2025-06-30 PROCEDURE — S0260 H&P FOR SURGERY: HCPCS | Performed by: INTERNAL MEDICINE

## 2025-06-30 PROCEDURE — 83695 ASSAY OF LIPOPROTEIN(A): CPT | Performed by: INTERNAL MEDICINE

## 2025-06-30 PROCEDURE — 85027 COMPLETE CBC AUTOMATED: CPT | Performed by: INTERNAL MEDICINE

## 2025-06-30 PROCEDURE — 36415 COLL VENOUS BLD VENIPUNCTURE: CPT

## 2025-06-30 PROCEDURE — 83721 ASSAY OF BLOOD LIPOPROTEIN: CPT | Performed by: INTERNAL MEDICINE

## 2025-06-30 RX ORDER — ACETAMINOPHEN 325 MG/1
650 TABLET ORAL EVERY 4 HOURS PRN
Status: CANCELLED | OUTPATIENT
Start: 2025-06-30

## 2025-06-30 RX ORDER — LIDOCAINE HYDROCHLORIDE 10 MG/ML
INJECTION, SOLUTION INFILTRATION; PERINEURAL
Status: DISCONTINUED | OUTPATIENT
Start: 2025-06-30 | End: 2025-06-30 | Stop reason: HOSPADM

## 2025-06-30 RX ORDER — FENTANYL CITRATE 50 UG/ML
INJECTION, SOLUTION INTRAMUSCULAR; INTRAVENOUS
Status: DISCONTINUED | OUTPATIENT
Start: 2025-06-30 | End: 2025-06-30 | Stop reason: HOSPADM

## 2025-06-30 RX ORDER — EZETIMIBE 10 MG/1
10 TABLET ORAL DAILY
Qty: 30 TABLET | Refills: 5 | Status: SHIPPED | OUTPATIENT
Start: 2025-06-30

## 2025-06-30 RX ORDER — MIDAZOLAM HYDROCHLORIDE 1 MG/ML
INJECTION, SOLUTION INTRAMUSCULAR; INTRAVENOUS
Status: DISCONTINUED | OUTPATIENT
Start: 2025-06-30 | End: 2025-06-30 | Stop reason: HOSPADM

## 2025-06-30 RX ORDER — ATORVASTATIN CALCIUM 20 MG/1
20 TABLET, FILM COATED ORAL NIGHTLY
Qty: 30 TABLET | Refills: 5 | Status: SHIPPED | OUTPATIENT
Start: 2025-06-30 | End: 2025-06-30 | Stop reason: HOSPADM

## 2025-06-30 RX ORDER — ASPIRIN 81 MG/1
324 TABLET, CHEWABLE ORAL ONCE
Status: CANCELLED | OUTPATIENT
Start: 2025-06-30 | End: 2025-06-30

## 2025-06-30 RX ORDER — ASPIRIN 81 MG/1
TABLET, CHEWABLE ORAL
Status: DISCONTINUED | OUTPATIENT
Start: 2025-06-30 | End: 2025-06-30 | Stop reason: HOSPADM

## 2025-06-30 RX ORDER — HEPARIN SODIUM 1000 [USP'U]/ML
INJECTION, SOLUTION INTRAVENOUS; SUBCUTANEOUS
Status: DISCONTINUED | OUTPATIENT
Start: 2025-06-30 | End: 2025-06-30 | Stop reason: HOSPADM

## 2025-06-30 RX ORDER — SODIUM CHLORIDE 0.9 % (FLUSH) 0.9 %
10 SYRINGE (ML) INJECTION EVERY 12 HOURS SCHEDULED
Status: CANCELLED | OUTPATIENT
Start: 2025-06-30

## 2025-06-30 RX ORDER — IOPAMIDOL 755 MG/ML
INJECTION, SOLUTION INTRAVASCULAR
Status: DISCONTINUED | OUTPATIENT
Start: 2025-06-30 | End: 2025-06-30 | Stop reason: HOSPADM

## 2025-06-30 RX ORDER — SODIUM CHLORIDE 0.9 % (FLUSH) 0.9 %
10 SYRINGE (ML) INJECTION AS NEEDED
Status: CANCELLED | OUTPATIENT
Start: 2025-06-30

## 2025-06-30 RX ORDER — NITROGLYCERIN 0.4 MG/1
0.4 TABLET SUBLINGUAL
Status: CANCELLED | OUTPATIENT
Start: 2025-06-30

## 2025-06-30 RX ORDER — ASPIRIN 81 MG/1
81 TABLET ORAL DAILY
Status: CANCELLED | OUTPATIENT
Start: 2025-07-01

## 2025-06-30 RX ORDER — SODIUM CHLORIDE 9 MG/ML
40 INJECTION, SOLUTION INTRAVENOUS AS NEEDED
Status: CANCELLED | OUTPATIENT
Start: 2025-06-30

## 2025-06-30 NOTE — H&P
Yeison Bryant  [id number]  1955   LOS: 0 days   Patient Care Team:  Kreis, Samuel Duane, MD as PCP - General (Family Medicine)  Vic Peña IV, MD as Consulting Physician (Interventional Cardiology)  Cheyenne Churchill APRN as Nurse Practitioner (Interventional Cardiology)  Larry Gann MD as Surgeon (Orthopedic Surgery)    Chief Complaint: Abnormal stress test    Allergies   Allergen Reactions    Bee Venom Swelling     Medications Prior to Admission   Medication Sig Dispense Refill Last Dose/Taking    acetaminophen (TYLENOL) 325 MG tablet Take 2 tablets by mouth Every 6 (Six) Hours As Needed for Mild Pain . 30 tablet 0     apixaban (ELIQUIS) 5 MG tablet tablet Take 1 tablet by mouth Every 12 (Twelve) Hours. Indications: Atrial Fibrillation 180 tablet 3     aspirin 81 MG EC tablet Take 1 tablet by mouth Daily.       carvedilol (COREG) 12.5 MG tablet Take 1 tablet by mouth 2 (Two) Times a Day With Meals. 180 tablet 3     multivitamin with minerals tablet tablet Take 1 tablet by mouth Daily.       sacubitril-valsartan (Entresto) 49-51 MG tablet Take 1 tablet by mouth 2 (Two) Times a Day. 180 tablet 3     Semaglutide,0.25 or 0.5MG/DOS, (Ozempic, 0.25 or 0.5 MG/DOSE,) 2 MG/1.5ML solution pen-injector Inject 0.5 mg under the skin into the appropriate area as directed 1 (One) Time Per Week.        Scheduled Meds:  Continuous Infusions:No current facility-administered medications for this encounter.    PRN Meds:.       History of Present Illness:      Yeison Bryant is a 69-year-old male with past medical history listed below who presents today for an elective left heart cath secondary to abnormal stress test.  Patient requesting cardiac clearance for total knee replacement last month which a updated echocardiogram and stress test were performed.  Echo showed improved ejection fraction from prior however stress test showed anterior and inferior lateral deficits consistent with prior infarct  versus attenuation.  Patient was agreeable to move forward with a [facility].    Problem List:  Problem List Items Addressed This Visit          Cardiac and Vasculature    Essential hypertension    Overview   Target blood pressure <130/80 mmHg         Relevant Orders    LDL Cholesterol, Direct    Comprehensive Metabolic Panel    CBC (No Diff)    HLD (hyperlipidemia)    Relevant Orders    LDL Cholesterol, Direct    Comprehensive Metabolic Panel    CBC (No Diff)    History of aortic valve replacement with bioprosthetic valve    Overview   Previous 5.6cm ascending aortic aneurysm with bicuspid aortic valve stenosis  [place] with bioprosthetic tissue valve (#27 trifecta) by Thai Guerrier, 3/3/2021  Echo (10/12/2022): LVEF 52%.  Normal functioning bioprosthetic aortic valve.  Ascending aorta 4.5 cm  Echo (11/18/2024): LVEF 55%.  Mild LVH.  Dilated left atrium.  Bioprosthetic aortic valve that functions normally.  RVSP 35 mmHg         Relevant Orders    LDL Cholesterol, Direct    Comprehensive Metabolic Panel    CBC (No Diff)    * (Principal) Abnormal nuclear stress test    Relevant Orders    Cardiac Catheterization/Vascular Study    LDL Cholesterol, Direct    Comprehensive Metabolic Panel    CBC (No Diff)       Other    Heart failure with improved ejection fraction (HFimpEF)    Overview   Echo (6/15/2021): LVEF 42%.  Echo (10/12/2022): LVEF 52%.  Normal functioning bioprosthetic aortic valve.  Ascending aorta 4.5 cm         Relevant Orders    LDL Cholesterol, Direct    Comprehensive Metabolic Panel    CBC (No Diff)       Cardiac risk factors: advanced age (older than 55 for men, 65 for women), dyslipidemia, family history of premature cardiovascular disease, hypertension, male gender, and smoking/ tobacco exposure.    Past Surgical History:   Procedure Laterality Date    AORTIC VALVE REPAIR/REPLACEMENT N/A 03/03/2021    Procedure: MEDIAN STERNOTOMY, MAZE, AORTIC VALVE REPLACEMENT WITH LEFT FEMORAL ARTERIAL CANNULATION;  Surgeon:  Evgeny Guerrier MD;  Location:  [facility];  Service: Cardiothoracic;  Laterality: N/A;    AORTIC VALVE SURGERY      CARDIAC CATHETERIZATION Left 2021    Procedure: Left Heart Cath - COVID+ ;  Surgeon: Mahendra Waldron MD;  Location:  [facility];  Service: Cardiology;  Laterality: Left;    CARDIAC CATHETERIZATION N/A 2021    Procedure: Aortic root aortogram;  Surgeon: Mahendra Waldron MD;  Location:  [facility];  Service: Cardiology;  Laterality: N/A;    CARDIAC CATHETERIZATION  2021    CATARACT EXTRACTION      COLONOSCOPY      EYE SURGERY      lasic and cataract    FEMORAL ARTERY CUTDOWN Left 2021    Procedure: drainage of left seroma and capsulectomy;  Surgeon: Evgeny Guerrier MD;  Location:  [facility];  Service: Vascular;  Laterality: Left;    KNEE SURGERY Bilateral     THORACIC AORTIC ANEURYSM REPAIR N/A 2021    Procedure: ASCENDING THORACIC AORTIC ANEURYSM REPAIR, DIONISIO PER ANESTHESIA;  Surgeon: Evgeny Guerrier MD;  Location:  [facility];  Service: Cardiothoracic;  Laterality: N/A;    TONSILLECTOMY      TRIGGER FINGER RELEASE       Social History     Socioeconomic History    Marital status:     Number of children: 2   Tobacco Use    Smoking status: Former     Current packs/day: 0.00     Average packs/day: 2.0 packs/day for 36.0 years (72.0 ttl pk-yrs)     Types: Cigarettes     Start date: 1972     Quit date: 2008     Years since quittin.4    Smokeless tobacco: Never   Vaping Use    Vaping status: Never Used   Substance and Sexual Activity    Alcohol use: Yes     Alcohol/week: 4.0 standard drinks of alcohol     Types: 3 Glasses of wine, 1 Shots of liquor per week     Comment: OCCASIONAL ALCOHOL     Drug use: Never    Sexual activity: Yes     Partners: Female     Birth control/protection: None     Family History   Adopted: Yes   Family history unknown: Yes       Review of Systems  10 point review of systems was  "completed, positives outlined in the HPI, and otherwise all other systems are negative.      Objective:       Physical Exam  /92 (BP Location: Left arm, Patient Position: Lying)   Pulse 70   Temp 97.4 °F (36.3 °C) (Temporal)   Resp 18   Ht 188 cm (74\")   Wt 121 kg (267 lb 3.2 oz)   SpO2 96%   BMI 34.31 kg/m²       06/30/25  0712   Weight: 121 kg (267 lb 3.2 oz)     Body mass index is 34.31 kg/m².  No intake or output data in the 24 hours ending 06/30/25 0724    General Appearance:  Alert, cooperative, no distress, appears stated age   Head:  Normocephalic, without obvious abnormality, atraumatic   Eyes:  PERRL, conjunctivae/corneas clear, EOM's intact, fundi benign, both eyes   Throat: Lips, mucosa, and tongue normal; teeth and gums normal   Neck: Supple, symmetrical, trachea midline, no adenopathy, thyroid: not enlarged, symmetric, no tenderness/mass/nodules, no carotid bruit or JVD   Lungs:   Clear to auscultation bilaterally, respirations unlabored   Heart:  Regular rate and rhythm, S1, S2 normal, no murmur, rub or gallop   Abdomen:   Soft, non-tender, no masses, no organomegaly, bowel sounds audible x4   Extremities: No edema, normal range of motion   Pulses: 2+ and symmetric   Skin: Skin color, texture, turgor normal, no rashes or lesions   Neurologic: Normal        Results for orders placed during the hospital encounter of 06/23/25    Adult Transthoracic Echo Complete W/ Cont if Necessary Per Protocol    Interpretation Summary    Left ventricular systolic function is normal. Estimated left ventricular EF = 60%    Left ventricular wall thickness is consistent with concentric hypertrophy.    The left atrial cavity is dilated.    There is a 27 mm bioprosthetic aortic valve present.  The valve functions normally.    Estimated right ventricular systolic pressure from tricuspid regurgitation is normal (<35 mmHg).       Lab Review   Lab Results   Component Value Date    GLUCOSE 133 (H) 10/29/2024    " CALCIUM 9.4 10/29/2024     10/29/2024    K 4.4 10/29/2024    CO2 26.4 10/29/2024     10/29/2024    BUN 18 10/29/2024    CREATININE 0.93 10/29/2024    EGFR 89.4 10/29/2024    BCR 19.4 10/29/2024    ANIONGAP 7.6 10/29/2024     Lab Results   Component Value Date    WBC 14.4 (H) 12/04/2024    HGB 12.1 (L) 12/04/2024    HCT 36.4 (L) 12/04/2024    MCV 95 12/04/2024     (L) 12/04/2024     Lab Results   Component Value Date    HGBA1C 6.2 (H) 12/04/2024     Lab Results   Component Value Date    TSH 1.940 07/09/2021     Lab Results   Component Value Date    CHOL 175 10/29/2024    TRIG 140 10/29/2024    HDL 47 10/29/2024     (H) 10/29/2024           Assessment      HFpEF  PAF   - Abnormal Stress Test    - Stable PAF and HF, EF improved to 60% from 55% in 2024      Plan:      LHC +/- CBI via right radial artery.      The risks, benefits, and alternatives of the procedure have been reviewed and the patient wishes to proceed.       DORIS Hayden

## 2025-06-30 NOTE — SIGNIFICANT NOTE
All discharge instructions discussed with patient and wife. Denies any questions at this time. Verbalized understanding

## 2025-07-01 LAB — LPA SERPL-SCNC: NORMAL NMOL/L

## (undated) DEVICE — GLV SURG BIOGEL LTX PF 7 1/2

## (undated) DEVICE — SUT PROLN 3/0 8832H

## (undated) DEVICE — Device

## (undated) DEVICE — PENCL ROCKRSWCH MEGADYNE W/HOLSTR 10FT SS

## (undated) DEVICE — SPNG GZ WOVN 4X4IN 12PLY 10/BX STRL

## (undated) DEVICE — MODEL AT P65, P/N 701554-001KIT CONTENTS: HAND CONTROLLER, 3-WAY HIGH-PRESSURE STOPCOCK WITH ROTATING END AND PREMIUM HIGH-PRESSURE TUBING: Brand: ANGIOTOUCH® KIT

## (undated) DEVICE — AVID DUAL STAGE VENOUS DRAINAGE CANNULA: Brand: AVID DUAL STAGE VENOUS DRAINAGE CANNULA

## (undated) DEVICE — GLIDESHEATH BASIC HYDROPHILIC COATED INTRODUCER SHEATH: Brand: GLIDESHEATH

## (undated) DEVICE — GLV SURG DERMASSURE GRN LF PF 7.0

## (undated) DEVICE — JACKSON-PRATT 100CC BULB RESERVOIR: Brand: CARDINAL HEALTH

## (undated) DEVICE — ANTIBACTERIAL UNDYED BRAIDED (POLYGLACTIN 910), SYNTHETIC ABSORBABLE SUTURE: Brand: COATED VICRYL

## (undated) DEVICE — CLMP ABLAT ISOL SYNERGY SHRT CRV LT

## (undated) DEVICE — FLTR RESERV PERFUS INTERSEPT 02 STRL

## (undated) DEVICE — ORGANIZER SUT GABBAY FRATER GFS10A PK/3

## (undated) DEVICE — CATH DIAG EXPO .056 AL2 6F 100CM

## (undated) DEVICE — CAUTRY ACCUTEMP HI TEMP FINE TIP 2IN

## (undated) DEVICE — 3M™ IOBAN™ 2 ANTIMICROBIAL INCISE DRAPE 6650EZ: Brand: IOBAN™ 2

## (undated) DEVICE — PATIENT RETURN ELECTRODE, SINGLE-USE, CONTACT QUALITY MONITORING, ADULT, WITH 9FT CORD, FOR PATIENTS WEIGING OVER 33LBS. (15KG): Brand: MEGADYNE

## (undated) DEVICE — AVANTI + 4F STD W/GW: Brand: AVANTI

## (undated) DEVICE — TR BAND RADIAL ARTERY COMPRESSION DEVICE: Brand: TR BAND

## (undated) DEVICE — SUT PROLN 4/0 V5 36IN 8935H

## (undated) DEVICE — LEVEL SENSORS PADS ARE USED TO ATTACH THE LEVEL SENSORS TO A HARD SHELL RESERVOIR. INCLUDES COUPLING GEL.: Brand: TERUMO® ADVANCED PERFUSION SYSTEM 1

## (undated) DEVICE — SUT PDS 1 CTX 36IN VIO PDP371T

## (undated) DEVICE — ADULT, W/LG. BACK PAD, RADIOTRANSPARENT ELEMENT AND LEAD WIRE COMPATIBLE W/: Brand: DEFIBRILLATION ELECTRODES

## (undated) DEVICE — SUT SILK 0/0 CT2 18IN C027D

## (undated) DEVICE — ANGIO-SEAL VIP VASCULAR CLOSURE DEVICE: Brand: ANGIO-SEAL

## (undated) DEVICE — 12 FOOT DISPOSABLE EXTENSION CABLE WITH SAFE CONNECT / SCREW-DOWN

## (undated) DEVICE — INTRO SHEATH ART/FEM ENGAGE .038 6F12CM

## (undated) DEVICE — PAD ARMBRD SURG CONVOL 7.5X20X2IN

## (undated) DEVICE — ADHS SKIN PREMIERPRO EXOFIN TOPICAL HI/VISC .5ML

## (undated) DEVICE — GW PERIPH GUIDERIGHT STD/EXCHNG/J/TIP SS 0.035IN 5X260CM

## (undated) DEVICE — TBG SXN INTRACARD RIDGID FLUT 24F .25X13IN A/

## (undated) DEVICE — 32 FR STRAIGHT – SOFT PVC CATHETER: Brand: PVC THORACIC CATHETERS

## (undated) DEVICE — PK PERFUS CUST W/CARDIOPLEGIA

## (undated) DEVICE — INCISIONLINE PLEDGET SFT 22X1 2.75MM

## (undated) DEVICE — SUT PROLN 5/0 C1 D/A 36IN 8720H

## (undated) DEVICE — CATH DIAG EXPO .056 FL5 6F 100CM

## (undated) DEVICE — SUT PROLN 5/0 V5 36IN 8934H

## (undated) DEVICE — SAFESECURE,SECUREMENT,FOLEY CATH,STERILE: Brand: MEDLINE

## (undated) DEVICE — GLV SURG DERMASSURE GRN LF PF SZ 6.5

## (undated) DEVICE — OASIS DRAIN, SINGLE, INLINE & ATS COMPATIBLE: Brand: OASIS

## (undated) DEVICE — SUT ETHIBOND 2/0 CV V5  30IN PXX52

## (undated) DEVICE — BLD SCLPL BEAVR MINI STR 2BVL 180D LF

## (undated) DEVICE — CATH DIAG EXPO M/ PK 5F FL4/FR4 PIG

## (undated) DEVICE — SWAN-GANZ THERMODILUTION CATHETER: Brand: SWAN-GANZ

## (undated) DEVICE — GLV SURG BIOGEL LTX PF 8

## (undated) DEVICE — GLV SURG PREMIERPRO MIC LTX PF SZ6.5 BRN

## (undated) DEVICE — PK VASC 10

## (undated) DEVICE — GLV SURG SENSICARE PI MIC PF SZ6.5 LF STRL

## (undated) DEVICE — CATH DIAG EXPO .056 AL1 6F 100CM

## (undated) DEVICE — SUT PROLN 4/0 RB1 D/A 36IN 8557H

## (undated) DEVICE — CATHETER,URETHRAL,REDRUBBER,STERILE,22FR: Brand: MEDLINE

## (undated) DEVICE — PK HEART OPN 10

## (undated) DEVICE — GLV SURG SENSICARE PI MIC PF SZ7.5 LF STRL

## (undated) DEVICE — ST EXT IV SMRTSTE 2VLV FIX M LL 6ML 41

## (undated) DEVICE — MODEL BT2000 P/N 700287-012KIT CONTENTS: MANIFOLD WITH SALINE AND CONTRAST PORTS, SALINE TUBING WITH SPIKE AND HAND SYRINGE, TRANSDUCER: Brand: BT2000 AUTOMATED MANIFOLD KIT

## (undated) DEVICE — GLV SURG PREMIERPRO MIC LTX PF SZ7 BRN

## (undated) DEVICE — DECANT BG O JET

## (undated) DEVICE — JP 19FR SILICONE DRAIN: Brand: CARDINAL HEALTH

## (undated) DEVICE — PK ATS CUST W CARDIOTOMY RESEVOIR

## (undated) DEVICE — TOWEL,OR,DSP,ST,BLUE,STD,4/PK,20PK/CS: Brand: MEDLINE

## (undated) DEVICE — 3M™ TEGADERM™ CHG DRESSING 25/CARTON 4 CARTONS/CASE 1658: Brand: TEGADERM™

## (undated) DEVICE — TUBING, SUCTION, 1/4" X 10', STRAIGHT: Brand: MEDLINE

## (undated) DEVICE — CATH DIAG EXPO M/ PK 6FR FL4/FR4 PIG 3PK

## (undated) DEVICE — SUT PROLN 6/0 C1 D/A 30IN 8706H

## (undated) DEVICE — NDL PERC 1PRT THNWALL W/BASEPLT 18G 7CM

## (undated) DEVICE — GW CERBRL FC PTFE STD/STR .035 260CM

## (undated) DEVICE — 3M™ MEDIPORE™ H SOFT CLOTH SURGICAL TAPE, 2863, 3 IN X 10 YD, 12/CASE: Brand: 3M™ MEDIPORE™

## (undated) DEVICE — SUT PROLN 7/0 BV1 D/A 24IN 8702H

## (undated) DEVICE — SUMP INTRA/CARD 12F 4MM

## (undated) DEVICE — SUT SILK 2 SUTUPAK TIE 60IN SA8H 2STRAND

## (undated) DEVICE — SYR LUERLOK 30CC

## (undated) DEVICE — CONNECT Y INTERSEPT W/LL 3/8 X 3/8 X 3/8IN

## (undated) DEVICE — KT MANIFOLD CATHLAB CUST

## (undated) DEVICE — IRRIGATOR BULB ASEPTO 60CC STRL

## (undated) DEVICE — 32 FR RIGHT ANGLE – SOFT PVC CATHETER: Brand: PVC THORACIC CATHETERS

## (undated) DEVICE — CVR PROB ULTRASND/TRANSD W/GEL 7X11IN STRL

## (undated) DEVICE — PINNACLE INTRODUCER SHEATH: Brand: PINNACLE

## (undated) DEVICE — NDL ANGIOGR ADV THN SMOTH SGLWALL 21G 1.5

## (undated) DEVICE — SUT PROLN 4/0 SH D/A 36IN 8521H

## (undated) DEVICE — SUT MONOCRYL PLS ANTIB UND 3/0  PS1 27IN

## (undated) DEVICE — TP UMB COTN 30IN U11T

## (undated) DEVICE — SUCTION CANISTER, 2500CC, RIGID: Brand: DEROYAL

## (undated) DEVICE — CATH DIAG IMPULSE FL3.5 6F 100CM

## (undated) DEVICE — DRSNG SURESITE WNDW 4X4.5

## (undated) DEVICE — SYS CLS SKIN PREMIERPRO EXOFINFUSION 22CM

## (undated) DEVICE — TTL1LYR 16FR10ML 100%SIL TMPST TR: Brand: MEDLINE

## (undated) DEVICE — CLTH CLENS READYCLEANSE PERI CARE PK/5

## (undated) DEVICE — SKIN PREP TRAY W/CHG: Brand: MEDLINE INDUSTRIES, INC.

## (undated) DEVICE — CATH DIAG EXPO .056 FR5 6F 100CM

## (undated) DEVICE — CANN CORNRY DLP OSTIAL SIL 15F10IN

## (undated) DEVICE — DRSNG SURG AQUACEL AG 9X15CM

## (undated) DEVICE — EZ GLIDE AORTIC CANNULA: Brand: EDWARDS LIFESCIENCES EZ GLIDE AORTIC CANNULA

## (undated) DEVICE — CANN AORT ROOT DLP VNT 14G 7F

## (undated) DEVICE — PK CATH CARD 10

## (undated) DEVICE — CO-SET DELIVERY SYSTEM FOR 123 ROOM TEMPATURE INJECTATE: Brand: CO-SET+

## (undated) DEVICE — SENSR CERBRL O2 PK/2

## (undated) DEVICE — ADAPT/Y PERFUS DLP FML/LUER COLR/CODE/CLMP 8.9AND25.4CM

## (undated) DEVICE — SUT PROLN 4/0 V7 36IN 8975H

## (undated) DEVICE — GLV SURG PREMIERPRO MIC LTX PF SZ7.5 BRN